# Patient Record
Sex: FEMALE | Race: OTHER | NOT HISPANIC OR LATINO
[De-identification: names, ages, dates, MRNs, and addresses within clinical notes are randomized per-mention and may not be internally consistent; named-entity substitution may affect disease eponyms.]

---

## 2017-01-18 ENCOUNTER — APPOINTMENT (OUTPATIENT)
Dept: GYNECOLOGIC ONCOLOGY | Facility: CLINIC | Age: 72
End: 2017-01-18

## 2017-01-18 VITALS
HEART RATE: 60 BPM | DIASTOLIC BLOOD PRESSURE: 70 MMHG | WEIGHT: 200 LBS | SYSTOLIC BLOOD PRESSURE: 170 MMHG | BODY MASS INDEX: 36.58 KG/M2

## 2017-01-19 LAB
ALBUMIN SERPL ELPH-MCNC: 4.4 G/DL
ALP BLD-CCNC: 112 U/L
ALT SERPL-CCNC: 20 U/L
ANION GAP SERPL CALC-SCNC: 16 MMOL/L
AST SERPL-CCNC: 16 U/L
BILIRUB SERPL-MCNC: 0.4 MG/DL
BUN SERPL-MCNC: 20 MG/DL
CALCIUM SERPL-MCNC: 10 MG/DL
CANCER AG125 SERPL-ACNC: 28 U/ML
CEA SERPL-MCNC: 6 NG/ML
CHLORIDE SERPL-SCNC: 106 MMOL/L
CO2 SERPL-SCNC: 21 MMOL/L
CREAT SERPL-MCNC: 0.85 MG/DL
GLUCOSE SERPL-MCNC: 150 MG/DL
POTASSIUM SERPL-SCNC: 4.6 MMOL/L
PROT SERPL-MCNC: 6.8 G/DL
SODIUM SERPL-SCNC: 143 MMOL/L

## 2017-02-03 ENCOUNTER — OUTPATIENT (OUTPATIENT)
Dept: OUTPATIENT SERVICES | Facility: HOSPITAL | Age: 72
LOS: 1 days | End: 2017-02-03
Payer: MEDICARE

## 2017-02-03 PROCEDURE — 78815 PET IMAGE W/CT SKULL-THIGH: CPT

## 2017-02-03 PROCEDURE — A9552: CPT

## 2017-02-03 PROCEDURE — 78815 PET IMAGE W/CT SKULL-THIGH: CPT | Mod: 26,PI

## 2017-03-22 ENCOUNTER — APPOINTMENT (OUTPATIENT)
Dept: OPHTHALMOLOGY | Facility: CLINIC | Age: 72
End: 2017-03-22

## 2017-04-04 ENCOUNTER — APPOINTMENT (OUTPATIENT)
Dept: OPHTHALMOLOGY | Facility: CLINIC | Age: 72
End: 2017-04-04

## 2017-04-19 ENCOUNTER — APPOINTMENT (OUTPATIENT)
Dept: GYNECOLOGIC ONCOLOGY | Facility: CLINIC | Age: 72
End: 2017-04-19

## 2017-04-19 VITALS
BODY MASS INDEX: 36.44 KG/M2 | DIASTOLIC BLOOD PRESSURE: 84 MMHG | SYSTOLIC BLOOD PRESSURE: 164 MMHG | WEIGHT: 198 LBS | HEART RATE: 61 BPM | HEIGHT: 62 IN

## 2017-04-20 LAB
CANCER AG125 SERPL-ACNC: 30 U/ML
CEA SERPL-MCNC: 5.8 NG/ML
CREAT SERPL-MCNC: 0.91 MG/DL

## 2017-08-14 ENCOUNTER — APPOINTMENT (OUTPATIENT)
Dept: GYNECOLOGIC ONCOLOGY | Facility: CLINIC | Age: 72
End: 2017-08-14
Payer: MEDICARE

## 2017-08-14 VITALS
HEIGHT: 62 IN | BODY MASS INDEX: 36.44 KG/M2 | DIASTOLIC BLOOD PRESSURE: 82 MMHG | HEART RATE: 62 BPM | SYSTOLIC BLOOD PRESSURE: 146 MMHG | WEIGHT: 198 LBS

## 2017-08-14 PROCEDURE — 36415 COLL VENOUS BLD VENIPUNCTURE: CPT

## 2017-08-14 PROCEDURE — 99214 OFFICE O/P EST MOD 30 MIN: CPT

## 2017-08-15 ENCOUNTER — INPATIENT (INPATIENT)
Facility: HOSPITAL | Age: 72
LOS: 1 days | Discharge: HOME | End: 2017-08-17
Attending: HOSPITALIST | Admitting: HOSPITALIST
Payer: MEDICARE

## 2017-08-15 VITALS
DIASTOLIC BLOOD PRESSURE: 76 MMHG | RESPIRATION RATE: 18 BRPM | HEART RATE: 78 BPM | OXYGEN SATURATION: 98 % | TEMPERATURE: 98 F | SYSTOLIC BLOOD PRESSURE: 123 MMHG

## 2017-08-15 DIAGNOSIS — L03.90 CELLULITIS, UNSPECIFIED: ICD-10-CM

## 2017-08-15 DIAGNOSIS — E03.9 HYPOTHYROIDISM, UNSPECIFIED: ICD-10-CM

## 2017-08-15 DIAGNOSIS — Z29.9 ENCOUNTER FOR PROPHYLACTIC MEASURES, UNSPECIFIED: ICD-10-CM

## 2017-08-15 DIAGNOSIS — E11.9 TYPE 2 DIABETES MELLITUS WITHOUT COMPLICATIONS: ICD-10-CM

## 2017-08-15 DIAGNOSIS — I10 ESSENTIAL (PRIMARY) HYPERTENSION: ICD-10-CM

## 2017-08-15 LAB
ALBUMIN SERPL ELPH-MCNC: 4.3 G/DL — SIGNIFICANT CHANGE UP (ref 3.3–5)
ALP SERPL-CCNC: 94 U/L — SIGNIFICANT CHANGE UP (ref 40–120)
ALT FLD-CCNC: 19 U/L — SIGNIFICANT CHANGE UP (ref 4–33)
AST SERPL-CCNC: 19 U/L — SIGNIFICANT CHANGE UP (ref 4–32)
BASE EXCESS BLDV CALC-SCNC: 0 MMOL/L — SIGNIFICANT CHANGE UP
BASOPHILS # BLD AUTO: 0.04 K/UL — SIGNIFICANT CHANGE UP (ref 0–0.2)
BASOPHILS NFR BLD AUTO: 0.5 % — SIGNIFICANT CHANGE UP (ref 0–2)
BASOPHILS NFR SPEC: 0.9 % — SIGNIFICANT CHANGE UP (ref 0–2)
BILIRUB SERPL-MCNC: 0.6 MG/DL — SIGNIFICANT CHANGE UP (ref 0.2–1.2)
BLOOD GAS VENOUS - CREATININE: 0.99 MG/DL — SIGNIFICANT CHANGE UP (ref 0.5–1.3)
BUN SERPL-MCNC: 28 MG/DL — HIGH (ref 7–23)
CALCIUM SERPL-MCNC: 9.7 MG/DL — SIGNIFICANT CHANGE UP (ref 8.4–10.5)
CHLORIDE BLDV-SCNC: 106 MMOL/L — SIGNIFICANT CHANGE UP (ref 96–108)
CHLORIDE SERPL-SCNC: 102 MMOL/L — SIGNIFICANT CHANGE UP (ref 98–107)
CO2 SERPL-SCNC: 25 MMOL/L — SIGNIFICANT CHANGE UP (ref 22–31)
CREAT SERPL-MCNC: 0.97 MG/DL — SIGNIFICANT CHANGE UP (ref 0.5–1.3)
EOSINOPHIL # BLD AUTO: 0.15 K/UL — SIGNIFICANT CHANGE UP (ref 0–0.5)
EOSINOPHIL NFR BLD AUTO: 2.1 % — SIGNIFICANT CHANGE UP (ref 0–6)
EOSINOPHIL NFR FLD: 5.2 % — SIGNIFICANT CHANGE UP (ref 0–6)
ERYTHROCYTE [SEDIMENTATION RATE] IN BLOOD: 44 MM/HR — HIGH (ref 4–25)
GAS PNL BLDV: 139 MMOL/L — SIGNIFICANT CHANGE UP (ref 136–146)
GIANT PLATELETS BLD QL SMEAR: PRESENT — SIGNIFICANT CHANGE UP
GLUCOSE BLDV-MCNC: 129 — HIGH (ref 70–99)
GLUCOSE SERPL-MCNC: 138 MG/DL — HIGH (ref 70–99)
HCO3 BLDV-SCNC: 23 MMOL/L — SIGNIFICANT CHANGE UP (ref 20–27)
HCT VFR BLD CALC: 34 % — LOW (ref 34.5–45)
HCT VFR BLDV CALC: 37.5 % — SIGNIFICANT CHANGE UP (ref 34.5–45)
HGB BLD-MCNC: 11.5 G/DL — SIGNIFICANT CHANGE UP (ref 11.5–15.5)
HGB BLDV-MCNC: 12.2 G/DL — SIGNIFICANT CHANGE UP (ref 11.5–15.5)
HYPOCHROMIA BLD QL: SLIGHT — SIGNIFICANT CHANGE UP
IMM GRANULOCYTES # BLD AUTO: 0.02 # — SIGNIFICANT CHANGE UP
IMM GRANULOCYTES NFR BLD AUTO: 0.3 % — SIGNIFICANT CHANGE UP (ref 0–1.5)
LACTATE BLDV-MCNC: 1.6 MMOL/L — SIGNIFICANT CHANGE UP (ref 0.5–2)
LYMPHOCYTES # BLD AUTO: 1.28 K/UL — SIGNIFICANT CHANGE UP (ref 1–3.3)
LYMPHOCYTES # BLD AUTO: 17.6 % — SIGNIFICANT CHANGE UP (ref 13–44)
LYMPHOCYTES NFR SPEC AUTO: 14.8 % — SIGNIFICANT CHANGE UP (ref 13–44)
MCHC RBC-ENTMCNC: 31.9 PG — SIGNIFICANT CHANGE UP (ref 27–34)
MCHC RBC-ENTMCNC: 33.8 % — SIGNIFICANT CHANGE UP (ref 32–36)
MCV RBC AUTO: 94.4 FL — SIGNIFICANT CHANGE UP (ref 80–100)
MONOCYTES # BLD AUTO: 0.57 K/UL — SIGNIFICANT CHANGE UP (ref 0–0.9)
MONOCYTES NFR BLD AUTO: 7.8 % — SIGNIFICANT CHANGE UP (ref 2–14)
MONOCYTES NFR BLD: 7.8 % — SIGNIFICANT CHANGE UP (ref 2–9)
NEUTROPHIL AB SER-ACNC: 71.3 % — SIGNIFICANT CHANGE UP (ref 43–77)
NEUTROPHILS # BLD AUTO: 5.23 K/UL — SIGNIFICANT CHANGE UP (ref 1.8–7.4)
NEUTROPHILS NFR BLD AUTO: 71.7 % — SIGNIFICANT CHANGE UP (ref 43–77)
NRBC # FLD: 0 — SIGNIFICANT CHANGE UP
PCO2 BLDV: 52 MMHG — HIGH (ref 41–51)
PH BLDV: 7.31 PH — LOW (ref 7.32–7.43)
PLATELET # BLD AUTO: 197 K/UL — SIGNIFICANT CHANGE UP (ref 150–400)
PLATELET COUNT - ESTIMATE: NORMAL — SIGNIFICANT CHANGE UP
PMV BLD: 10.7 FL — SIGNIFICANT CHANGE UP (ref 7–13)
PO2 BLDV: 26 MMHG — LOW (ref 35–40)
POTASSIUM BLDV-SCNC: 3.6 MMOL/L — SIGNIFICANT CHANGE UP (ref 3.4–4.5)
POTASSIUM SERPL-MCNC: 3.8 MMOL/L — SIGNIFICANT CHANGE UP (ref 3.5–5.3)
POTASSIUM SERPL-SCNC: 3.8 MMOL/L — SIGNIFICANT CHANGE UP (ref 3.5–5.3)
PROT SERPL-MCNC: 7 G/DL — SIGNIFICANT CHANGE UP (ref 6–8.3)
RBC # BLD: 3.6 M/UL — LOW (ref 3.8–5.2)
RBC # FLD: 12.5 % — SIGNIFICANT CHANGE UP (ref 10.3–14.5)
SAO2 % BLDV: 38.7 % — LOW (ref 60–85)
SODIUM SERPL-SCNC: 141 MMOL/L — SIGNIFICANT CHANGE UP (ref 135–145)
WBC # BLD: 7.29 K/UL — SIGNIFICANT CHANGE UP (ref 3.8–10.5)
WBC # FLD AUTO: 7.29 K/UL — SIGNIFICANT CHANGE UP (ref 3.8–10.5)

## 2017-08-15 PROCEDURE — 73620 X-RAY EXAM OF FOOT: CPT | Mod: 26,RT

## 2017-08-15 PROCEDURE — 99223 1ST HOSP IP/OBS HIGH 75: CPT | Mod: GC

## 2017-08-15 RX ORDER — SODIUM CHLORIDE 9 MG/ML
1000 INJECTION, SOLUTION INTRAVENOUS
Qty: 0 | Refills: 0 | Status: DISCONTINUED | OUTPATIENT
Start: 2017-08-15 | End: 2017-08-16

## 2017-08-15 RX ORDER — ACETAMINOPHEN 500 MG
650 TABLET ORAL EVERY 6 HOURS
Qty: 0 | Refills: 0 | Status: DISCONTINUED | OUTPATIENT
Start: 2017-08-15 | End: 2017-08-17

## 2017-08-15 RX ORDER — DEXTROSE 50 % IN WATER 50 %
1 SYRINGE (ML) INTRAVENOUS ONCE
Qty: 0 | Refills: 0 | Status: DISCONTINUED | OUTPATIENT
Start: 2017-08-15 | End: 2017-08-17

## 2017-08-15 RX ORDER — VANCOMYCIN HCL 1 G
1000 VIAL (EA) INTRAVENOUS ONCE
Qty: 0 | Refills: 0 | Status: COMPLETED | OUTPATIENT
Start: 2017-08-15 | End: 2017-08-15

## 2017-08-15 RX ORDER — LEVOTHYROXINE SODIUM 125 MCG
50 TABLET ORAL DAILY
Qty: 0 | Refills: 0 | Status: DISCONTINUED | OUTPATIENT
Start: 2017-08-15 | End: 2017-08-17

## 2017-08-15 RX ORDER — ENOXAPARIN SODIUM 100 MG/ML
40 INJECTION SUBCUTANEOUS EVERY 24 HOURS
Qty: 0 | Refills: 0 | Status: DISCONTINUED | OUTPATIENT
Start: 2017-08-15 | End: 2017-08-17

## 2017-08-15 RX ORDER — VANCOMYCIN HCL 1 G
1000 VIAL (EA) INTRAVENOUS EVERY 12 HOURS
Qty: 0 | Refills: 0 | Status: DISCONTINUED | OUTPATIENT
Start: 2017-08-15 | End: 2017-08-17

## 2017-08-15 RX ORDER — GLUCAGON INJECTION, SOLUTION 0.5 MG/.1ML
1 INJECTION, SOLUTION SUBCUTANEOUS ONCE
Qty: 0 | Refills: 0 | Status: DISCONTINUED | OUTPATIENT
Start: 2017-08-15 | End: 2017-08-17

## 2017-08-15 RX ORDER — PIPERACILLIN AND TAZOBACTAM 4; .5 G/20ML; G/20ML
3.38 INJECTION, POWDER, LYOPHILIZED, FOR SOLUTION INTRAVENOUS ONCE
Qty: 0 | Refills: 0 | Status: COMPLETED | OUTPATIENT
Start: 2017-08-15 | End: 2017-08-15

## 2017-08-15 RX ORDER — INSULIN LISPRO 100/ML
VIAL (ML) SUBCUTANEOUS AT BEDTIME
Qty: 0 | Refills: 0 | Status: DISCONTINUED | OUTPATIENT
Start: 2017-08-15 | End: 2017-08-17

## 2017-08-15 RX ORDER — INSULIN LISPRO 100/ML
VIAL (ML) SUBCUTANEOUS
Qty: 0 | Refills: 0 | Status: DISCONTINUED | OUTPATIENT
Start: 2017-08-15 | End: 2017-08-17

## 2017-08-15 RX ORDER — DEXTROSE 50 % IN WATER 50 %
12.5 SYRINGE (ML) INTRAVENOUS ONCE
Qty: 0 | Refills: 0 | Status: DISCONTINUED | OUTPATIENT
Start: 2017-08-15 | End: 2017-08-17

## 2017-08-15 RX ORDER — SODIUM CHLORIDE 9 MG/ML
1000 INJECTION, SOLUTION INTRAVENOUS
Qty: 0 | Refills: 0 | Status: DISCONTINUED | OUTPATIENT
Start: 2017-08-15 | End: 2017-08-15

## 2017-08-15 RX ORDER — ONDANSETRON 8 MG/1
4 TABLET, FILM COATED ORAL EVERY 6 HOURS
Qty: 0 | Refills: 0 | Status: DISCONTINUED | OUTPATIENT
Start: 2017-08-15 | End: 2017-08-17

## 2017-08-15 RX ORDER — HYDROCHLOROTHIAZIDE 25 MG
12.5 TABLET ORAL DAILY
Qty: 0 | Refills: 0 | Status: DISCONTINUED | OUTPATIENT
Start: 2017-08-15 | End: 2017-08-17

## 2017-08-15 RX ORDER — LOSARTAN POTASSIUM 100 MG/1
100 TABLET, FILM COATED ORAL DAILY
Qty: 0 | Refills: 0 | Status: DISCONTINUED | OUTPATIENT
Start: 2017-08-15 | End: 2017-08-17

## 2017-08-15 RX ORDER — PIPERACILLIN AND TAZOBACTAM 4; .5 G/20ML; G/20ML
3.38 INJECTION, POWDER, LYOPHILIZED, FOR SOLUTION INTRAVENOUS EVERY 8 HOURS
Qty: 0 | Refills: 0 | Status: DISCONTINUED | OUTPATIENT
Start: 2017-08-15 | End: 2017-08-17

## 2017-08-15 RX ADMIN — Medication 12.5 MILLIGRAM(S): at 18:10

## 2017-08-15 RX ADMIN — Medication 250 MILLIGRAM(S): at 11:58

## 2017-08-15 RX ADMIN — SODIUM CHLORIDE 100 MILLILITER(S): 9 INJECTION, SOLUTION INTRAVENOUS at 18:10

## 2017-08-15 RX ADMIN — PIPERACILLIN AND TAZOBACTAM 25 GRAM(S): 4; .5 INJECTION, POWDER, LYOPHILIZED, FOR SOLUTION INTRAVENOUS at 22:35

## 2017-08-15 RX ADMIN — PIPERACILLIN AND TAZOBACTAM 200 GRAM(S): 4; .5 INJECTION, POWDER, LYOPHILIZED, FOR SOLUTION INTRAVENOUS at 14:43

## 2017-08-15 NOTE — ED PROVIDER NOTE - CARDIAC, MLM
Normal rate, regular rhythm.  Heart sounds S1, S2.  No murmurs, rubs or gallops. +1 pitting edema on left extremity. +2 pitting edema on R extremity. +2 DP pulses b/l.

## 2017-08-15 NOTE — ED PROVIDER NOTE - MEDICAL DECISION MAKING DETAILS
72 y/o F with hx of T2DM, HTN, presents with R foot wound, with sorrounding erythema. Pt has diabetic wound with likely superimposed cellulitis. Currently not septic but given pt is diabetic, with spread of infection, will start vanco for MRSA coverage, and zosyn for strep and GNR coverage. Pt need admission to medicine and podiatry consult. Will obtain CBC, blood cx.

## 2017-08-15 NOTE — H&P ADULT - NSHPPHYSICALEXAM_GEN_ALL_CORE
GENERAL APPEARANCE: Well developed, well nourished, alert and cooperative. NAD.   HEENT:  PERRL, EOMI  NECK: Neck supple, non-tender  CARDIAC: Normal S1 and S2. Rhythm is regular.  LUNGS: Clear to auscultation. No rales, rhonchi or wheezing appreciated.  ABDOMEN: Positive bowel sounds. Soft, nondistended, nontender. No guarding or rebound. +Incisional abdominal hernia x2, both reducible and nontender.   EXTREMITIES: RLE: 2+ edema, erythema, tenderness to palpation. Palpable PT and DP. LLE: 1+ edema, non-tender, non-erythematous. Palpable PT and DP.   NEUROLOGICAL: CN II-XII intact. Strength and sensation symmetric and grossly intact throughout. GENERAL APPEARANCE: Well developed, well nourished, alert and cooperative. NAD.   HEENT:  PERRL, EOMI  NECK: Neck supple, non-tender  CARDIAC: Normal S1 and S2. Rhythm is regular and rate regular  LUNGS: Clear to auscultation. No rales, rhonchi or wheezing appreciated.  ABDOMEN: Positive bowel sounds. Soft, nondistended, nontender. No guarding or rebound. +Incisional abdominal hernia x2, both reducible and nontender.   EXTREMITIES: RLE: 2+ edema, erythema, tenderness to palpation. Palpable PT and DP. LLE: 1+ edema, non-tender, non-erythematous. Palpable PT and DP.   NEUROLOGICAL: CN II-XII intact. Strength and sensation symmetric and grossly intact throughout.  PSYCH: Calm, cooperative, normal affect  SKIN: No other lesions, rash noted except as above on RLE  HEME: No LAD appeciated

## 2017-08-15 NOTE — CONSULT NOTE ADULT - SUBJECTIVE AND OBJECTIVE BOX
Patient is a 72y old  Female who presents with a chief complaint of Right foot cellulitis (15 Aug 2017 17:57)    HPI:  This is a 72 year old female with a PMHx significant for HTN, T2DM and hypothyroidism who presents with a 3 day history of worsening R foot swelling and pain. Patient states that she noticed the swelling 3 days ago, but the pain didn't begin until yesterday. The pain prompted her to take a look at the bottom of her foot with a mirror and notice a ~1cm defect in the plantar foot pad. Patient does not recall stepping on anything sharp and denies any trauma to the area. Patient denies any fevers, chills or nightsweats. Admits to decreased appetite and decreased oral intake. Patient also complains of some nausea, denies abdominal pain or vomiting. Denies CP or SOB. Patient states that she normally ambulates independently.    In ED, VS: 98F, 123/52, 63, 100%RA(18). Vanc/Zosyn x1 dose. (15 Aug 2017 17:57)    PAST MEDICAL & SURGICAL HISTORY:  Type 2 diabetes mellitus: type 2  Hypothyroidism: Adult hypothyroidism  Essential hypertension: HTN (hypertension)  Arthropathy: Arthritis  Abdominal swelling: Pelvic mass  Hyperlipemia  Obesity  Hypothyroidism  DM (Diabetes Mellitus)  HTN  Elective surgery for purposes other than treating health conditions: Gall Bladder removal  Other postprocedural status: S/P appendectomy  Elective surgery for purposes other than treating health conditions: Uterine Biospy  S/P Appendectomy    MEDICATIONS  (STANDING):  losartan 100 milliGRAM(s) Oral daily  levothyroxine 50 MICROGram(s) Oral daily  hydrochlorothiazide 12.5 milliGRAM(s) Oral daily  enoxaparin Injectable 40 milliGRAM(s) SubCutaneous every 24 hours  lactated ringers. 1000 milliLiter(s) (100 mL/Hr) IV Continuous <Continuous>  insulin lispro (HumaLOG) corrective regimen sliding scale   SubCutaneous three times a day before meals  insulin lispro (HumaLOG) corrective regimen sliding scale   SubCutaneous at bedtime  dextrose 50% Injectable 12.5 Gram(s) IV Push once  vancomycin  IVPB 1000 milliGRAM(s) IV Intermittent every 12 hours  piperacillin/tazobactam IVPB. 3.375 Gram(s) IV Intermittent every 8 hours    MEDICATIONS  (PRN):  acetaminophen   Tablet. 650 milliGRAM(s) Oral every 6 hours PRN Moderate Pain (4 - 6)  ondansetron Injectable 4 milliGRAM(s) IV Push every 6 hours PRN Nausea  dextrose Gel 1 Dose(s) Oral once PRN Blood Glucose LESS THAN 70 milliGRAM(s)/deciliter  glucagon  Injectable 1 milliGRAM(s) IntraMuscular once PRN Glucose LESS THAN 70 milligrams/deciliter    Allergies    No Known Allergies    Intolerances    VITALS:    Vital Signs Last 24 Hrs  T(C): 37.3 (15 Aug 2017 21:41), Max: 37.3 (15 Aug 2017 21:41)  T(F): 99.1 (15 Aug 2017 21:41), Max: 99.1 (15 Aug 2017 21:41)  HR: 62 (15 Aug 2017 21:41) (62 - 78)  BP: 130/67 (15 Aug 2017 21:41) (123/52 - 143/68)  BP(mean): --  RR: 18 (15 Aug 2017 21:41) (18 - 18)  SpO2: 100% (15 Aug 2017 21:41) (98% - 100%)    LABS:                    11.5   7.29  )-----------( 197      ( 15 Aug 2017 11:20 )             34.0     08-15    141  |  102  |  28<H>  ----------------------------<  138<H>  3.8   |  25  |  0.97    Ca    9.7      15 Aug 2017 11:20    TPro  7.0  /  Alb  4.3  /  TBili  0.6  /  DBili  x   /  AST  19  /  ALT  19  /  AlkPhos  94  08-15    CAPILLARY BLOOD GLUCOSE  134 (15 Aug 2017 21:41)  98 (15 Aug 2017 17:57)    LOWER EXTREMITY PHYSICAL EXAM:    Vasular: DP/PT 2/4, B/L, CFT <3 seconds to all digits B/L, Temperature gradient WNL, B/L.   Neuro: Epicritic sensation diminished to the level of the ankle, B/L.  Musculoskeletal/Ortho: No gross pedal deformities noted, B/L.  Skin:  Wound #1:  Location:  Sub 2nd met head R foot  Size: Aprox 1.5 cm x 0.5 cm  Depth: To subQ, neg probe to bone  Wound bed: fibrogranular  Drainage: mild serous  Odor: none  Periwound: Erythema and edema to the ankle  Etiology: Likely puncture wound w/ diabetic neuropathy    RADIOLOGY & ADDITIONAL STUDIES:  < from: Xray Foot AP + Lateral, Right (08.15.17 @ 13:21) >  EXAM:  RAD FOOT 2 VIEWS RIGHT      PROCEDURE DATE:  Aug 15 2017     INTERPRETATION:  CLINICAL INDICATION: right foot pain/infection; evaluate   for osteomyelitis    EXAM:  Frontal, oblique, and lateral right foot from 8/15/2017 at 1321. Compared   to prior study from 5/20/2009.    IMPRESSION:  Generalized soft tissue swelling. No tracking soft tissue gas   collections, radiopaque foreign bodies, or gross radiographic evidence   for osteomyelitis.    Chronic tiny ossific dictation adjacentto 5th metatarsal base   tuberosity. No dislocations or acute appearing fractures.     Tarsometatarsal alignment maintained without evidence for a Lisfranc   injury.    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 Preserved remaining   visualized joint spaces.    Plantar and posterior calcaneal dorsomedial talar enthesophytes.    No lytic or blastic lesions.    PITO FISCHER M.D., ATTENDING RADIOLOGIST  This document has been electronically signed. Aug 15 2017  3:12PM    < end of copied text >

## 2017-08-15 NOTE — ED PROVIDER NOTE - PSH
Elective surgery for purposes other than treating health conditions  Uterine Biospy  Elective surgery for purposes other than treating health conditions  Gall Bladder removal  Other postprocedural status  S/P appendectomy  S/P Appendectomy

## 2017-08-15 NOTE — CONSULT NOTE ADULT - ASSESSMENT
73 y/o F w/ R foot sub 2nd met head ulcer to subQ w/ surrounding cellulitis  - Pt seen and examined  - Wound flushed, Cx taken  - Due to neg probe to bone, low concern for OM  - No pod surgical plan at this time. If appearance worsens, consider MRI  - Cont w/ IV Vanco/Zosyn until cx return  - WBAT in surgical shoe to R foot with weight to heel  - Wound packed and covered w/ DSD  - Will d/w attending 73 y/o F w/ R foot sub 2nd met head ulcer to subQ w/ surrounding cellulitis  - Pt seen and examined  - Wound flushed, Cx taken  - Due to neg probe to bone and no bone changes on XR, low concern for OM  - No pod surgical plan at this time. If appearance worsens, consider MRI  - Cont w/ IV Vanco/Zosyn until cx return  - WBAT in surgical shoe to R foot with weight to heel  - Wound packed and covered w/ DSD  - Will d/w attending

## 2017-08-15 NOTE — H&P ADULT - PROBLEM SELECTOR PLAN 2
Holding home medications (glimepride 2mg po daily, jentadueto 25/1000mg po BID)  - Check fingerstick glucose and provide ISS coverage for now, will monitor and adjust as needed.  - Check HgbA1c with AM labs  - Consistent Carbohydrate Diet as tolerated

## 2017-08-15 NOTE — H&P ADULT - ASSESSMENT
72F with PMHx HTN, T2DM and Hypothyroidism admitted with RLE cellulitis 72F with PMHx HTN, T2DM and Hypothyroidism admitted with RLE cellulitis 2/2 diabetic wound.

## 2017-08-15 NOTE — H&P ADULT - NSHPREVIEWOFSYSTEMS_GEN_ALL_CORE
Review of Systems:   CONSTITUTIONAL: No fever, weight loss, or fatigue  RESPIRATORY: No cough, wheezing, chills or hemoptysis; No shortness of breath  CARDIOVASCULAR: No chest pain, palpitations, dizziness, +leg swelling  GASTROINTESTINAL: No abdominal or epigastric pain. No nausea, vomiting, diarrhea, constipation  GENITOURINARY: No dysuria, frequency,   SKIN: No itching, burning, rashes, or lesions, +wound

## 2017-08-15 NOTE — ED ADULT NURSE NOTE - OBJECTIVE STATEMENT
Pt to bed 29. Alert and oriented x 3. Pt c/o right foot ulcer, pain abd swelling. IVL placed. Bloods drawn. Safety maintained. Will continue to monitor.

## 2017-08-15 NOTE — ED PROVIDER NOTE - ATTENDING CONTRIBUTION TO CARE
72F h/o DM, HTN presents for right foot ulcer, rubor, calor, dolor. Open wound. Concern for significant foot infection in diabetic. No signs of sepsis or osteomyelitis. IV antibiotics, IVF, pain control, admit.

## 2017-08-15 NOTE — H&P ADULT - NSHPLABSRESULTS_GEN_ALL_CORE
11.5   7.29  )-----------( 197      ( 15 Aug 2017 11:20 )             34.0     Auto Eosinophil # 0.15  / Auto Eosinophil % 2.1   / Auto Neutrophil # 5.23  / Auto Neutrophil % 71.7  / BANDS % x        08-15    141  |  102  |  28<H>  ----------------------------<  138<H>  3.8   |  25  |  0.97    Ca    9.7      15 Aug 2017 11:20  TPro  7.0  /  Alb  4.3  /  TBili  0.6  /  DBili  x   /  AST  19  /  ALT  19  /  AlkPhos  94  08-15      < from: Xray Foot AP + Lateral, Right (08.15.17 @ 13:21) >  IMPRESSION:  Generalized soft tissue swelling. No tracking soft tissue gas   collections, radiopaque foreign bodies, or gross radiographic evidence   for osteomyelitis.  Chronic tiny ossific dictation adjacentto 5th metatarsal base   tuberosity. No dislocations or acute appearing fractures.   Tarsometatarsal alignment maintained without evidence for a Lisfranc   injury.  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 Preserved remaining   visualized joint spaces.  Plantar and posterior calcaneal dorsomedial talar enthesophytes.  No lytic or blastic lesions.  < end of copied text > LABS AND IMAGING PERSONALLY REVIEWED:                 11.5   7.29  )-----------( 197      ( 15 Aug 2017 11:20 )             34.0     Auto Eosinophil # 0.15  / Auto Eosinophil % 2.1   / Auto Neutrophil # 5.23  / Auto Neutrophil % 71.7  / BANDS % x        08-15    141  |  102  |  28<H>  ----------------------------<  138<H>  3.8   |  25  |  0.97    Ca    9.7      15 Aug 2017 11:20  TPro  7.0  /  Alb  4.3  /  TBili  0.6  /  DBili  x   /  AST  19  /  ALT  19  /  AlkPhos  94  08-15      < from: Xray Foot AP + Lateral, Right (08.15.17 @ 13:21) >  IMPRESSION:  Generalized soft tissue swelling. No tracking soft tissue gas   collections, radiopaque foreign bodies, or gross radiographic evidence   for osteomyelitis.  Chronic tiny ossific dictation adjacentto 5th metatarsal base   tuberosity. No dislocations or acute appearing fractures.   Tarsometatarsal alignment maintained without evidence for a Lisfranc   injury.  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 Preserved remaining   visualized joint spaces.  Plantar and posterior calcaneal dorsomedial talar enthesophytes.  No lytic or blastic lesions.

## 2017-08-15 NOTE — H&P ADULT - PROBLEM SELECTOR PLAN 1
Likely secondary to penetrating trauma by some sort of jagged object (rock, nail, glass) given notable skin defect on plantar foot pad of R foot. Likely delayed presentation secondary to diabetic neuropathy, as patient initially noticed the swelling 3 days ago but didn't begin to feel any discomfort until the day prior to presentation. Does not meet SIRS criteria on admission. Received Vanc/Zosyn in ED.  - Continue Vanc/Zosyn for now for MRSA, Pseudomonal and GNR coverage.   - Await Blood cultures and narrow antibiotic coverage as able.  - Area marked, continue to monitor for signs of response to antibiotic therapy.  - Consult podiatry for further evaluation and recommendations.   - Pain control as needed with Tylenol

## 2017-08-15 NOTE — ED PROVIDER NOTE - SKIN WOUND TYPE
PUNCTURE WOUND(S)/deep wound, no puss expressed. small amount of serosanguinous fluid discharged., pt had surrounding erythema extending to dorsal aspect of foot. swelling and warmth.

## 2017-08-15 NOTE — H&P ADULT - ATTENDING COMMENTS
I personally saw and evaluated the patient at bedside. This is a 72 year old F with DM2 (likely c/b neuropathy), HTN, hypothyroidism admitted for right foot cellulitis likely from stepping on an object. Podiatry consulted for further evaluation of wound, will c/w vancomycin and Zosyn for now for broad spectrum coverage in light of patient's DM2. Check A1c and FLP in AM. ESR not markedly elevated to suggest osteomyelitis but will check CRP. Remaining care as above. F/u blood culture, send 2nd culture, f/u wound culture.    TIME SPENT: 75mins

## 2017-08-15 NOTE — ED PROVIDER NOTE - PMH
Abdominal swelling  Pelvic mass  Arthropathy  Arthritis  DM (Diabetes Mellitus)    Essential hypertension  HTN (hypertension)  HTN    Hyperlipemia    Hypothyroidism  Adult hypothyroidism  Hypothyroidism    Obesity    Type 2 diabetes mellitus  type 2

## 2017-08-15 NOTE — H&P ADULT - NSHPSOCIALHISTORY_GEN_ALL_CORE
Lives at home with  and family. Performs all ADLs independently.   Former Smoker, quit 30-40 years ago.  Social alcohol use.  Denies drug use.

## 2017-08-15 NOTE — H&P ADULT - HISTORY OF PRESENT ILLNESS
This is a 72 year old female with a PMHx significant for HTN, T2DM and hypothyroidism who presents with a 3 day history of worsening R foot swelling and pain. Patient states that she noticed the swelling 3 days ago, but the pain didn't begin until yesterday. The pain prompted her to take a look at the bottom of her foot with a mirror and notice a ~1cm defect in the plantar foot pad. Patient does not recall stepping on anything sharp and denies any trauma to the area. Patient denies any fevers, chills or nightsweats. Admits to decreased appetite and decreased oral intake. Patient also complains of some nausea, denies abdominal pain or vomiting. Denies CP or SOB. Patient states that she normally ambulates independently.    In ED, VS: 98F, 123/52, 63, 100%RA(18). Vanc/Zosyn x1 dose.

## 2017-08-16 ENCOUNTER — TRANSCRIPTION ENCOUNTER (OUTPATIENT)
Age: 72
End: 2017-08-16

## 2017-08-16 LAB
ALBUMIN SERPL ELPH-MCNC: 4.5 G/DL
ALP BLD-CCNC: 100 U/L
ALT SERPL-CCNC: 19 U/L
ANION GAP SERPL CALC-SCNC: 18 MMOL/L
AST SERPL-CCNC: 20 U/L
BASOPHILS # BLD AUTO: 0.02 K/UL
BASOPHILS # BLD AUTO: 0.03 K/UL — SIGNIFICANT CHANGE UP (ref 0–0.2)
BASOPHILS NFR BLD AUTO: 0.2 %
BASOPHILS NFR BLD AUTO: 0.4 % — SIGNIFICANT CHANGE UP (ref 0–2)
BILIRUB SERPL-MCNC: 0.8 MG/DL
BUN SERPL-MCNC: 24 MG/DL — HIGH (ref 7–23)
BUN SERPL-MCNC: 27 MG/DL
CALCIUM SERPL-MCNC: 9.5 MG/DL — SIGNIFICANT CHANGE UP (ref 8.4–10.5)
CALCIUM SERPL-MCNC: 9.6 MG/DL
CANCER AG125 SERPL-ACNC: 23 U/ML
CEA SERPL-MCNC: 4.9 NG/ML
CHLORIDE SERPL-SCNC: 105 MMOL/L
CHLORIDE SERPL-SCNC: 106 MMOL/L — SIGNIFICANT CHANGE UP (ref 98–107)
CHOLEST SERPL-MCNC: 145 MG/DL — SIGNIFICANT CHANGE UP (ref 120–199)
CO2 SERPL-SCNC: 21 MMOL/L — LOW (ref 22–31)
CO2 SERPL-SCNC: 22 MMOL/L
CREAT SERPL-MCNC: 0.95 MG/DL
CREAT SERPL-MCNC: 0.96 MG/DL — SIGNIFICANT CHANGE UP (ref 0.5–1.3)
CRP SERPL-MCNC: 46.4 MG/L — HIGH (ref 0.3–5)
EOSINOPHIL # BLD AUTO: 0.2 K/UL
EOSINOPHIL # BLD AUTO: 0.27 K/UL — SIGNIFICANT CHANGE UP (ref 0–0.5)
EOSINOPHIL NFR BLD AUTO: 2.4 %
EOSINOPHIL NFR BLD AUTO: 3.7 % — SIGNIFICANT CHANGE UP (ref 0–6)
GLUCOSE SERPL-MCNC: 106 MG/DL — HIGH (ref 70–99)
HBA1C BLD-MCNC: 6.2 % — HIGH (ref 4–5.6)
HCT VFR BLD CALC: 33 % — LOW (ref 34.5–45)
HCT VFR BLD CALC: 37.2 %
HDLC SERPL-MCNC: 45 MG/DL — SIGNIFICANT CHANGE UP (ref 45–65)
HGB BLD-MCNC: 11.2 G/DL — LOW (ref 11.5–15.5)
HGB BLD-MCNC: 12 G/DL
IMM GRANULOCYTES # BLD AUTO: 0.02 # — SIGNIFICANT CHANGE UP
IMM GRANULOCYTES NFR BLD AUTO: 0.3 % — SIGNIFICANT CHANGE UP (ref 0–1.5)
IMM GRANULOCYTES NFR BLD AUTO: 0.4 %
LIPID PNL WITH DIRECT LDL SERPL: 93 MG/DL — SIGNIFICANT CHANGE UP
LYMPHOCYTES # BLD AUTO: 1.24 K/UL
LYMPHOCYTES # BLD AUTO: 1.39 K/UL — SIGNIFICANT CHANGE UP (ref 1–3.3)
LYMPHOCYTES # BLD AUTO: 19.1 % — SIGNIFICANT CHANGE UP (ref 13–44)
LYMPHOCYTES NFR BLD AUTO: 15.2 %
MAGNESIUM SERPL-MCNC: 1.7 MG/DL — SIGNIFICANT CHANGE UP (ref 1.6–2.6)
MAN DIFF?: NORMAL
MCHC RBC-ENTMCNC: 31.7 PG
MCHC RBC-ENTMCNC: 31.7 PG — SIGNIFICANT CHANGE UP (ref 27–34)
MCHC RBC-ENTMCNC: 32.3 GM/DL
MCHC RBC-ENTMCNC: 33.9 % — SIGNIFICANT CHANGE UP (ref 32–36)
MCV RBC AUTO: 93.5 FL — SIGNIFICANT CHANGE UP (ref 80–100)
MCV RBC AUTO: 98.4 FL
MONOCYTES # BLD AUTO: 0.61 K/UL — SIGNIFICANT CHANGE UP (ref 0–0.9)
MONOCYTES # BLD AUTO: 0.74 K/UL
MONOCYTES NFR BLD AUTO: 8.4 % — SIGNIFICANT CHANGE UP (ref 2–14)
MONOCYTES NFR BLD AUTO: 9.1 %
NEUTROPHILS # BLD AUTO: 4.97 K/UL — SIGNIFICANT CHANGE UP (ref 1.8–7.4)
NEUTROPHILS # BLD AUTO: 5.94 K/UL
NEUTROPHILS NFR BLD AUTO: 68.1 % — SIGNIFICANT CHANGE UP (ref 43–77)
NEUTROPHILS NFR BLD AUTO: 72.7 %
NRBC # FLD: 0 — SIGNIFICANT CHANGE UP
PHOSPHATE SERPL-MCNC: 3.4 MG/DL — SIGNIFICANT CHANGE UP (ref 2.5–4.5)
PLATELET # BLD AUTO: 166 K/UL — SIGNIFICANT CHANGE UP (ref 150–400)
PLATELET # BLD AUTO: 230 K/UL
PMV BLD: 11.9 FL — SIGNIFICANT CHANGE UP (ref 7–13)
POTASSIUM SERPL-MCNC: 4.6 MMOL/L — SIGNIFICANT CHANGE UP (ref 3.5–5.3)
POTASSIUM SERPL-SCNC: 4.4 MMOL/L
POTASSIUM SERPL-SCNC: 4.6 MMOL/L — SIGNIFICANT CHANGE UP (ref 3.5–5.3)
PROT SERPL-MCNC: 7.5 G/DL
RBC # BLD: 3.53 M/UL — LOW (ref 3.8–5.2)
RBC # BLD: 3.78 M/UL
RBC # FLD: 12.8 % — SIGNIFICANT CHANGE UP (ref 10.3–14.5)
RBC # FLD: 14.2 %
SODIUM SERPL-SCNC: 143 MMOL/L — SIGNIFICANT CHANGE UP (ref 135–145)
SODIUM SERPL-SCNC: 145 MMOL/L
SPECIMEN SOURCE: SIGNIFICANT CHANGE UP
SPECIMEN SOURCE: SIGNIFICANT CHANGE UP
TRIGL SERPL-MCNC: 104 MG/DL — SIGNIFICANT CHANGE UP (ref 10–149)
WBC # BLD: 7.29 K/UL — SIGNIFICANT CHANGE UP (ref 3.8–10.5)
WBC # FLD AUTO: 7.29 K/UL — SIGNIFICANT CHANGE UP (ref 3.8–10.5)
WBC # FLD AUTO: 8.17 K/UL

## 2017-08-16 PROCEDURE — 99233 SBSQ HOSP IP/OBS HIGH 50: CPT | Mod: GC

## 2017-08-16 RX ORDER — TETANUS AND DIPHTHERIA TOXOIDS ADSORBED 2; 2 [LF]/.5ML; [LF]/.5ML
0.5 INJECTION INTRAMUSCULAR ONCE
Qty: 0 | Refills: 0 | Status: COMPLETED | OUTPATIENT
Start: 2017-08-16 | End: 2017-08-17

## 2017-08-16 RX ADMIN — Medication 50 MICROGRAM(S): at 06:23

## 2017-08-16 RX ADMIN — ENOXAPARIN SODIUM 40 MILLIGRAM(S): 100 INJECTION SUBCUTANEOUS at 06:23

## 2017-08-16 RX ADMIN — PIPERACILLIN AND TAZOBACTAM 25 GRAM(S): 4; .5 INJECTION, POWDER, LYOPHILIZED, FOR SOLUTION INTRAVENOUS at 06:23

## 2017-08-16 RX ADMIN — PIPERACILLIN AND TAZOBACTAM 25 GRAM(S): 4; .5 INJECTION, POWDER, LYOPHILIZED, FOR SOLUTION INTRAVENOUS at 21:28

## 2017-08-16 RX ADMIN — Medication 12.5 MILLIGRAM(S): at 06:24

## 2017-08-16 RX ADMIN — PIPERACILLIN AND TAZOBACTAM 25 GRAM(S): 4; .5 INJECTION, POWDER, LYOPHILIZED, FOR SOLUTION INTRAVENOUS at 14:21

## 2017-08-16 RX ADMIN — Medication 650 MILLIGRAM(S): at 02:01

## 2017-08-16 RX ADMIN — Medication 250 MILLIGRAM(S): at 06:23

## 2017-08-16 RX ADMIN — Medication 1: at 08:50

## 2017-08-16 RX ADMIN — Medication 650 MILLIGRAM(S): at 03:00

## 2017-08-16 RX ADMIN — Medication 250 MILLIGRAM(S): at 18:09

## 2017-08-16 RX ADMIN — LOSARTAN POTASSIUM 100 MILLIGRAM(S): 100 TABLET, FILM COATED ORAL at 06:23

## 2017-08-16 NOTE — DISCHARGE NOTE ADULT - PATIENT PORTAL LINK FT
“You can access the Follow[employer] Patient Portal, offered by [facility], by registering with the following website: [email or web address]”

## 2017-08-16 NOTE — PROGRESS NOTE ADULT - SUBJECTIVE AND OBJECTIVE BOX
pt seen at bedside in NAD. dressing to right foot  plantar foot puncture wound some cloudy drainage noted  although erythema and edema down  the skin is taut just proximal to wound  recommend MRI Stat to look for abscess   applied packing with DSD  continue abx   will follow up

## 2017-08-16 NOTE — PROGRESS NOTE ADULT - SUBJECTIVE AND OBJECTIVE BOX
Patient is a 72y old  Female who presents with a chief complaint of Right foot cellulitis (15 Aug 2017 17:57)      MEDICINE PGY1 Progress Note spectra [id number]    OVERNIGHT EVENTS: No acute events overnight.  SUBJECTIVE: Patient seen and examined at bedside. Stable, continues to complain of pain in right foot, states improved as compared to yesterday, well-controlled with tylenol. Denies complaints of CP, SOB, abdominal pain/N/V. Tolerating diet.       MEDICATIONS  (STANDING):  losartan 100 milliGRAM(s) Oral daily  levothyroxine 50 MICROGram(s) Oral daily  hydrochlorothiazide 12.5 milliGRAM(s) Oral daily  enoxaparin Injectable 40 milliGRAM(s) SubCutaneous every 24 hours  lactated ringers. 1000 milliLiter(s) (100 mL/Hr) IV Continuous <Continuous>  insulin lispro (HumaLOG) corrective regimen sliding scale   SubCutaneous three times a day before meals  insulin lispro (HumaLOG) corrective regimen sliding scale   SubCutaneous at bedtime  dextrose 50% Injectable 12.5 Gram(s) IV Push once  vancomycin  IVPB 1000 milliGRAM(s) IV Intermittent every 12 hours  piperacillin/tazobactam IVPB. 3.375 Gram(s) IV Intermittent every 8 hours    MEDICATIONS  (PRN):  acetaminophen   Tablet. 650 milliGRAM(s) Oral every 6 hours PRN Moderate Pain (4 - 6)  ondansetron Injectable 4 milliGRAM(s) IV Push every 6 hours PRN Nausea  dextrose Gel 1 Dose(s) Oral once PRN Blood Glucose LESS THAN 70 milliGRAM(s)/deciliter  glucagon  Injectable 1 milliGRAM(s) IntraMuscular once PRN Glucose LESS THAN 70 milligrams/deciliter      T(C): 36.6 (08-16-17 @ 05:18), Max: 37.3 (08-15-17 @ 21:41)  HR: 62 (08-16-17 @ 05:18) (62 - 78)  BP: 154/77 (08-16-17 @ 05:18) (123/52 - 154/77)  RR: 17 (08-16-17 @ 05:18) (17 - 18)  SpO2: 100% (08-16-17 @ 05:18) (98% - 100%)  CAPILLARY BLOOD GLUCOSE  181 (16 Aug 2017 08:36)  134 (15 Aug 2017 21:41)  98 (15 Aug 2017 17:57)        PHYSICAL EXAM  GENERAL: NAD, well-developed  HEAD:  Atraumatic, Normocephalic  EYES: EOMI, PERRLA, conjunctiva and sclera clear  CHEST/LUNG: Clear to auscultation bilaterally; No wheeze  HEART: +S1 +S2, Regular rate and rhythm  ABDOMEN: Soft, Nontender, Nondistended; +Reducible incisional hernias.   NEURO: No focal neurologic deficits, sensation and motor function grossly intact.   EXTREMITIES:  RLE: 2+ edema, erythema, tenderness to palpation. Wrapped in dressing, C/D/I. Warm with peripheral pulses intact. LLE: 1+edema, Pink and warm, Peripheral Pulses intact      LABS:                        11.2   7.29  )-----------( 166      ( 16 Aug 2017 06:45 )             33.0     08-16    143  |  106  |  24<H>  ----------------------------<  106<H>  4.6   |  21<L>  |  0.96    Ca    9.5      16 Aug 2017 06:45  Phos  3.4     08-16  Mg     1.7     08-16    TPro  7.0  /  Alb  4.3  /  TBili  0.6  /  DBili  x   /  AST  19  /  ALT  19  /  AlkPhos  94  08-15        RADIOLOGY & ADDITIONAL TESTS:    Imaging Personally Reviewed:  Consultant(s) Notes Reviewed:    Care Discussed with Consultants/Other Providers:      Dr. Claudia Wilson - Intern  pager [phone]  covered 7pm to 7am by [id number]

## 2017-08-16 NOTE — PROGRESS NOTE ADULT - PROBLEM SELECTOR PLAN 2
Holding home medications (glimepride 2mg po daily, jentadueto 25/1000mg po BID). [id number].  - Continue fingersticks with ISS coverage for now, will monitor and adjust as needed.  - Consistent Carbohydrate Diet as tolerated

## 2017-08-16 NOTE — PROGRESS NOTE ADULT - PROBLEM SELECTOR PLAN 1
Likely secondary to penetrating trauma by some sort of jagged object (rock, nail, glass) given notable skin defect on plantar foot pad of R foot. Likely delayed presentation secondary to diabetic neuropathy, as patient initially noticed the swelling 3 days ago but didn't begin to feel any discomfort until the day prior to presentation. Does not meet SIRS criteria on admission. Received Vanc/Zosyn in ED.  - Continue Vanc/Zosyn for now for MRSA, Pseudomonal and GNR coverage.   - Appreciate podiatry evaluation, recommendations, and wound care.   - Await Blood cultures and wound cultures, will narrow antibiotic coverage as able.  - Area marked, continue to monitor for signs of response to antibiotic therapy.  - Pain control as needed with Tylenol

## 2017-08-16 NOTE — DISCHARGE NOTE ADULT - HOSPITAL COURSE
This is a 72 year old female with a PMHx significant for HTN, T2DM and hypothyroidism who presented on 8/15 to the emergency department with a 3 day history of worsening R foot swelling and pain, found to have right foot cellulitis secondary to puncture wound secondary to diabetic neuropathy. In the ED, patient received a dose of vancomycin and zosyn. Upon admission, patient was evaluated by podiatry, wound was probed and no evidence of purulent drainage was noted. An MRI of the foot was performed and demonstrated... This is a 72 year old female with a PMHx significant for HTN, T2DM and hypothyroidism who presented on 8/15 to the emergency department with a 3 day history of worsening R foot swelling and pain, found to have right foot cellulitis secondary to puncture wound secondary to diabetic neuropathy. In the ED, patient received a dose of vancomycin and zosyn. Upon admission, patient was evaluated by podiatry, wound was probed and no evidence of purulent drainage was noted. An MRI of the foot was performed and demonstrated...    Patient remained afebrile and hemodynamically stable. Patient was considered safe for discharge home on a 10 day course of oral antibiotics (cipro/clinda) and instructions to follow up with podiatry in 1 week. This is a 72 year old female with a PMHx significant for HTN, T2DM and hypothyroidism who presented on 8/15 to the emergency department with a 3 day history of worsening R foot swelling and pain, found to have right foot cellulitis secondary to puncture wound secondary to diabetic neuropathy. In the ED, patient received a dose of vancomycin and zosyn. Upon admission, patient was evaluated by podiatry, wound was probed and no evidence of purulent drainage was noted. An MRI of the foot was performed and showed no evidence of osteomyelitis or bone involvement. Patient remained afebrile and hemodynamically stable. Patient able to ambulate on the foot, tolerate regular diet. Patient was considered safe for discharge home on a 10 day course of oral antibiotics (cipro/clinda) and instructions to follow up with podiatry in 1 week. This is a 72 year old female with a PMHx significant for HTN, T2DM and hypothyroidism who presented on 8/15 to the emergency department with a 3 day history of worsening R foot swelling and pain, found to have right foot cellulitis secondary to puncture wound secondary to diabetic neuropathy. In the ED, patient received a dose of vancomycin and zosyn. Upon admission, patient was evaluated by podiatry, wound was probed and no evidence of purulent drainage was noted. An MRI of the foot was performed and showed no evidence of osteomyelitis or bone involvement. Patient remained afebrile and hemodynamically stable. Patient able to ambulate on the foot, tolerate regular diet. Patient was considered safe for discharge home on a 10 day course of oral antibiotics (cipro/clinda) and instructions to follow up with podiatry in 1 week. She is amenable to dc plan.

## 2017-08-16 NOTE — PROGRESS NOTE ADULT - ATTENDING COMMENTS
I personally saw and evaluated the patient at bedside this afternoon This is a 72 year old F with DM2 (likely c/b neuropathy), HTN, hypothyroidism admitted for right foot cellulitis likely from stepping on an object resulting in a puncture wound. Podiatry recs appreciated, MRI ordered to r/o abscess of patient's foot. If MRI negative, will likely dc patient home with 10 day course of clindamycin and ciprofloxacin. If this occurs, will dw podiatry re: wound care and f/u. Patient did not receive tetanus shot in ED so will give now in light of patient's puncture wound. Patient's A1c is 6.2 revealing well-controlled DM2. Bcx NTD, will f/u wound culture. Patient afebrile and no leukocytosis.

## 2017-08-16 NOTE — DISCHARGE NOTE ADULT - PLAN OF CARE
Right Foot Cellulitis - resolution - May resume regular (diabetic) diet and activity as tolerated.  - Weight-bearing as tolerated to right foot.   - Keep wound site clean and dry.  - Follow up with Podiatry in 2 weeks. Call [phone] to make appointment. - Daily dressing changes with dry sterile gauze  - take all medication as prescribed  - May resume regular (diabetic) diet and activity as tolerated.  - Weight-bearing as tolerated to right foot.   - Keep wound site clean and dry.  - Follow up with Dr. Sam (podiatry) in 1 week. Call [phone] to make appointment. - Daily dressing changes with dry sterile gauze  - Complete a 10 day course of antibiotics: ciprofloxacin and clindamycin, use as directed.  - May resume regular (diabetic) diet and activity as tolerated.  - Weight-bearing as tolerated to right foot.   - Keep wound site clean and dry.  - Follow up with Dr. Sam (podiatry) in 1 week. Call [phone] to make appointment. - Daily dressing changes with dry sterile gauze - just keep your foot clean and dry, wrapped in guaze.  - Complete a 10 day course of antibiotics: ciprofloxacin and clindamycin, use as directed.  - May resume regular (diabetic) diet and activity as tolerated.  - Weight-bearing as tolerated to right foot.   - Keep wound site clean and dry.  - Follow up with Dr. Sam (podiatry) in 1 week. Call [phone] to make appointment.

## 2017-08-16 NOTE — DISCHARGE NOTE ADULT - CARE PLAN
Principal Discharge DX:	Cellulitis  Goal:	Right Foot Cellulitis - resolution  Instructions for follow-up, activity and diet:	- May resume regular (diabetic) diet and activity as tolerated.  - Weight-bearing as tolerated to right foot.   - Keep wound site clean and dry.  - Follow up with Podiatry in 2 weeks. Call [phone] to make appointment. Principal Discharge DX:	Cellulitis  Goal:	Right Foot Cellulitis - resolution  Instructions for follow-up, activity and diet:	- Daily dressing changes with dry sterile gauze  - take all medication as prescribed  - May resume regular (diabetic) diet and activity as tolerated.  - Weight-bearing as tolerated to right foot.   - Keep wound site clean and dry.  - Follow up with Dr. Sam (podiatry) in 1 week. Call [phone] to make appointment. Principal Discharge DX:	Cellulitis  Goal:	Right Foot Cellulitis - resolution  Instructions for follow-up, activity and diet:	- Daily dressing changes with dry sterile gauze  - Complete a 10 day course of antibiotics: ciprofloxacin and clindamycin, use as directed.  - May resume regular (diabetic) diet and activity as tolerated.  - Weight-bearing as tolerated to right foot.   - Keep wound site clean and dry.  - Follow up with Dr. Sam (podiatry) in 1 week. Call [phone] to make appointment. Principal Discharge DX:	Cellulitis  Goal:	Right Foot Cellulitis - resolution  Instructions for follow-up, activity and diet:	- Daily dressing changes with dry sterile gauze - just keep your foot clean and dry, wrapped in guaze.  - Complete a 10 day course of antibiotics: ciprofloxacin and clindamycin, use as directed.  - May resume regular (diabetic) diet and activity as tolerated.  - Weight-bearing as tolerated to right foot.   - Keep wound site clean and dry.  - Follow up with Dr. Sam (podiatry) in 1 week. Call [phone] to make appointment. Principal Discharge DX:	Cellulitis  Goal:	Right Foot Cellulitis - resolution  Instructions for follow-up, activity and diet:	- Daily dressing changes with dry sterile gauze - just keep your foot clean and dry, wrapped in guaze.  - Complete a 10 day course of antibiotics: ciprofloxacin and clindamycin, use as directed.  - May resume regular (diabetic) diet and activity as tolerated.  - Weight-bearing as tolerated to right foot.   - Keep wound site clean and dry.  - Follow up with Dr. Sam (podiatry) in 1 week. Call [phone] to make appointment. Principal Discharge DX:	Cellulitis  Goal:	Right Foot Cellulitis - resolution  Instructions for follow-up, activity and diet:	- Daily dressing changes with dry sterile gauze - just keep your foot clean and dry, wrapped in guaze.  - Complete a 10 day course of antibiotics: ciprofloxacin and clindamycin, use as directed.  - May resume regular (diabetic) diet and activity as tolerated.  - Weight-bearing as tolerated to right foot.   - Keep wound site clean and dry.  - Follow up with Dr. Sam (podiatry) in 1 week. Call [phone] to make appointment.

## 2017-08-16 NOTE — DISCHARGE NOTE ADULT - MEDICATION SUMMARY - MEDICATIONS TO TAKE
I will START or STAY ON the medications listed below when I get home from the hospital:    naproxen 500 mg oral tablet  -- 1 tab(s) by mouth 2 times a day  -- Indication: For pain    Jentadueto 2.5 mg-1000 mg oral tablet  -- 1 tab(s) by mouth 2 times a day  -- Indication: For Diabetes mellitus    glimepiride 2 mg oral tablet  -- 1 tab(s) by mouth once a day  -- Indication: For Diabetes mellitus    losartan-hydrochlorothiazide 100mg-12.5mg oral tablet  -- 1 tab(s) by mouth once a day  -- Indication: For Hypertension    clindamycin 300 mg oral capsule  -- 2 cap(s) by mouth 3 times a day  -- Finish all this medication unless otherwise directed by prescriber.  Medication should be taken with plenty of water.    -- Indication: For Cellulitis    Cipro 500 mg oral tablet  -- 1 tab(s) by mouth 2 times a day  -- Avoid prolonged or excessive exposure to direct and/or artificial sunlight while taking this medication.  Check with your doctor before becoming pregnant.  Do not take dairy products, antacids, or iron preparations within one hour of this medication.  Finish all this medication unless otherwise directed by prescriber.  Medication should be taken with plenty of water.    -- Indication: For Cellulitis    levothyroxine 50 mcg (0.05 mg) oral tablet  -- 1 tab(s) by mouth once a day  -- Indication: For Hypothyroidism    Vitamin D2 50,000 intl units (1.25 mg) oral capsule  -- 1 cap(s) by mouth once on Mondays  -- Indication: For vitamin deficiency

## 2017-08-16 NOTE — DISCHARGE NOTE ADULT - CARE PROVIDER_API CALL
Niecy Daley (JANESSA), Surgery  Dept Director  [address] [place] [address]  Phone: [phone]  Fax: [phone] Trey Sam (DPM), Surgery  [address] [place] [address]  Phone: [phone]  Fax: [phone]

## 2017-08-17 VITALS
HEART RATE: 66 BPM | RESPIRATION RATE: 16 BRPM | TEMPERATURE: 98 F | DIASTOLIC BLOOD PRESSURE: 63 MMHG | OXYGEN SATURATION: 100 % | SYSTOLIC BLOOD PRESSURE: 127 MMHG

## 2017-08-17 LAB — VANCOMYCIN TROUGH SERPL-MCNC: 17.7 UG/ML — SIGNIFICANT CHANGE UP (ref 10–20)

## 2017-08-17 PROCEDURE — 73720 MRI LWR EXTREMITY W/O&W/DYE: CPT | Mod: 26,RT

## 2017-08-17 PROCEDURE — 99239 HOSP IP/OBS DSCHRG MGMT >30: CPT

## 2017-08-17 RX ORDER — MOXIFLOXACIN HYDROCHLORIDE TABLETS, 400 MG 400 MG/1
1 TABLET, FILM COATED ORAL
Qty: 20 | Refills: 0 | OUTPATIENT
Start: 2017-08-17 | End: 2017-08-27

## 2017-08-17 RX ADMIN — Medication 1: at 08:59

## 2017-08-17 RX ADMIN — LOSARTAN POTASSIUM 100 MILLIGRAM(S): 100 TABLET, FILM COATED ORAL at 05:16

## 2017-08-17 RX ADMIN — Medication 12.5 MILLIGRAM(S): at 05:16

## 2017-08-17 RX ADMIN — PIPERACILLIN AND TAZOBACTAM 25 GRAM(S): 4; .5 INJECTION, POWDER, LYOPHILIZED, FOR SOLUTION INTRAVENOUS at 05:16

## 2017-08-17 RX ADMIN — ENOXAPARIN SODIUM 40 MILLIGRAM(S): 100 INJECTION SUBCUTANEOUS at 05:33

## 2017-08-17 RX ADMIN — Medication 50 MICROGRAM(S): at 05:16

## 2017-08-17 RX ADMIN — Medication 250 MILLIGRAM(S): at 06:36

## 2017-08-17 RX ADMIN — TETANUS AND DIPHTHERIA TOXOIDS ADSORBED 0.5 MILLILITER(S): 2; 2 INJECTION INTRAMUSCULAR at 05:17

## 2017-08-17 RX ADMIN — PIPERACILLIN AND TAZOBACTAM 25 GRAM(S): 4; .5 INJECTION, POWDER, LYOPHILIZED, FOR SOLUTION INTRAVENOUS at 14:27

## 2017-08-17 RX ADMIN — Medication 250 MILLIGRAM(S): at 17:49

## 2017-08-17 NOTE — PROGRESS NOTE ADULT - PROBLEM SELECTOR PLAN 2
Holding home medications (glimepride 2mg po daily, jentadueto 25/1000mg po BID). [id number].  - Continue fingersticks with ISS coverage for now, will monitor and adjust as needed.  - Consistent Carbohydrate Diet as tolerated

## 2017-08-17 NOTE — PROGRESS NOTE ADULT - ATTENDING COMMENTS
I personally saw and evaluated the patient at bedside this afternoon. Still with some swelling in the foot, but reports overall improvement. Went for MRI today of the foot which reveals 1cm rim-enhancing fluid collection under her wound but no s/s of osteomyelitis or septic arthritis. Given fluid collection, spoke to podiatry who are ok with discharging patient home on oral antibiotics with follow-up next week. Patient should complete 10 days of ciprofloxacin and clindamycin for abx. She will need to f/u as outpatient with podiatry next week.     DISCHARGE TIME: 40mins

## 2017-08-17 NOTE — PROGRESS NOTE ADULT - SUBJECTIVE AND OBJECTIVE BOX
Patient is a 72y old  Female who presents with a chief complaint of Right foot cellulitis (16 Aug 2017 15:08)       INTERVAL HPI/OVERNIGHT EVENTS:  Patient seen and evaluated at bedside.  Pt is resting comfortable in NAD. Denies N/V/F/C.  Pain is improving and is only present upon palpation of the RIGHT forefoot.    Allergies    No Known Allergies    Intolerances        Vital Signs Last 24 Hrs  T(C): 36.8 (17 Aug 2017 05:30), Max: 37 (16 Aug 2017 21:12)  T(F): 98.3 (17 Aug 2017 05:30), Max: 98.6 (16 Aug 2017 21:12)  HR: 76 (17 Aug 2017 05:30) (60 - 76)  BP: 114/64 (17 Aug 2017 05:30) (114/64 - 134/69)  BP(mean): --  RR: 18 (17 Aug 2017 05:30) (17 - 18)  SpO2: 100% (17 Aug 2017 05:30) (98% - 100%)    LABS:                        11.2   7.29  )-----------( 166      ( 16 Aug 2017 06:45 )             33.0     08-16    143  |  106  |  24<H>  ----------------------------<  106<H>  4.6   |  21<L>  |  0.96    Ca    9.5      16 Aug 2017 06:45  Phos  3.4     08-16  Mg     1.7     08-16    TPro  7.0  /  Alb  4.3  /  TBili  0.6  /  DBili  x   /  AST  19  /  ALT  19  /  AlkPhos  94  08-15        CAPILLARY BLOOD GLUCOSE  193 (16 Aug 2017 21:25)  121 (16 Aug 2017 17:30)  108 (16 Aug 2017 12:02)  181 (16 Aug 2017 08:36)          Lower Extremity Physical Exam:  No change in neurovascular status.  RIGHT submet 2 ulceration to subQ with surrounding dark red erythema.  There is tenderness to palpation of the erythematous area.  Dressings C/D/I.  Packing with scant serous drainage.      RADIOLOGY & ADDITIONAL TESTS:

## 2017-08-17 NOTE — PROGRESS NOTE ADULT - PROBLEM SELECTOR PLAN 1
Likely secondary to penetrating trauma by some sort of jagged object (rock, nail, glass) given notable skin defect on plantar foot pad of R foot. Likely delayed presentation secondary to diabetic neuropathy, as patient initially noticed the swelling 3 days ago but didn't begin to feel any discomfort until the day prior to presentation. Does not meet SIRS criteria on admission. Received Vanc/Zosyn in ED.  - Continue Vanc/Zosyn for now for MRSA, Pseudomonal and GNR coverage.   - Appreciate podiatry evaluation, recommendations, and wound care.   - Awaiting results of MRI to rule out osteomyelitis  - Await Blood cultures and wound cultures, will narrow antibiotic coverage as able.  - Area marked, continue to monitor for signs of response to antibiotic therapy.  - Pain control as needed with Tylenol

## 2017-08-17 NOTE — PROGRESS NOTE ADULT - ASSESSMENT
RIGHT foot submet 2 ulcer UT2B (down to subQ, infected, nonischemic)    Plan: Patient seen and evaluated.  All questions answered to patient's satisfaction.  Pending MRI to r/o abscess.  Cont abx, cellulitis is improving.  PWB to heel RIGHT foot.  Will continue to follow.

## 2017-08-17 NOTE — PROGRESS NOTE ADULT - SUBJECTIVE AND OBJECTIVE BOX
Patient is a 72y old  Female who presents with a chief complaint of Right foot cellulitis (16 Aug 2017 15:08)      MEDICINE PGY1 Progress Note spectra [id number]    OVERNIGHT EVENTS: No acute events overnight.  SUBJECTIVE: Patient seen and examined at bedside. Stable, continues to complain of pain in right foot, states improved as compared to yesterday, well-controlled with tylenol. Denies complaints of CP, SOB, abdominal pain/N/V. Tolerating diet.       MEDICATIONS  (STANDING):  losartan 100 milliGRAM(s) Oral daily  levothyroxine 50 MICROGram(s) Oral daily  hydrochlorothiazide 12.5 milliGRAM(s) Oral daily  enoxaparin Injectable 40 milliGRAM(s) SubCutaneous every 24 hours  insulin lispro (HumaLOG) corrective regimen sliding scale   SubCutaneous three times a day before meals  insulin lispro (HumaLOG) corrective regimen sliding scale   SubCutaneous at bedtime  dextrose 50% Injectable 12.5 Gram(s) IV Push once  vancomycin  IVPB 1000 milliGRAM(s) IV Intermittent every 12 hours  piperacillin/tazobactam IVPB. 3.375 Gram(s) IV Intermittent every 8 hours    MEDICATIONS  (PRN):  acetaminophen   Tablet. 650 milliGRAM(s) Oral every 6 hours PRN Moderate Pain (4 - 6)  ondansetron Injectable 4 milliGRAM(s) IV Push every 6 hours PRN Nausea  dextrose Gel 1 Dose(s) Oral once PRN Blood Glucose LESS THAN 70 milliGRAM(s)/deciliter  glucagon  Injectable 1 milliGRAM(s) IntraMuscular once PRN Glucose LESS THAN 70 milligrams/deciliter      T(C): 36.8 (08-17-17 @ 05:30), Max: 37 (08-16-17 @ 21:12)  HR: 76 (08-17-17 @ 05:30) (60 - 76)  BP: 114/64 (08-17-17 @ 05:30) (114/64 - 134/69)  RR: 18 (08-17-17 @ 05:30) (17 - 18)  SpO2: 100% (08-17-17 @ 05:30) (98% - 100%)  CAPILLARY BLOOD GLUCOSE  179 (17 Aug 2017 08:37)  193 (16 Aug 2017 21:25)  121 (16 Aug 2017 17:30)  108 (16 Aug 2017 12:02)      PHYSICAL EXAM  GENERAL: NAD, well-developed  HEAD:  Atraumatic, Normocephalic  EYES: EOMI, PERRLA, conjunctiva and sclera clear  CHEST/LUNG: Clear to auscultation bilaterally; No wheeze  HEART: +S1 +S2, Regular rate and rhythm  ABDOMEN: Soft, Nontender, Nondistended; +Reducible incisional hernias.   NEURO: No focal neurologic deficits, sensation and motor function grossly intact.   EXTREMITIES:  RLE: 2+ edema, erythema, tenderness to palpation. Wrapped in dressing, C/D/I. Warm with peripheral pulses intact. LLE: 1+edema, Pink and warm, Peripheral Pulses intact        LABS:                        11.2   7.29  )-----------( 166      ( 16 Aug 2017 06:45 )             33.0     08-16    143  |  106  |  24<H>  ----------------------------<  106<H>  4.6   |  21<L>  |  0.96    Ca    9.5      16 Aug 2017 06:45  Phos  3.4     08-16  Mg     1.7     08-16    TPro  7.0  /  Alb  4.3  /  TBili  0.6  /  DBili  x   /  AST  19  /  ALT  19  /  AlkPhos  94  08-15      RADIOLOGY & ADDITIONAL TESTS:    Imaging Personally Reviewed:  Consultant(s) Notes Reviewed:    Care Discussed with Consultants/Other Providers:      Dr. Claudia Wilson - Intern  pager [phone]  covered 7pm to 7am by [id number] Patient is a 72y old  Female who presents with a chief complaint of Right foot cellulitis (16 Aug 2017 15:08)      MEDICINE PGY1 Progress Note spectra [id number]    OVERNIGHT EVENTS: No acute events overnight.  SUBJECTIVE: Patient seen and examined at bedside. Stable, continues to complain of pain in right foot, states improved as compared to yesterday, well-controlled with tylenol. Denies complaints of CP, SOB, abdominal pain/N/V. Tolerating diet.       MEDICATIONS  (STANDING):  losartan 100 milliGRAM(s) Oral daily  levothyroxine 50 MICROGram(s) Oral daily  hydrochlorothiazide 12.5 milliGRAM(s) Oral daily  enoxaparin Injectable 40 milliGRAM(s) SubCutaneous every 24 hours  insulin lispro (HumaLOG) corrective regimen sliding scale   SubCutaneous three times a day before meals  insulin lispro (HumaLOG) corrective regimen sliding scale   SubCutaneous at bedtime  dextrose 50% Injectable 12.5 Gram(s) IV Push once  vancomycin  IVPB 1000 milliGRAM(s) IV Intermittent every 12 hours  piperacillin/tazobactam IVPB. 3.375 Gram(s) IV Intermittent every 8 hours    MEDICATIONS  (PRN):  acetaminophen   Tablet. 650 milliGRAM(s) Oral every 6 hours PRN Moderate Pain (4 - 6)  ondansetron Injectable 4 milliGRAM(s) IV Push every 6 hours PRN Nausea  dextrose Gel 1 Dose(s) Oral once PRN Blood Glucose LESS THAN 70 milliGRAM(s)/deciliter  glucagon  Injectable 1 milliGRAM(s) IntraMuscular once PRN Glucose LESS THAN 70 milligrams/deciliter      T(C): 36.8 (08-17-17 @ 05:30), Max: 37 (08-16-17 @ 21:12)  HR: 76 (08-17-17 @ 05:30) (60 - 76)  BP: 114/64 (08-17-17 @ 05:30) (114/64 - 134/69)  RR: 18 (08-17-17 @ 05:30) (17 - 18)  SpO2: 100% (08-17-17 @ 05:30) (98% - 100%)  CAPILLARY BLOOD GLUCOSE  179 (17 Aug 2017 08:37)  193 (16 Aug 2017 21:25)  121 (16 Aug 2017 17:30)  108 (16 Aug 2017 12:02)      PHYSICAL EXAM  GENERAL: NAD, well-developed  HEAD:  Atraumatic, Normocephalic  EYES: EOMI, PERRLA, conjunctiva and sclera clear  CHEST/LUNG: Clear to auscultation bilaterally; No wheeze  HEART: +S1 +S2, Regular rate and rhythm  ABDOMEN: Soft, Nontender, Nondistended; +Reducible incisional hernias.   NEURO: No focal neurologic deficits, sensation and motor function grossly intact.   EXTREMITIES:  RLE: 2+ edema, erythema, tenderness to palpation. Wrapped in dressing, C/D/I. Warm with peripheral pulses intact. LLE: 1+edema, Pink and warm, Peripheral Pulses intact        LABS:                        11.2   7.29  )-----------( 166      ( 16 Aug 2017 06:45 )             33.0     08-16    143  |  106  |  24<H>  ----------------------------<  106<H>  4.6   |  21<L>  |  0.96    Ca    9.5      16 Aug 2017 06:45  Phos  3.4     08-16  Mg     1.7     08-16    TPro  7.0  /  Alb  4.3  /  TBili  0.6  /  DBili  x   /  AST  19  /  ALT  19  /  AlkPhos  94  08-15      RADIOLOGY & ADDITIONAL TESTS:    Imaging Personally Reviewed: MRI of foot  < from: MRI Foot w/wo Cont, Right (08.17.17 @ 11:55) >  IMPRESSION:  Generalized cellulitic change.     Narrow linear soft tissue puncture defect in plantar forefoot over 2nd   MTP region with a small underlying rim-enhancing collection measuring up   to 1 cm at the base of thedefect.     No MR evidence for osteomyelitis or septic arthritis.    Consultant(s) Notes Reviewed:    Care Discussed with Consultants/Other Providers: Podiatry: Wound care and discharge planning given MRI findings      Dr. Claudia Wilson - Intern  pager [phone]  covered 7pm to 7am by [id number]

## 2017-08-18 LAB — BACTERIA WND CULT: SIGNIFICANT CHANGE UP

## 2017-08-20 LAB — BACTERIA BLD CULT: SIGNIFICANT CHANGE UP

## 2017-09-01 ENCOUNTER — OUTPATIENT (OUTPATIENT)
Dept: OUTPATIENT SERVICES | Facility: HOSPITAL | Age: 72
LOS: 1 days | End: 2017-09-01
Payer: MEDICAID

## 2017-09-08 ENCOUNTER — OUTPATIENT (OUTPATIENT)
Dept: OUTPATIENT SERVICES | Facility: HOSPITAL | Age: 72
LOS: 1 days | End: 2017-09-08
Payer: COMMERCIAL

## 2017-09-08 ENCOUNTER — APPOINTMENT (OUTPATIENT)
Dept: PODIATRY | Facility: HOSPITAL | Age: 72
End: 2017-09-08

## 2017-09-08 VITALS
DIASTOLIC BLOOD PRESSURE: 80 MMHG | BODY MASS INDEX: 35.51 KG/M2 | WEIGHT: 193 LBS | SYSTOLIC BLOOD PRESSURE: 147 MMHG | RESPIRATION RATE: 14 BRPM | HEART RATE: 62 BPM | HEIGHT: 62 IN

## 2017-09-08 DIAGNOSIS — M79.609 PAIN IN UNSPECIFIED LIMB: ICD-10-CM

## 2017-09-08 DIAGNOSIS — M54.16 RADICULOPATHY, LUMBAR REGION: ICD-10-CM

## 2017-09-08 DIAGNOSIS — L97.512 NON-PRESSURE CHRONIC ULCER OF OTHER PART OF RIGHT FOOT WITH FAT LAYER EXPOSED: ICD-10-CM

## 2017-09-08 PROCEDURE — G0463: CPT

## 2017-09-26 ENCOUNTER — INPATIENT (INPATIENT)
Facility: HOSPITAL | Age: 72
LOS: 7 days | Discharge: HOME | End: 2017-10-04
Attending: HOSPITALIST | Admitting: HOSPITALIST
Payer: MEDICARE

## 2017-09-26 VITALS
DIASTOLIC BLOOD PRESSURE: 66 MMHG | RESPIRATION RATE: 16 BRPM | HEART RATE: 71 BPM | SYSTOLIC BLOOD PRESSURE: 143 MMHG | OXYGEN SATURATION: 100 % | TEMPERATURE: 98 F

## 2017-09-26 DIAGNOSIS — E87.2 ACIDOSIS: ICD-10-CM

## 2017-09-26 DIAGNOSIS — L03.115 CELLULITIS OF RIGHT LOWER LIMB: ICD-10-CM

## 2017-09-26 DIAGNOSIS — I10 ESSENTIAL (PRIMARY) HYPERTENSION: ICD-10-CM

## 2017-09-26 DIAGNOSIS — Z79.01 LONG TERM (CURRENT) USE OF ANTICOAGULANTS: ICD-10-CM

## 2017-09-26 DIAGNOSIS — E03.9 HYPOTHYROIDISM, UNSPECIFIED: ICD-10-CM

## 2017-09-26 DIAGNOSIS — M12.9 ARTHROPATHY, UNSPECIFIED: ICD-10-CM

## 2017-09-26 DIAGNOSIS — E87.0 HYPEROSMOLALITY AND HYPERNATREMIA: ICD-10-CM

## 2017-09-26 DIAGNOSIS — E11.9 TYPE 2 DIABETES MELLITUS WITHOUT COMPLICATIONS: ICD-10-CM

## 2017-09-26 LAB
ALBUMIN SERPL ELPH-MCNC: 4.6 G/DL — SIGNIFICANT CHANGE UP (ref 3.3–5)
ALP SERPL-CCNC: 110 U/L — SIGNIFICANT CHANGE UP (ref 40–120)
ALT FLD-CCNC: 22 U/L — SIGNIFICANT CHANGE UP (ref 4–33)
AST SERPL-CCNC: 22 U/L — SIGNIFICANT CHANGE UP (ref 4–32)
BASE EXCESS BLDV CALC-SCNC: 1 MMOL/L — SIGNIFICANT CHANGE UP
BASOPHILS # BLD AUTO: 0.04 K/UL — SIGNIFICANT CHANGE UP (ref 0–0.2)
BASOPHILS NFR BLD AUTO: 0.5 % — SIGNIFICANT CHANGE UP (ref 0–2)
BILIRUB SERPL-MCNC: 0.5 MG/DL — SIGNIFICANT CHANGE UP (ref 0.2–1.2)
BLOOD GAS VENOUS - CREATININE: 0.9 MG/DL — SIGNIFICANT CHANGE UP (ref 0.5–1.3)
BUN SERPL-MCNC: 23 MG/DL — SIGNIFICANT CHANGE UP (ref 7–23)
CALCIUM SERPL-MCNC: 9.6 MG/DL — SIGNIFICANT CHANGE UP (ref 8.4–10.5)
CHLORIDE BLDV-SCNC: 104 MMOL/L — SIGNIFICANT CHANGE UP (ref 96–108)
CHLORIDE SERPL-SCNC: 107 MMOL/L — SIGNIFICANT CHANGE UP (ref 98–107)
CO2 SERPL-SCNC: 22 MMOL/L — SIGNIFICANT CHANGE UP (ref 22–31)
CREAT SERPL-MCNC: 0.96 MG/DL — SIGNIFICANT CHANGE UP (ref 0.5–1.3)
CRP SERPL-MCNC: 37.3 MG/L — HIGH
EOSINOPHIL # BLD AUTO: 0.17 K/UL — SIGNIFICANT CHANGE UP (ref 0–0.5)
EOSINOPHIL NFR BLD AUTO: 2.3 % — SIGNIFICANT CHANGE UP (ref 0–6)
ERYTHROCYTE [SEDIMENTATION RATE] IN BLOOD: 38 MM/HR — HIGH (ref 4–25)
GAS PNL BLDV: 141 MMOL/L — SIGNIFICANT CHANGE UP (ref 136–146)
GLUCOSE BLDV-MCNC: 70 — SIGNIFICANT CHANGE UP (ref 70–99)
GLUCOSE SERPL-MCNC: 70 MG/DL — SIGNIFICANT CHANGE UP (ref 70–99)
HCO3 BLDV-SCNC: 23 MMOL/L — SIGNIFICANT CHANGE UP (ref 20–27)
HCT VFR BLD CALC: 35.4 % — SIGNIFICANT CHANGE UP (ref 34.5–45)
HCT VFR BLDV CALC: 37.7 % — SIGNIFICANT CHANGE UP (ref 34.5–45)
HGB BLD-MCNC: 12.1 G/DL — SIGNIFICANT CHANGE UP (ref 11.5–15.5)
HGB BLDV-MCNC: 12.2 G/DL — SIGNIFICANT CHANGE UP (ref 11.5–15.5)
IMM GRANULOCYTES # BLD AUTO: 0.02 # — SIGNIFICANT CHANGE UP
IMM GRANULOCYTES NFR BLD AUTO: 0.3 % — SIGNIFICANT CHANGE UP (ref 0–1.5)
LACTATE BLDV-MCNC: 1.3 MMOL/L — SIGNIFICANT CHANGE UP (ref 0.5–2)
LYMPHOCYTES # BLD AUTO: 1.6 K/UL — SIGNIFICANT CHANGE UP (ref 1–3.3)
LYMPHOCYTES # BLD AUTO: 21.4 % — SIGNIFICANT CHANGE UP (ref 13–44)
MCHC RBC-ENTMCNC: 32.4 PG — SIGNIFICANT CHANGE UP (ref 27–34)
MCHC RBC-ENTMCNC: 34.2 % — SIGNIFICANT CHANGE UP (ref 32–36)
MCV RBC AUTO: 94.9 FL — SIGNIFICANT CHANGE UP (ref 80–100)
MONOCYTES # BLD AUTO: 0.61 K/UL — SIGNIFICANT CHANGE UP (ref 0–0.9)
MONOCYTES NFR BLD AUTO: 8.2 % — SIGNIFICANT CHANGE UP (ref 2–14)
NEUTROPHILS # BLD AUTO: 5.04 K/UL — SIGNIFICANT CHANGE UP (ref 1.8–7.4)
NEUTROPHILS NFR BLD AUTO: 67.3 % — SIGNIFICANT CHANGE UP (ref 43–77)
NRBC # FLD: 0 — SIGNIFICANT CHANGE UP
PCO2 BLDV: 58 MMHG — HIGH (ref 41–51)
PH BLDV: 7.29 PH — LOW (ref 7.32–7.43)
PLATELET # BLD AUTO: 205 K/UL — SIGNIFICANT CHANGE UP (ref 150–400)
PMV BLD: 9.9 FL — SIGNIFICANT CHANGE UP (ref 7–13)
PO2 BLDV: 27 MMHG — LOW (ref 35–40)
POTASSIUM BLDV-SCNC: 4 MMOL/L — SIGNIFICANT CHANGE UP (ref 3.4–4.5)
POTASSIUM SERPL-MCNC: 4.2 MMOL/L — SIGNIFICANT CHANGE UP (ref 3.5–5.3)
POTASSIUM SERPL-SCNC: 4.2 MMOL/L — SIGNIFICANT CHANGE UP (ref 3.5–5.3)
PROT SERPL-MCNC: 7.3 G/DL — SIGNIFICANT CHANGE UP (ref 6–8.3)
RBC # BLD: 3.73 M/UL — LOW (ref 3.8–5.2)
RBC # FLD: 12.4 % — SIGNIFICANT CHANGE UP (ref 10.3–14.5)
SAO2 % BLDV: 40.1 % — LOW (ref 60–85)
SODIUM SERPL-SCNC: 148 MMOL/L — HIGH (ref 135–145)
WBC # BLD: 7.48 K/UL — SIGNIFICANT CHANGE UP (ref 3.8–10.5)
WBC # FLD AUTO: 7.48 K/UL — SIGNIFICANT CHANGE UP (ref 3.8–10.5)

## 2017-09-26 PROCEDURE — 73630 X-RAY EXAM OF FOOT: CPT | Mod: 26,RT

## 2017-09-26 PROCEDURE — 99223 1ST HOSP IP/OBS HIGH 75: CPT | Mod: GC

## 2017-09-26 PROCEDURE — 73610 X-RAY EXAM OF ANKLE: CPT | Mod: 26,RT

## 2017-09-26 RX ORDER — DEXTROSE 50 % IN WATER 50 %
25 SYRINGE (ML) INTRAVENOUS ONCE
Qty: 0 | Refills: 0 | Status: DISCONTINUED | OUTPATIENT
Start: 2017-09-26 | End: 2017-10-04

## 2017-09-26 RX ORDER — LOSARTAN POTASSIUM 100 MG/1
100 TABLET, FILM COATED ORAL DAILY
Qty: 0 | Refills: 0 | Status: DISCONTINUED | OUTPATIENT
Start: 2017-09-26 | End: 2017-10-04

## 2017-09-26 RX ORDER — INSULIN LISPRO 100/ML
VIAL (ML) SUBCUTANEOUS AT BEDTIME
Qty: 0 | Refills: 0 | Status: DISCONTINUED | OUTPATIENT
Start: 2017-09-26 | End: 2017-10-04

## 2017-09-26 RX ORDER — PIPERACILLIN AND TAZOBACTAM 4; .5 G/20ML; G/20ML
3.38 INJECTION, POWDER, LYOPHILIZED, FOR SOLUTION INTRAVENOUS ONCE
Qty: 0 | Refills: 0 | Status: COMPLETED | OUTPATIENT
Start: 2017-09-26 | End: 2017-09-26

## 2017-09-26 RX ORDER — DEXTROSE 50 % IN WATER 50 %
12.5 SYRINGE (ML) INTRAVENOUS ONCE
Qty: 0 | Refills: 0 | Status: DISCONTINUED | OUTPATIENT
Start: 2017-09-26 | End: 2017-10-04

## 2017-09-26 RX ORDER — LEVOTHYROXINE SODIUM 125 MCG
50 TABLET ORAL DAILY
Qty: 0 | Refills: 0 | Status: DISCONTINUED | OUTPATIENT
Start: 2017-09-26 | End: 2017-10-04

## 2017-09-26 RX ORDER — GLUCAGON INJECTION, SOLUTION 0.5 MG/.1ML
1 INJECTION, SOLUTION SUBCUTANEOUS ONCE
Qty: 0 | Refills: 0 | Status: DISCONTINUED | OUTPATIENT
Start: 2017-09-26 | End: 2017-10-04

## 2017-09-26 RX ORDER — VANCOMYCIN HCL 1 G
1000 VIAL (EA) INTRAVENOUS ONCE
Qty: 0 | Refills: 0 | Status: COMPLETED | OUTPATIENT
Start: 2017-09-26 | End: 2017-09-26

## 2017-09-26 RX ORDER — SODIUM CHLORIDE 9 MG/ML
1000 INJECTION, SOLUTION INTRAVENOUS
Qty: 0 | Refills: 0 | Status: DISCONTINUED | OUTPATIENT
Start: 2017-09-26 | End: 2017-10-04

## 2017-09-26 RX ORDER — SODIUM CHLORIDE 9 MG/ML
1000 INJECTION, SOLUTION INTRAVENOUS
Qty: 0 | Refills: 0 | Status: DISCONTINUED | OUTPATIENT
Start: 2017-09-26 | End: 2017-09-27

## 2017-09-26 RX ORDER — INSULIN LISPRO 100/ML
VIAL (ML) SUBCUTANEOUS
Qty: 0 | Refills: 0 | Status: DISCONTINUED | OUTPATIENT
Start: 2017-09-26 | End: 2017-10-04

## 2017-09-26 RX ORDER — ENOXAPARIN SODIUM 100 MG/ML
40 INJECTION SUBCUTANEOUS DAILY
Qty: 0 | Refills: 0 | Status: DISCONTINUED | OUTPATIENT
Start: 2017-09-26 | End: 2017-10-04

## 2017-09-26 RX ORDER — ERGOCALCIFEROL 1.25 MG/1
50000 CAPSULE ORAL
Qty: 0 | Refills: 0 | Status: DISCONTINUED | OUTPATIENT
Start: 2017-09-26 | End: 2017-10-04

## 2017-09-26 RX ORDER — HYDROCHLOROTHIAZIDE 25 MG
12.5 TABLET ORAL DAILY
Qty: 0 | Refills: 0 | Status: DISCONTINUED | OUTPATIENT
Start: 2017-09-26 | End: 2017-10-04

## 2017-09-26 RX ORDER — GLIMEPIRIDE 1 MG
1 TABLET ORAL
Qty: 0 | Refills: 0 | COMMUNITY

## 2017-09-26 RX ORDER — DEXTROSE 50 % IN WATER 50 %
1 SYRINGE (ML) INTRAVENOUS ONCE
Qty: 0 | Refills: 0 | Status: DISCONTINUED | OUTPATIENT
Start: 2017-09-26 | End: 2017-10-04

## 2017-09-26 RX ADMIN — Medication 250 MILLIGRAM(S): at 19:51

## 2017-09-26 RX ADMIN — PIPERACILLIN AND TAZOBACTAM 200 GRAM(S): 4; .5 INJECTION, POWDER, LYOPHILIZED, FOR SOLUTION INTRAVENOUS at 18:51

## 2017-09-26 NOTE — ED PROVIDER NOTE - PHYSICAL EXAMINATION
Attending/Bianca: NAD; PERRL/EOMI, non-icterus, supple, no RADHIKA, no JVD, RRR, CTAB; Abd-soft, NT/ND, no HSM; Rt foot-swelling, dorsal erythema, ventral aspect-open wound w/ no drainage, A&Ox3, nonfocal

## 2017-09-26 NOTE — ED PROVIDER NOTE - MEDICAL DECISION MAKING DETAILS
Pt is a 71 y/o F nonsmoker PMHx HTN, DM type 2, cholecystectomy, appendectomy p/w right foot swelling and redness x 4 days -- concerning for infected diabetic foot ulcer -- labs, esr, crp, xray, abx, podiatry consult, admit

## 2017-09-26 NOTE — H&P ADULT - PROBLEM SELECTOR PLAN 1
-recurrent infection w/ differnential as stated above  -c/w vanc, zosyn, f/u wound and blood cultures   -f/u official read of right foot xray, pending MRI, podiatry following and no intervention needed pending MRI Due to Rt foot sub 2nd met head ulcer to capsule w/ cellulitis; r/o OM;  -c/w vanc, zosyn, f/u wound and blood cultures   -f/u official read of right foot xray, pending MRI, podiatry following and no intervention needed pending MRI  -fall precautions  -will need PT eval Due to Rt foot sub 2nd met head ulcer to capsule w/ cellulitis; r/o OM;  -c/w vanc, zosyn, f/u wound and blood cultures   -f/u Vancomycin trough prior 4th dose  -f/u official read of right foot xray, pending MRI, podiatry following and no intervention needed pending MRI  -fall precautions  -will need PT eval

## 2017-09-26 NOTE — ED PROVIDER NOTE - PROGRESS NOTE DETAILS
MINOO DE LA CRUZ:  Pt seen by podiatry who recommends medicine admission.  Pt accepted to Dr. Owen.  MAR text paged today.

## 2017-09-26 NOTE — H&P ADULT - HISTORY OF PRESENT ILLNESS
72 year old female with a PMHx significant for HTN, T2DM and hypothyroidism who presented on     Last hospital admission: 8/15 to the emergency department with a 3 day history of worsening R foot swelling and pain, found to have right foot cellulitis secondary to puncture wound secondary to diabetic neuropathy. Evaluated by podiatry, wound was probed and no evidence of purulent drainage was noted. An MRI of the foot was performed and showed no evidence of osteomyelitis or bone involvement. Discharge home on a 10 day course of oral antibiotics (cipro/clinda). 72 year old female with a PMHx significant for HTN, T2DM and hypothyroidism who p/w right foot swelling and redness x 4 days. Last hospital admission: 8/15 to the emergency department with a 3 day history of worsening R foot swelling and pain, found to have right foot cellulitis secondary to puncture wound secondary to diabetic neuropathy. Evaluated by podiatry, wound was probed and no evidence of purulent drainage was noted. An MRI of the foot was performed and showed no evidence of osteomyelitis or bone involvement. Discharge home on a 10 day course of oral antibiotics (cipro/clinda). Completed abx and now states for past 4 days had developed gradual onset, worsening recurrence of right foot redness, warmth, and pain.  Pt states she saw her podiatrist, Dr. Troy Mooney today, who directed pt to [facility] ED for evaluation due to possible "Diabetic abscess with sinus tract, 2nd MPJ Right Foot." Foot probed by podiatry in ED with no purulent drainage.     Denies fevers, CP, SOB, N/V/D/C, dysuria, change in strength/weakness, melena.     Vitals in ED WNL. Given vanc, zosyn. 72 year old female with a PMHx significant for HTN, T2DM and hypothyroidism who p/w right foot swelling and redness x 4 days. Last hospital admission: 8/15 to the emergency department with a 3 day history of worsening R foot swelling and pain, found to have right foot cellulitis secondary to puncture wound secondary to diabetic neuropathy. Evaluated by podiatry, wound was probed and no evidence of purulent drainage was noted. An MRI of the foot was performed and showed no evidence of osteomyelitis or bone involvement. Discharge home on a 10 day course of oral antibiotics (cipro/clinda). Completed abx w/ resolution of infection. Now states for past 4 days had developed gradual onset, worsening recurrence of right foot redness, warmth, and pain, in same location of previous infection.   Pt states she saw her podiatrist, Dr. Troy Mooney today, who directed pt to [facility] ED for evaluation due to possible diabetic abscess. Per pt, purulunet drainage was noted when foot probed as an outpt. Reports she has "felt off" past few days, w/ decreased appetitie and PO intake. Denies fevers, CP, SOB, N/V/D/C, dysuria, change in strength/weakness, melena.     Vitals in ED WNL. Given vanc, zosyn. Evaluated by podiatry, no purulent drainage. 72 year old female with a PMHx significant for HTN, Type 2 DM and hypothyroidism who is c/o right foot swelling and redness x 4 days. Last hospital admission: 8/15 for right foot cellulitis secondary to puncture wound due to diabetic neuropathy, MRI showed no evidence of osteomyelitis or bone involvement, was discharged home on a 10 day course of oral antibiotics (Cipro/Clindamyci). Pt completed abx w/ resolution of infection. At present the pt states that  for the past 4 days has developed gradual onset, worsening recurrence of right foot redness, warmth, and pain, in same location of previous infection.   Pt states she saw her podiatrist, Dr. Troy Mooney today, who directed pt to [facility] ED for evaluation due to possible diabetic abscess. Per pt, purulunet drainage was noted when foot probed as an outpt. Reports she has "felt off" past few days, w/ decreased appetite and oral intake. Reports no fevers, CP, SOB, N/V/D/C, dysuria, change in strength/weakness, melena.     ED course: Vancomycin x 1 dose IV, Zosyn x 1 dose IV. Evaluated by podiatry sx; 72 year old female with Hx significant for HTN, Type 2 DM and hypothyroidism who is c/o right foot swelling and redness x 4 days. Last hospital admission: 8/15 for right foot cellulitis secondary to puncture wound due to diabetic neuropathy, MRI showed no evidence of osteomyelitis or bone involvement, was discharged home on a 10 day course of oral antibiotics (Cipro/Clindamyci). Pt completed abx w/ resolution of infection. At present the pt states that  for the past 4 days has developed gradual onset, worsening recurrence of right foot redness, warmth, and pain, in same location of previous infection.   Pt states she saw her podiatrist, Dr. Troy Mooney today, who directed pt to [facility] ED for evaluation due to possible diabetic abscess. Per pt, purulunet drainage was noted when foot probed as an outpt. Reports she has "felt off" past few days, w/ decreased appetite and oral intake. Reports no fevers, CP, SOB, N/V/D/C, dysuria, change in strength/weakness, melena.     ED course: Vancomycin x 1 dose IV, Zosyn x 1 dose IV. Evaluated by podiatry sx;

## 2017-09-26 NOTE — H&P ADULT - NSHPSOCIALHISTORY_GEN_ALL_CORE
no IVDU, tobacco, or ETOH no IVDU, former smoker, quit >10 years ago, no ETOH Lives at home with  and family  Performs all ADLs independently  Former Smoker, quit 30-40 years ago.  Social alcohol use  Denies drug use  No IVDU

## 2017-09-26 NOTE — H&P ADULT - ASSESSMENT
2 year old female with a PMHx significant for HTN, T2DM and hypothyroidism, recent admission for right foot cellulitis s/p course of abx now p/w recurrent right foot swelling and redness x 4 days concerning for cellulitis vs underlying abscess vs osteo in setting of inc ESR/CRP: 72 year old female with Hx significant for HTN, Type 2 DM and hypothyroidism, recent admission for right foot cellulitis s/p course of PO abx now a/w recurrent right foot cellulitis vs underlying abscess vs osteo in the context of DM peripheral neuropathy c/b unstable gait due to pain. Found to be dehydrated with an increased anion GAP likely due to fasting ketosis, no evidence of hyperglycemia;

## 2017-09-26 NOTE — H&P ADULT - PROBLEM SELECTOR PLAN 3
-pt with AG acidosis on labs likely 2/2 starvation ketoacidosis in setting of decrease PO intake  -c/w IVF as above. check b-hydroxybutyrate -pt with AG acidosis on labs likely 2/2 fasting ketoacidosis in setting of decrease PO intake  -c/w IVF as above. check conf. b-hydroxybutyrate

## 2017-09-26 NOTE — H&P ADULT - LYMPHATIC
posterior cervical L/anterior cervical L/posterior cervical R/supraclavicular R/inguinal R/supraclavicular L/anterior cervical R/inguinal L

## 2017-09-26 NOTE — ED ADULT NURSE NOTE - ED STAT RN HANDOFF DETAILS
pt left unit at 0330, pt awake, alert, vitals as noted, fluids and meds running as ordered, no infiltration noted

## 2017-09-26 NOTE — H&P ADULT - PMH
Abdominal swelling  Pelvic mass  Arthropathy  Arthritis  Essential hypertension  HTN (hypertension)  Hypothyroidism  Adult hypothyroidism  Obesity    Type 2 diabetes mellitus  type 2

## 2017-09-26 NOTE — CONSULT NOTE ADULT - ASSESSMENT
71 y/o F w/ R foot sub 2nd met head ulcer to capsule w/ cellulitis  - Pt seen and examined  - 10 cc 1% lidocaine plain given about 2nd met  - 1st interspace stab incision made to explore for deep abscess  - Rec admission for IV abx  - Rec MRI to rule out OM vs deep abscess vs cellulitis  - Rec IV vanco/ zosyn  - Wounds packed and dressed with DSD  - No pod sx intervention at this time. Further intervention (p) MRI results  - Will d/w attending 73 y/o F w/ R foot sub 2nd met head ulcer to capsule w/ cellulitis  - Pt seen and examined  - 10 cc 1% lidocaine plain given about 2nd met  - 1st interspace stab incision made to explore for deep abscess. No drainable collections noted.  - Rec admission for IV abx  - Rec MRI to rule out OM vs deep abscess vs cellulitis  - Rec IV vanco/ zosyn  - Wounds packed and dressed with DSD  - No pod sx intervention at this time. Further intervention (p) MRI results  - Will d/w attending

## 2017-09-26 NOTE — H&P ADULT - PROBLEM SELECTOR PLAN 2
-pt with calculated free water deficit of about 2L given decrease PO intaake over past two days   -will start IVF: LR @75 cc/hr -pt with calculated free water deficit of about 2L given decrease PO intaake over past two days   -will start IVF: LR @100 cc/hr -dehydration with mild free water deficit due to reduced PO intake over past two days   -will start IVF 1/2NS @75 cc/hr

## 2017-09-26 NOTE — H&P ADULT - NSHPPHYSICALEXAM_GEN_ALL_CORE
Vital Signs Last 24 Hrs  T(C): 36.7 (09-26-17 @ 21:40), Max: 36.8 (09-26-17 @ 15:51)  T(F): 98 (09-26-17 @ 21:40), Max: 98.3 (09-26-17 @ 15:51)  HR: 74 (09-26-17 @ 21:40) (54 - 74)  BP: 124/55 (09-26-17 @ 21:40) (111/78 - 143/66)  BP(mean): --  RR: 15 (09-26-17 @ 21:40) (15 - 16)  SpO2: 100% (09-26-17 @ 21:40) (99% - 100%)    GENERAL: NAD  HEENT: EOMI, MMM, no oropharyngeal lesions or erythema appreciated  Pulm: normal work of breathing, CTABL  CV: RRR, S1&S2+, no m/r/g appreciated  ABDOMEN: soft, nt, nd, no hepatosplenomegaly  MSK: nl ROM  EXTREMITIES:  no appreciable edema in b/l LE  Neuro: A&Ox3, no focal deficits  SKIN: warm and dry, no visible rash Vital Signs Last 24 Hrs  T(C): 36.7 (09-26-17 @ 21:40), Max: 36.8 (09-26-17 @ 15:51)  T(F): 98 (09-26-17 @ 21:40), Max: 98.3 (09-26-17 @ 15:51)  HR: 74 (09-26-17 @ 21:40) (54 - 74)  BP: 124/55 (09-26-17 @ 21:40) (111/78 - 143/66)  BP(mean): --  RR: 15 (09-26-17 @ 21:40) (15 - 16)  SpO2: 100% (09-26-17 @ 21:40) (99% - 100%)    GENERAL: NAD  HEENT: EOMI, MMM, no oropharyngeal lesions or erythema appreciated  Pulm: normal work of breathing, CTABL  CV: RRR, S1&S2+, no m/r/g appreciated  ABDOMEN: soft, nt, nd, no hepatosplenomegaly  MSK: nl ROM  EXTREMITIES:  no appreciable edema in b/l LE  Neuro: A&Ox3, no focal deficits  SKIN: 2nd metatarsal edematous, w/ erythema, tender

## 2017-09-26 NOTE — ED ADULT NURSE NOTE - OBJECTIVE STATEMENT
Received pt. in rm 14, A&Ox4, c/o right foot redness and swelling x 4 days. Pt. was sent by Podiatrist to r/o cellulitis. +2 pitting edema to right foot with redness around base of 1st-3rd digit. also c/o tingling in that area. Open wound approx. 0.5 inch long located on bottom of foot below 2nd digit. c/o 9/10 pain at this time and ambulating with cane. #20g IV placed in left AC, labs sent. MD at bedside for eval. VS done as charted, breathing well on RA. Will continue to monitor.

## 2017-09-26 NOTE — CONSULT NOTE ADULT - SUBJECTIVE AND OBJECTIVE BOX
Podiatry pager #: [phone]    Patient is a 72y old  Female who presents with a chief complaint of foot pain    HPI: Pt is a 71 y/o F PMHx HTN, DM type 2, cholecystectomy, appendectomy p/w right foot swelling and redness x 4 days.  Pt states she was admitted last month for right foot ulcer and cellulitis which was treated with IV antibiotics and discharged on cipro and clindamycin.  Pt states she completed antibiotics.  Pt states for past 4 days had developed gradual onset, worsening recurrence of right foot redness, warmth, and pain.  Pt states she saw her podiatrist, Dr. Troy Mooney today, who directed pt to [facility] ED for evaluation due to possible "Diabetic abscess with sinus tract, 2nd MPJ Right Foot."  Pt denies any fevers, chills, numbness, weakness, inability to walk, trauma, body aches, lightheadedness.    PAST MEDICAL & SURGICAL HISTORY:  Type 2 diabetes mellitus: type 2  Hypothyroidism: Adult hypothyroidism  Arthropathy: Arthritis  Essential hypertension: HTN (hypertension)  Abdominal swelling: Pelvic mass  Hyperlipemia  Obesity  Hypothyroidism  DM (Diabetes Mellitus)  HTN  Elective surgery for purposes other than treating health conditions: Gall Bladder removal  Other postprocedural status: S/P appendectomy  Elective surgery for purposes other than treating health conditions: Uterine Biospy  S/P Appendectomy    MEDICATIONS  (STANDING):  vancomycin  IVPB 1000 milliGRAM(s) IV Intermittent once  piperacillin/tazobactam IVPB. 3.375 Gram(s) IV Intermittent once    MEDICATIONS  (PRN):    Allergies    No Known Allergies    Intolerances    VITALS:    Vital Signs Last 24 Hrs  T(C): 36.8 (26 Sep 2017 15:51), Max: 36.8 (26 Sep 2017 15:51)  T(F): 98.3 (26 Sep 2017 15:51), Max: 98.3 (26 Sep 2017 15:51)  HR: 62 (26 Sep 2017 17:37) (62 - 71)  BP: 111/78 (26 Sep 2017 17:37) (111/78 - 143/66)  BP(mean): --  RR: 16 (26 Sep 2017 17:37) (16 - 16)  SpO2: 100% (26 Sep 2017 17:37) (100% - 100%)    LABS:                    12.1   7.48  )-----------( 205      ( 26 Sep 2017 17:39 )             35.4     09-26    148<H>  |  107  |  23  ----------------------------<  70  4.2   |  22  |  0.96    Ca    9.6      26 Sep 2017 17:39    TPro  7.3  /  Alb  4.6  /  TBili  0.5  /  DBili  x   /  AST  22  /  ALT  22  /  AlkPhos  110  09-26    CAPILLARY BLOOD GLUCOSE    LOWER EXTREMITY PHYSICAL EXAM:    Vasular: DP/PT _/4, B/L, CFT <_ seconds B/L, Temperature gradient _, B/L.   Neuro: Epicritic sensation _ to the level of _, B/L.  Musculoskeletal/Ortho:  Skin:  Wound #1:   Location:  Size:  Depth:  Wound bed:   Drainage:   Odor:   Periwound:  Etiology:     RADIOLOGY & ADDITIONAL STUDIES: Podiatry pager #: [phone]    Patient is a 72y old  Female who presents with a chief complaint of foot pain    HPI: Pt is a 71 y/o F PMHx HTN, DM type 2, cholecystectomy, appendectomy p/w right foot swelling and redness x 4 days.  Pt states she was admitted last month for right foot ulcer and cellulitis which was treated with IV antibiotics and discharged on cipro and clindamycin.  Pt states she completed antibiotics.  Pt states for past 4 days had developed gradual onset, worsening recurrence of right foot redness, warmth, and pain.  Pt states she saw her podiatrist, Dr. Troy Mooney today, who directed pt to [facility] ED for evaluation due to possible "Diabetic abscess with sinus tract, 2nd MPJ Right Foot."  Pt denies any fevers, chills, numbness, weakness, inability to walk, trauma, body aches, lightheadedness.    PAST MEDICAL & SURGICAL HISTORY:  Type 2 diabetes mellitus: type 2  Hypothyroidism: Adult hypothyroidism  Arthropathy: Arthritis  Essential hypertension: HTN (hypertension)  Abdominal swelling: Pelvic mass  Hyperlipemia  Obesity  Hypothyroidism  DM (Diabetes Mellitus)  HTN  Elective surgery for purposes other than treating health conditions: Gall Bladder removal  Other postprocedural status: S/P appendectomy  Elective surgery for purposes other than treating health conditions: Uterine Biospy  S/P Appendectomy    MEDICATIONS  (STANDING):  vancomycin  IVPB 1000 milliGRAM(s) IV Intermittent once  piperacillin/tazobactam IVPB. 3.375 Gram(s) IV Intermittent once    MEDICATIONS  (PRN):    Allergies    No Known Allergies    Intolerances    VITALS:    Vital Signs Last 24 Hrs  T(C): 36.8 (26 Sep 2017 15:51), Max: 36.8 (26 Sep 2017 15:51)  T(F): 98.3 (26 Sep 2017 15:51), Max: 98.3 (26 Sep 2017 15:51)  HR: 62 (26 Sep 2017 17:37) (62 - 71)  BP: 111/78 (26 Sep 2017 17:37) (111/78 - 143/66)  BP(mean): --  RR: 16 (26 Sep 2017 17:37) (16 - 16)  SpO2: 100% (26 Sep 2017 17:37) (100% - 100%)    LABS:                    12.1   7.48  )-----------( 205      ( 26 Sep 2017 17:39 )             35.4     09-26    148<H>  |  107  |  23  ----------------------------<  70  4.2   |  22  |  0.96    Ca    9.6      26 Sep 2017 17:39    TPro  7.3  /  Alb  4.6  /  TBili  0.5  /  DBili  x   /  AST  22  /  ALT  22  /  AlkPhos  110  09-26    CAPILLARY BLOOD GLUCOSE    LOWER EXTREMITY PHYSICAL EXAM:    Vasular: DP/PT 2/4, B/L, Temperature gradient WNL, B/L.   Neuro: Epicritic sensation intact to the level of the digits, B/L.  Musculoskeletal/Ortho: No gross pedal deformities noted, B/L.  Skin:  Wound #1:   Location: Sub 2nd met head  Size: Aprox 0.5 cm x 0.5 cm  Depth: to capsule  Wound bed: granular  Drainage: none  Odor: none  Periwound: Edematous and erythematous, mild crepitus noted upon ROM of 2nd met, fluctuance in 1st interspace    RADIOLOGY & ADDITIONAL STUDIES:  xray foot pending Podiatry pager #: [phone]    Patient is a 72y old  Female who presents with a chief complaint of foot pain    HPI: Pt is a 71 y/o F PMHx HTN, DM type 2, cholecystectomy, appendectomy p/w right foot swelling and redness x 4 days.  Pt states she was admitted last month for right foot ulcer and cellulitis which was treated with IV antibiotics and discharged on cipro and clindamycin.  Pt states she completed antibiotics.  Pt states for past 4 days had developed gradual onset, worsening recurrence of right foot redness, warmth, and pain.  Pt states she saw her podiatrist, Dr. Troy Mooney today, who directed pt to [facility] ED for evaluation due to possible "Diabetic abscess with sinus tract, 2nd MPJ Right Foot."  Pt denies any fevers, chills, numbness, weakness, inability to walk, trauma, body aches, lightheadedness.    PAST MEDICAL & SURGICAL HISTORY:  Type 2 diabetes mellitus: type 2  Hypothyroidism: Adult hypothyroidism  Arthropathy: Arthritis  Essential hypertension: HTN (hypertension)  Abdominal swelling: Pelvic mass  Hyperlipemia  Obesity  Hypothyroidism  DM (Diabetes Mellitus)  HTN  Elective surgery for purposes other than treating health conditions: Gall Bladder removal  Other postprocedural status: S/P appendectomy  Elective surgery for purposes other than treating health conditions: Uterine Biospy  S/P Appendectomy    MEDICATIONS  (STANDING):  vancomycin  IVPB 1000 milliGRAM(s) IV Intermittent once  piperacillin/tazobactam IVPB. 3.375 Gram(s) IV Intermittent once    MEDICATIONS  (PRN):    Allergies    No Known Allergies    Intolerances    VITALS:    Vital Signs Last 24 Hrs  T(C): 36.8 (26 Sep 2017 15:51), Max: 36.8 (26 Sep 2017 15:51)  T(F): 98.3 (26 Sep 2017 15:51), Max: 98.3 (26 Sep 2017 15:51)  HR: 62 (26 Sep 2017 17:37) (62 - 71)  BP: 111/78 (26 Sep 2017 17:37) (111/78 - 143/66)  BP(mean): --  RR: 16 (26 Sep 2017 17:37) (16 - 16)  SpO2: 100% (26 Sep 2017 17:37) (100% - 100%)    LABS:                    12.1   7.48  )-----------( 205      ( 26 Sep 2017 17:39 )             35.4     09-26    148<H>  |  107  |  23  ----------------------------<  70  4.2   |  22  |  0.96    Ca    9.6      26 Sep 2017 17:39    TPro  7.3  /  Alb  4.6  /  TBili  0.5  /  DBili  x   /  AST  22  /  ALT  22  /  AlkPhos  110  09-26    CAPILLARY BLOOD GLUCOSE    LOWER EXTREMITY PHYSICAL EXAM:    Vasular: DP/PT 2/4, B/L, Temperature gradient WNL, B/L.   Neuro: Epicritic sensation intact to the level of the digits, B/L.  Musculoskeletal/Ortho: No gross pedal deformities noted, B/L.  Skin:  Wound #1:   Location: Sub 2nd met head R foot  Size: Aprox 0.5 cm x 0.5 cm  Depth: to capsule  Wound bed: granular  Drainage: none  Odor: none  Periwound: Edematous and erythematous, mild crepitus noted upon ROM of 2nd met, fluctuance in 1st interspace    RADIOLOGY & ADDITIONAL STUDIES:  xray foot pending

## 2017-09-26 NOTE — H&P ADULT - NSHPLABSRESULTS_GEN_ALL_CORE
12.1   7.48  )-----------( 205      ( 26 Sep 2017 17:39 )             35.4       09-26    148<H>  |  107  |  23  ----------------------------<  70  4.2   |  22  |  0.96    Ca    9.6      26 Sep 2017 17:39    TPro  7.3  /  Alb  4.6  /  TBili  0.5  /  DBili  x   /  AST  22  /  ALT  22  /  AlkPhos  110  09-26          Lactate Trend      CAPILLARY BLOOD GLUCOSE  157 (26 Sep 2017 21:40)        Cultures:    Radiology:    EKG: 12.1   7.48  )-----------( 205      ( 26 Sep 2017 17:39 )             35.4       09-26    148<H>  |  107  |  23  ----------------------------<  70  4.2   |  22  |  0.96    Ca    9.6      26 Sep 2017 17:39    TPro  7.3  /  Alb  4.6  /  TBili  0.5  /  DBili  x   /  AST  22  /  ALT  22  /  AlkPhos  110  09-26            Cultures: blood and wound cultures sent    Radiology: prelim read of xray foot with no evidence of osteo     EKG: no admission EKG

## 2017-09-26 NOTE — ED PROVIDER NOTE - OBJECTIVE STATEMENT
Pt is a 73 y/o F nonsmoker PMHx HTN, DM type 2, cholecystectomy, appendectomy p/w right foot swelling and redness x 4 days.  Pt states she was admitted last month for right foot ulcer and cellulitis which was treated with IV antibiotics and discharged on cipro and clindamycin.  Pt states she completed antibiotics.  Pt states for past 4 days had developed gradual onset, worsening recurrence of right foot redness, warmth, and pain.  Pt states she saw her podiatrist, Dr. Troy Mooney today, who directed pt to [facility] ED for evaluation due to possible "Diabetic abscess with sinus tract, 2nd MPJ Right Foot."  Pt denies any fevers, chills, numbness, weakness, inability to walk, trauma, body aches, lightheadedness. Pt is a 73 y/o F nonsmoker PMHx HTN, DM type 2, cholecystectomy, appendectomy p/w right foot swelling and redness x 4 days.  Pt states she was admitted last month for right foot ulcer and cellulitis which was treated with IV antibiotics and discharged on cipro and clindamycin.  Pt states she completed antibiotics.  Pt states for past 4 days had developed gradual onset, worsening recurrence of right foot redness, warmth, and pain.  Pt states she saw her podiatrist, Dr. Troy Mooney today, who directed pt to [facility] ED for evaluation due to possible "Diabetic abscess with sinus tract, 2nd MPJ Right Foot."  Pt denies any fevers, chills, numbness, weakness, inability to walk, trauma, body aches, lightheadedness.    Attending/Bianca: 71 yo F as described above, admitted ~ 4 weeks go for RLE cellulitis now with reoccurrence 4 days of Rt foot swelling and redness. Seen and eval by podiatry today and referred to the ED. Pt notes that podiatrist drained pus from wound today. +Subjective fever/chills. no back pain or weakness. Pt with weight-bearing activities.

## 2017-09-26 NOTE — ED ADULT NURSE NOTE - PSH
Elective surgery for purposes other than treating health conditions  Gall Bladder removal  Elective surgery for purposes other than treating health conditions  Uterine Biospy  Other postprocedural status  S/P appendectomy  S/P Appendectomy

## 2017-09-26 NOTE — H&P ADULT - NSHPREVIEWOFSYSTEMS_GEN_ALL_CORE
REVIEW OF SYSTEMS:    CONSTITUTIONAL: No weakness, fevers or chills  EYES/ENT: No visual changes;  no throat pain   NECK: No pain or stiffness  RESPIRATORY: No cough, wheezing, hemoptysis; No shortness of breath  CARDIOVASCULAR: No chest pain or palpitations  GASTROINTESTINAL: No abdominal or epigastric pain. No nausea, vomiting, or hematemesis; No diarrhea or constipation. No melena or hematochezia.  GENITOURINARY: No dysuria, change in frequency or hematuria  NEUROLOGICAL: No numbness or weakness  SKIN: No itching, burning, rashes, or lesions   All other review of systems is negative unless indicated above. REVIEW OF SYSTEMS:    CONSTITUTIONAL: No weakness, fevers or chills, (+) decrease appetite   EYES/ENT: No visual changes;  no throat pain   NECK: No pain or stiffness  RESPIRATORY: No cough, wheezing, hemoptysis; No shortness of breath  CARDIOVASCULAR: No chest pain or palpitations  GASTROINTESTINAL: No abdominal or epigastric pain. No nausea, vomiting, or hematemesis; No diarrhea or constipation. No melena or hematochezia.  GENITOURINARY: No dysuria, change in frequency or hematuria  NEUROLOGICAL: No numbness or weakness  SKIN: (+) swelling, pain right 2nd toe   All other review of systems is negative unless indicated above. REVIEW OF SYSTEMS:    CONSTITUTIONAL: No weakness, fevers or chills, (+) decrease appetite   EYES/ENT: No visual changes;  no throat pain   NECK: No pain, no stiffness  RESPIRATORY: No cough, wheezing, hemoptysis; No shortness of breath  CARDIOVASCULAR: No chest pain or palpitations  GASTROINTESTINAL: No abdominal or epigastric pain. No nausea, vomiting, or hematemesis; No diarrhea or constipation. No melena or hematochezia.  GENITOURINARY: No dysuria, change in frequency or hematuria  NEUROLOGICAL: No numbness or weakness  SKIN: (+) swelling, pain right 2nd toe   All other review of systems is negative unless indicated above.

## 2017-09-26 NOTE — ED PROVIDER NOTE - SKIN WOUND TYPE
2 cm ulcer to plantar aspect of right foot with surrounding erythema; +6 cm x 8 cm area of erythema, with warmth and tenderness without crepitus to dorsum of right foot

## 2017-09-27 DIAGNOSIS — R69 ILLNESS, UNSPECIFIED: ICD-10-CM

## 2017-09-27 LAB
B-OH-BUTYR SERPL-SCNC: 0.1 MMOL/L — SIGNIFICANT CHANGE UP (ref 0–0.4)
BASOPHILS # BLD AUTO: 0.02 K/UL — SIGNIFICANT CHANGE UP (ref 0–0.2)
BASOPHILS NFR BLD AUTO: 0.3 % — SIGNIFICANT CHANGE UP (ref 0–2)
BUN SERPL-MCNC: 21 MG/DL — SIGNIFICANT CHANGE UP (ref 7–23)
CALCIUM SERPL-MCNC: 9 MG/DL — SIGNIFICANT CHANGE UP (ref 8.4–10.5)
CHLORIDE SERPL-SCNC: 102 MMOL/L — SIGNIFICANT CHANGE UP (ref 98–107)
CO2 SERPL-SCNC: 22 MMOL/L — SIGNIFICANT CHANGE UP (ref 22–31)
CREAT SERPL-MCNC: 0.85 MG/DL — SIGNIFICANT CHANGE UP (ref 0.5–1.3)
EOSINOPHIL # BLD AUTO: 0.21 K/UL — SIGNIFICANT CHANGE UP (ref 0–0.5)
EOSINOPHIL NFR BLD AUTO: 3.6 % — SIGNIFICANT CHANGE UP (ref 0–6)
ERYTHROCYTE [SEDIMENTATION RATE] IN BLOOD: 34 MM/HR — HIGH (ref 4–25)
GLUCOSE SERPL-MCNC: 129 MG/DL — HIGH (ref 70–99)
HCT VFR BLD CALC: 32.3 % — LOW (ref 34.5–45)
HGB BLD-MCNC: 10.9 G/DL — LOW (ref 11.5–15.5)
IMM GRANULOCYTES # BLD AUTO: 0.01 # — SIGNIFICANT CHANGE UP
IMM GRANULOCYTES NFR BLD AUTO: 0.2 % — SIGNIFICANT CHANGE UP (ref 0–1.5)
LYMPHOCYTES # BLD AUTO: 0.98 K/UL — LOW (ref 1–3.3)
LYMPHOCYTES # BLD AUTO: 16.9 % — SIGNIFICANT CHANGE UP (ref 13–44)
MCHC RBC-ENTMCNC: 31.7 PG — SIGNIFICANT CHANGE UP (ref 27–34)
MCHC RBC-ENTMCNC: 33.7 % — SIGNIFICANT CHANGE UP (ref 32–36)
MCV RBC AUTO: 93.9 FL — SIGNIFICANT CHANGE UP (ref 80–100)
MONOCYTES # BLD AUTO: 0.53 K/UL — SIGNIFICANT CHANGE UP (ref 0–0.9)
MONOCYTES NFR BLD AUTO: 9.1 % — SIGNIFICANT CHANGE UP (ref 2–14)
NEUTROPHILS # BLD AUTO: 4.05 K/UL — SIGNIFICANT CHANGE UP (ref 1.8–7.4)
NEUTROPHILS NFR BLD AUTO: 69.9 % — SIGNIFICANT CHANGE UP (ref 43–77)
NRBC # FLD: 0 — SIGNIFICANT CHANGE UP
PLATELET # BLD AUTO: 179 K/UL — SIGNIFICANT CHANGE UP (ref 150–400)
PMV BLD: 10.6 FL — SIGNIFICANT CHANGE UP (ref 7–13)
POTASSIUM SERPL-MCNC: 3.5 MMOL/L — SIGNIFICANT CHANGE UP (ref 3.5–5.3)
POTASSIUM SERPL-SCNC: 3.5 MMOL/L — SIGNIFICANT CHANGE UP (ref 3.5–5.3)
RBC # BLD: 3.44 M/UL — LOW (ref 3.8–5.2)
RBC # FLD: 12.7 % — SIGNIFICANT CHANGE UP (ref 10.3–14.5)
SODIUM SERPL-SCNC: 138 MMOL/L — SIGNIFICANT CHANGE UP (ref 135–145)
SPECIMEN SOURCE: SIGNIFICANT CHANGE UP
SPECIMEN SOURCE: SIGNIFICANT CHANGE UP
WBC # BLD: 5.8 K/UL — SIGNIFICANT CHANGE UP (ref 3.8–10.5)
WBC # FLD AUTO: 5.8 K/UL — SIGNIFICANT CHANGE UP (ref 3.8–10.5)

## 2017-09-27 PROCEDURE — 93010 ELECTROCARDIOGRAM REPORT: CPT

## 2017-09-27 PROCEDURE — 73720 MRI LWR EXTREMITY W/O&W/DYE: CPT | Mod: 26,RT

## 2017-09-27 PROCEDURE — 99233 SBSQ HOSP IP/OBS HIGH 50: CPT

## 2017-09-27 RX ORDER — VANCOMYCIN HCL 1 G
1000 VIAL (EA) INTRAVENOUS EVERY 12 HOURS
Qty: 0 | Refills: 0 | Status: DISCONTINUED | OUTPATIENT
Start: 2017-09-27 | End: 2017-10-01

## 2017-09-27 RX ORDER — PIPERACILLIN AND TAZOBACTAM 4; .5 G/20ML; G/20ML
3.38 INJECTION, POWDER, LYOPHILIZED, FOR SOLUTION INTRAVENOUS EVERY 8 HOURS
Qty: 0 | Refills: 0 | Status: DISCONTINUED | OUTPATIENT
Start: 2017-09-27 | End: 2017-10-02

## 2017-09-27 RX ORDER — SODIUM CHLORIDE 9 MG/ML
1000 INJECTION, SOLUTION INTRAVENOUS
Qty: 0 | Refills: 0 | Status: DISCONTINUED | OUTPATIENT
Start: 2017-09-27 | End: 2017-09-27

## 2017-09-27 RX ADMIN — Medication 12.5 MILLIGRAM(S): at 05:15

## 2017-09-27 RX ADMIN — PIPERACILLIN AND TAZOBACTAM 25 GRAM(S): 4; .5 INJECTION, POWDER, LYOPHILIZED, FOR SOLUTION INTRAVENOUS at 03:14

## 2017-09-27 RX ADMIN — LOSARTAN POTASSIUM 100 MILLIGRAM(S): 100 TABLET, FILM COATED ORAL at 05:15

## 2017-09-27 RX ADMIN — SODIUM CHLORIDE 100 MILLILITER(S): 9 INJECTION, SOLUTION INTRAVENOUS at 01:00

## 2017-09-27 RX ADMIN — Medication 250 MILLIGRAM(S): at 10:41

## 2017-09-27 RX ADMIN — ENOXAPARIN SODIUM 40 MILLIGRAM(S): 100 INJECTION SUBCUTANEOUS at 14:18

## 2017-09-27 RX ADMIN — SODIUM CHLORIDE 75 MILLILITER(S): 9 INJECTION, SOLUTION INTRAVENOUS at 04:36

## 2017-09-27 RX ADMIN — PIPERACILLIN AND TAZOBACTAM 25 GRAM(S): 4; .5 INJECTION, POWDER, LYOPHILIZED, FOR SOLUTION INTRAVENOUS at 14:18

## 2017-09-27 RX ADMIN — Medication 50 MICROGRAM(S): at 05:15

## 2017-09-27 RX ADMIN — Medication 250 MILLIGRAM(S): at 21:19

## 2017-09-27 RX ADMIN — PIPERACILLIN AND TAZOBACTAM 25 GRAM(S): 4; .5 INJECTION, POWDER, LYOPHILIZED, FOR SOLUTION INTRAVENOUS at 22:53

## 2017-09-27 RX ADMIN — SODIUM CHLORIDE 75 MILLILITER(S): 9 INJECTION, SOLUTION INTRAVENOUS at 10:41

## 2017-09-27 RX ADMIN — SODIUM CHLORIDE 75 MILLILITER(S): 9 INJECTION, SOLUTION INTRAVENOUS at 00:31

## 2017-09-27 NOTE — PROGRESS NOTE ADULT - ASSESSMENT
72 year old female with Hx significant for HTN, Type 2 DM and hypothyroidism, recent admission for right foot cellulitis s/p course of PO abx now a/w recurrent right foot cellulitis vs underlying abscess vs osteo in the context of DM peripheral neuropathy c/b unstable gait due to pain referred from podiatry office

## 2017-09-27 NOTE — PROGRESS NOTE ADULT - SUBJECTIVE AND OBJECTIVE BOX
Patient is a 72y old  Female who presents with a chief complaint of Right foot swelling, pain    SUBJECTIVE / OVERNIGHT EVENTS:    Mild pain on foot s/p podiatrist eval this am but declines pain meds   No f/c, no n/v  Pt states 1 wk after dc there was a tack in her sneaker    MEDICATIONS  (STANDING):  levothyroxine 50 MICROGram(s) Oral daily  ergocalciferol 91186 Unit(s) Oral <User Schedule>  hydrochlorothiazide 12.5 milliGRAM(s) Oral daily  losartan 100 milliGRAM(s) Oral daily  insulin lispro (HumaLOG) corrective regimen sliding scale   SubCutaneous three times a day before meals  insulin lispro (HumaLOG) corrective regimen sliding scale   SubCutaneous at bedtime  dextrose 5%. 1000 milliLiter(s) (50 mL/Hr) IV Continuous <Continuous>  dextrose 50% Injectable 12.5 Gram(s) IV Push once  dextrose 50% Injectable 25 Gram(s) IV Push once  dextrose 50% Injectable 25 Gram(s) IV Push once  enoxaparin Injectable 40 milliGRAM(s) SubCutaneous daily  piperacillin/tazobactam IVPB. 3.375 Gram(s) IV Intermittent every 8 hours  vancomycin  IVPB 1000 milliGRAM(s) IV Intermittent every 12 hours  sodium chloride 0.45%. 1000 milliLiter(s) (75 mL/Hr) IV Continuous <Continuous>    MEDICATIONS  (PRN):  naproxen 500 milliGRAM(s) Oral every 12 hours PRN arthritis pain  dextrose Gel 1 Dose(s) Oral once PRN Blood Glucose LESS THAN 70 milliGRAM(s)/deciliter  glucagon  Injectable 1 milliGRAM(s) IntraMuscular once PRN Glucose LESS THAN 70 milligrams/deciliter      T(C): 36.3 (09-27-17 @ 04:58), Max: 36.8 (09-26-17 @ 15:51)  HR: 60 (09-27-17 @ 04:58) (54 - 74)  BP: 118/71 (09-27-17 @ 04:58) (111/78 - 143/66)  RR: 18 (09-27-17 @ 04:58) (15 - 18)  SpO2: 100% (09-27-17 @ 04:58) (98% - 100%)    CAPILLARY BLOOD GLUCOSE  134 (27 Sep 2017 13:28)  121 (27 Sep 2017 07:25)  157 (26 Sep 2017 21:40)    PHYSICAL EXAM:  GENERAL: NAD, well-developed  CHEST/LUNG: Clear to auscultation bilaterally; No wheeze  HEART: Regular rate and rhythm; No murmurs, rubs, or gallops  ABDOMEN: Soft, Nontender, Nondistended; Bowel sounds present  EXTREMITIES:   R foot recently wrapped, dressing c/d/i  PSYCH: AAOx3  NEUROLOGY: non-focal    LABS:                        10.9   5.80  )-----------( 179      ( 27 Sep 2017 05:45 )             32.3     09-27    138  |  102  |  21  ----------------------------<  129<H>  3.5   |  22  |  0.85    Ca    9.0      27 Sep 2017 05:50    TPro  7.3  /  Alb  4.6  /  TBili  0.5  /  DBili  x   /  AST  22  /  ALT  22  /  AlkPhos  110  09-26    Microbiology:   G 09-26 @ 17:39  pH: 7.29/pCO2: 58/pO2: 27/HCO3: 23/lactate: 1.3      Consultant(s) Notes Reviewed:  podiatry     Care Discussed with Consultants/Other Providers:

## 2017-09-27 NOTE — PROGRESS NOTE ADULT - PROBLEM SELECTOR PLAN 1
Due to Rt foot sub 2nd met head ulcer to capsule w/ cellulitis; r/o OM;  -c/w vanc, zosyn, f/u wound and blood cultures   -f/u Vancomycin trough prior 4th dose  -official read of right foot xray w/o osteo, pending MRI, podiatry following and no intervention needed pending MRI  -fall precautions  -will need PT eval

## 2017-09-27 NOTE — PATIENT PROFILE ADULT. - NS TRANSFER PATIENT BELONGINGS
Clothing/hang bag, wallet, earrings, 2 credit cards, $20, tote bag/Wrist Watch/Jewelry/Money (specify)

## 2017-09-27 NOTE — ADVANCED PRACTICE NURSE CONSULT - REASON FOR CONSULT
Patient appropriately followed by Podiatry surgery with wound care recommendations. Podiatry surgery currently performing dressing changes.    Please contact Wound Care Service Line if we can be of further assistance (ext [phone]).

## 2017-09-27 NOTE — PROGRESS NOTE ADULT - SUBJECTIVE AND OBJECTIVE BOX
pt seen at bedside in NAD. dressing to right foot c/d/i  right foot plantar ulcer deep no probing to bone no pus, decreased erythema  decreased edema  applied wet to dry packing with DSD  follow up MRI  continue abx

## 2017-09-28 LAB
BUN SERPL-MCNC: 23 MG/DL — SIGNIFICANT CHANGE UP (ref 7–23)
CALCIUM SERPL-MCNC: 9.1 MG/DL — SIGNIFICANT CHANGE UP (ref 8.4–10.5)
CHLORIDE SERPL-SCNC: 103 MMOL/L — SIGNIFICANT CHANGE UP (ref 98–107)
CO2 SERPL-SCNC: 24 MMOL/L — SIGNIFICANT CHANGE UP (ref 22–31)
CREAT SERPL-MCNC: 1.09 MG/DL — SIGNIFICANT CHANGE UP (ref 0.5–1.3)
GLUCOSE SERPL-MCNC: 144 MG/DL — HIGH (ref 70–99)
HCT VFR BLD CALC: 33.2 % — LOW (ref 34.5–45)
HGB BLD-MCNC: 11.4 G/DL — LOW (ref 11.5–15.5)
MCHC RBC-ENTMCNC: 31.9 PG — SIGNIFICANT CHANGE UP (ref 27–34)
MCHC RBC-ENTMCNC: 34.3 % — SIGNIFICANT CHANGE UP (ref 32–36)
MCV RBC AUTO: 93 FL — SIGNIFICANT CHANGE UP (ref 80–100)
NRBC # FLD: 0 — SIGNIFICANT CHANGE UP
PLATELET # BLD AUTO: 182 K/UL — SIGNIFICANT CHANGE UP (ref 150–400)
PMV BLD: 10.1 FL — SIGNIFICANT CHANGE UP (ref 7–13)
POTASSIUM SERPL-MCNC: 4.1 MMOL/L — SIGNIFICANT CHANGE UP (ref 3.5–5.3)
POTASSIUM SERPL-SCNC: 4.1 MMOL/L — SIGNIFICANT CHANGE UP (ref 3.5–5.3)
RBC # BLD: 3.57 M/UL — LOW (ref 3.8–5.2)
RBC # FLD: 12.3 % — SIGNIFICANT CHANGE UP (ref 10.3–14.5)
SODIUM SERPL-SCNC: 140 MMOL/L — SIGNIFICANT CHANGE UP (ref 135–145)
SPECIMEN SOURCE: SIGNIFICANT CHANGE UP
VANCOMYCIN TROUGH SERPL-MCNC: 17.1 UG/ML — SIGNIFICANT CHANGE UP (ref 10–20)
WBC # BLD: 4.98 K/UL — SIGNIFICANT CHANGE UP (ref 3.8–10.5)
WBC # FLD AUTO: 4.98 K/UL — SIGNIFICANT CHANGE UP (ref 3.8–10.5)

## 2017-09-28 PROCEDURE — 99233 SBSQ HOSP IP/OBS HIGH 50: CPT

## 2017-09-28 RX ADMIN — PIPERACILLIN AND TAZOBACTAM 25 GRAM(S): 4; .5 INJECTION, POWDER, LYOPHILIZED, FOR SOLUTION INTRAVENOUS at 22:00

## 2017-09-28 RX ADMIN — Medication 250 MILLIGRAM(S): at 10:47

## 2017-09-28 RX ADMIN — ENOXAPARIN SODIUM 40 MILLIGRAM(S): 100 INJECTION SUBCUTANEOUS at 15:23

## 2017-09-28 RX ADMIN — Medication 1: at 17:58

## 2017-09-28 RX ADMIN — PIPERACILLIN AND TAZOBACTAM 25 GRAM(S): 4; .5 INJECTION, POWDER, LYOPHILIZED, FOR SOLUTION INTRAVENOUS at 05:46

## 2017-09-28 RX ADMIN — Medication 1: at 09:24

## 2017-09-28 RX ADMIN — Medication 250 MILLIGRAM(S): at 20:38

## 2017-09-28 RX ADMIN — LOSARTAN POTASSIUM 100 MILLIGRAM(S): 100 TABLET, FILM COATED ORAL at 05:47

## 2017-09-28 RX ADMIN — Medication 12.5 MILLIGRAM(S): at 05:47

## 2017-09-28 RX ADMIN — PIPERACILLIN AND TAZOBACTAM 25 GRAM(S): 4; .5 INJECTION, POWDER, LYOPHILIZED, FOR SOLUTION INTRAVENOUS at 15:23

## 2017-09-28 RX ADMIN — Medication 50 MICROGRAM(S): at 05:47

## 2017-09-28 NOTE — PROGRESS NOTE ADULT - SUBJECTIVE AND OBJECTIVE BOX
Patient is a 72y old  Female who presents with a chief complaint of Right foot swelling, pain (26 Sep 2017 23:02)       INTERVAL HPI/OVERNIGHT EVENTS:  Patient seen and evaluated at bedside.  Pt is resting comfortable in NAD. Denies N/V/F/C.  Denies pain, reports decrease in erythema and edema.    Allergies    No Known Allergies    Intolerances        Vital Signs Last 24 Hrs  T(C): 36.7 (28 Sep 2017 05:42), Max: 36.8 (27 Sep 2017 15:00)  T(F): 98.1 (28 Sep 2017 05:42), Max: 98.3 (27 Sep 2017 15:00)  HR: 57 (28 Sep 2017 05:42) (57 - 63)  BP: 134/63 (28 Sep 2017 05:42) (113/58 - 134/63)  BP(mean): --  RR: 18 (28 Sep 2017 05:42) (18 - 18)  SpO2: 99% (28 Sep 2017 05:42) (98% - 99%)    LABS:                        11.4   4.98  )-----------( 182      ( 28 Sep 2017 06:28 )             33.2     09-28    140  |  103  |  23  ----------------------------<  144<H>  4.1   |  24  |  1.09    Ca    9.1      28 Sep 2017 06:28    TPro  7.3  /  Alb  4.6  /  TBili  0.5  /  DBili  x   /  AST  22  /  ALT  22  /  AlkPhos  110  09-26        CAPILLARY BLOOD GLUCOSE  177 (28 Sep 2017 09:08)  138 (27 Sep 2017 21:35)  81 (27 Sep 2017 18:55)  134 (27 Sep 2017 13:28)          Lower Extremity Physical Exam:  Vasular: DP/PT 2/4, B/L, Temperature gradient WNL, B/L.   Neuro: Epicritic sensation intact to the level of the digits, B/L.  Musculoskeletal/Ortho: No gross pedal deformities noted, B/L.  Skin:  Wound #1:   Location: Sub 2nd met head R foot  Size: Aprox 0.5 cm x 0.5 cm  Depth: to capsule  Wound bed: granular  Drainage: none  Odor: none  Periwound: Edematous and erythematous, mild crepitus noted upon ROM of 2nd met, fluctuance in 1st interspace    RADIOLOGY & ADDITIONAL TESTS:

## 2017-09-28 NOTE — CONSULT NOTE ADULT - ASSESSMENT
72 year old female with Hx significant for HTN, Type 2 DM and hypothyroidism, recent admission for right foot cellulitis s/p course of PO abx (clinda/cipro x 10d) now a/w recurrent R foot cellulitis in context of DM peripheral neuropathy and recent puncture wound from tac through sneaker. Wound culture growing h. flu but still c/f possible underlying pseudomonal infection given history. Patient with no systemic symptoms or signs of abscess or osteomyelitis on imaging.    -c/w vanc and zosyn  -f/u with final read and sensitivities of wound culture  -if conditions worsens, repeat imaging  -f/u podiatry recs

## 2017-09-28 NOTE — PROGRESS NOTE ADULT - SUBJECTIVE AND OBJECTIVE BOX
Patient is a 72y old  Female who presents with a chief complaint of Right foot swelling, pain    SUBJECTIVE / OVERNIGHT EVENTS:    No overnight events  IV in hand is bother her (RN currently dc'ing it)  No CP, no SOB, tolerating diet     MEDICATIONS  (STANDING):  levothyroxine 50 MICROGram(s) Oral daily  ergocalciferol 74168 Unit(s) Oral <User Schedule>  hydrochlorothiazide 12.5 milliGRAM(s) Oral daily  losartan 100 milliGRAM(s) Oral daily  insulin lispro (HumaLOG) corrective regimen sliding scale   SubCutaneous three times a day before meals  insulin lispro (HumaLOG) corrective regimen sliding scale   SubCutaneous at bedtime  dextrose 5%. 1000 milliLiter(s) (50 mL/Hr) IV Continuous <Continuous>  dextrose 50% Injectable 12.5 Gram(s) IV Push once  dextrose 50% Injectable 25 Gram(s) IV Push once  dextrose 50% Injectable 25 Gram(s) IV Push once  enoxaparin Injectable 40 milliGRAM(s) SubCutaneous daily  piperacillin/tazobactam IVPB. 3.375 Gram(s) IV Intermittent every 8 hours  vancomycin  IVPB 1000 milliGRAM(s) IV Intermittent every 12 hours    MEDICATIONS  (PRN):  naproxen 500 milliGRAM(s) Oral every 12 hours PRN arthritis pain  dextrose Gel 1 Dose(s) Oral once PRN Blood Glucose LESS THAN 70 milliGRAM(s)/deciliter  glucagon  Injectable 1 milliGRAM(s) IntraMuscular once PRN Glucose LESS THAN 70 milligrams/deciliter    T(C): 36.7 (09-28-17 @ 05:42), Max: 36.8 (09-27-17 @ 15:00)  HR: 57 (09-28-17 @ 05:42) (57 - 63)  BP: 134/63 (09-28-17 @ 05:42) (113/58 - 134/63)  RR: 18 (09-28-17 @ 05:42) (18 - 18)  SpO2: 99% (09-28-17 @ 05:42) (98% - 99%)    CAPILLARY BLOOD GLUCOSE  132 (28 Sep 2017 12:57)  177 (28 Sep 2017 09:08)  138 (27 Sep 2017 21:35)  81 (27 Sep 2017 18:55)    PHYSICAL EXAM:  GENERAL: NAD, well-developed  CHEST/LUNG: Clear to auscultation bilaterally; No wheeze  HEART: Regular rate and rhythm; No murmurs, rubs, or gallops  ABDOMEN: Soft, Nontender, Nondistended; Bowel sounds present  EXTREMITIES:   R foot dressing c/d/i  PSYCH: AAOx3  NEUROLOGY: non-focal    LABS:                        11.4   4.98  )-----------( 182      ( 28 Sep 2017 06:28 )             33.2     09-28    140  |  103  |  23  ----------------------------<  144<H>  4.1   |  24  |  1.09    Ca    9.1      28 Sep 2017 06:28    TPro  7.3  /  Alb  4.6  /  TBili  0.5  /  DBili  x   /  AST  22  /  ALT  22  /  AlkPhos  110  09-26      Microbiology:   VBG 09-26 @ 17:39  pH: 7.29/pCO2: 58/pO2: 27/HCO3: 23/lactate: 1.3        Consultant(s) Notes Reviewed:  pod, ID    Care Discussed with Consultants/Other Providers: Patient is a 72y old  Female who presents with a chief complaint of Right foot swelling, pain    SUBJECTIVE / OVERNIGHT EVENTS:    No overnight events  IV in hand is bothering her (RN currently dc'ing it)  No CP, no SOB, tolerating diet     MEDICATIONS  (STANDING):  levothyroxine 50 MICROGram(s) Oral daily  ergocalciferol 91770 Unit(s) Oral <User Schedule>  hydrochlorothiazide 12.5 milliGRAM(s) Oral daily  losartan 100 milliGRAM(s) Oral daily  insulin lispro (HumaLOG) corrective regimen sliding scale   SubCutaneous three times a day before meals  insulin lispro (HumaLOG) corrective regimen sliding scale   SubCutaneous at bedtime  dextrose 5%. 1000 milliLiter(s) (50 mL/Hr) IV Continuous <Continuous>  dextrose 50% Injectable 12.5 Gram(s) IV Push once  dextrose 50% Injectable 25 Gram(s) IV Push once  dextrose 50% Injectable 25 Gram(s) IV Push once  enoxaparin Injectable 40 milliGRAM(s) SubCutaneous daily  piperacillin/tazobactam IVPB. 3.375 Gram(s) IV Intermittent every 8 hours  vancomycin  IVPB 1000 milliGRAM(s) IV Intermittent every 12 hours    MEDICATIONS  (PRN):  naproxen 500 milliGRAM(s) Oral every 12 hours PRN arthritis pain  dextrose Gel 1 Dose(s) Oral once PRN Blood Glucose LESS THAN 70 milliGRAM(s)/deciliter  glucagon  Injectable 1 milliGRAM(s) IntraMuscular once PRN Glucose LESS THAN 70 milligrams/deciliter    T(C): 36.7 (09-28-17 @ 05:42), Max: 36.8 (09-27-17 @ 15:00)  HR: 57 (09-28-17 @ 05:42) (57 - 63)  BP: 134/63 (09-28-17 @ 05:42) (113/58 - 134/63)  RR: 18 (09-28-17 @ 05:42) (18 - 18)  SpO2: 99% (09-28-17 @ 05:42) (98% - 99%)    CAPILLARY BLOOD GLUCOSE  132 (28 Sep 2017 12:57)  177 (28 Sep 2017 09:08)  138 (27 Sep 2017 21:35)  81 (27 Sep 2017 18:55)    PHYSICAL EXAM:  GENERAL: NAD, well-developed  CHEST/LUNG: Clear to auscultation bilaterally; No wheeze  HEART: Regular rate and rhythm; No murmurs, rubs, or gallops  ABDOMEN: Soft, Nontender, Nondistended; Bowel sounds present  EXTREMITIES:   R foot dressing c/d/i  PSYCH: AAOx3  NEUROLOGY: non-focal    LABS:                        11.4   4.98  )-----------( 182      ( 28 Sep 2017 06:28 )             33.2     09-28    140  |  103  |  23  ----------------------------<  144<H>  4.1   |  24  |  1.09    Ca    9.1      28 Sep 2017 06:28    TPro  7.3  /  Alb  4.6  /  TBili  0.5  /  DBili  x   /  AST  22  /  ALT  22  /  AlkPhos  110  09-26      Microbiology:   VBG 09-26 @ 17:39  pH: 7.29/pCO2: 58/pO2: 27/HCO3: 23/lactate: 1.3        Consultant(s) Notes Reviewed:  pod, ID    Care Discussed with Consultants/Other Providers:

## 2017-09-28 NOTE — PROGRESS NOTE ADULT - PROBLEM SELECTOR PLAN 1
Due to Rt foot sub 2nd met head ulcer to capsule w/ cellulitis; OM r/o on MRI however with possible collection, podiatry has no plan for surgery, does not feel there is a collection   -c/w vanc, zosyn  -f/u ID recs (?failed clinda/cipro vs recurrent infxn)  -fall precautions  -will need PT eval, ?specialized shoe Due to Rt foot sub 2nd met head ulcer  w/ cellulitis; OM r/o on MRI however with possible collection, podiatry has no plan for surgery, does not feel there is a collection   -c/w vanc, zosyn  -f/u ID recs (?failed clinda/cipro vs recurrent infxn)  -fall precautions  -will need PT eval, ?specialized shoe Due to Rt foot sub 2nd met head ulcer  w/ cellulitis; OM r/o on MRI however with possible collection, podiatry has no plan for surgery, does not feel there is a collection; EDYAP 8/17/17  -c/w vanc, zosyn  -f/u ID recs (?failed clinda/cipro vs recurrent infxn)  -fall precautions  -will need PT eval, ?specialized shoe Due to Rt foot sub 2nd met head ulcer  w/ cellulitis; OM r/o on MRI however with possible collection, podiatry has no plan for surgery, does not feel there is a collection on exam; TDAP 8/17/17  -c/w ervin, zosyn, D2---vanco trough 17  -f/u ID recs (?failed clinda/cipro vs recurrent infxn)  -fall precautions  -will need PT eval, ?specialized shoe

## 2017-09-28 NOTE — PROGRESS NOTE ADULT - ASSESSMENT
72 year old female with Hx significant for HTN, Type 2 DM and hypothyroidism, recent admission for right foot cellulitis s/p course of PO abx (clinda/cipro x 10d) now a/w recurrent R foot cellulitis in context of DM peripheral neuropathy c/b unstable gait due to pain referred from podiatry office 72 year old female with Hx significant for HTN, Type 2 DM and hypothyroidism, recent admission for right foot cellulitis s/p course of PO abx (clinda/cipro x 10d) now a/w recurrent R foot cellulitis (states tack in shoe) in context of DM peripheral neuropathy c/b unstable gait due to pain referred from podiatry office

## 2017-09-28 NOTE — PROGRESS NOTE ADULT - PROBLEM SELECTOR PLAN 3
-FS qac and hs w/ SSI  -resume orals on dc HbA1c = 6.2% in August, no need to repeat; patient with neuropathy of feet  -FS qac and hs w/ SSI  -resume orals on dc

## 2017-09-28 NOTE — PROGRESS NOTE ADULT - SUBJECTIVE AND OBJECTIVE BOX
ID CONSULT Attending Addendum    I am unable to modify Resident;s consult note    Assess:  recurrent puncture wound from the same tack through the same sneaker - Patient voices commitment to be mindful of feet. Classically, Pseudomonas aeruginosa has complicted puncture injuries through sneakers. Pt had Tetanus booster last admission.    Suggest:  COntinue Zosyn, Vanco  we will follow cultures  Check Hgb A1C  WOund care  Podiatry follow up

## 2017-09-28 NOTE — CONSULT NOTE ADULT - SUBJECTIVE AND OBJECTIVE BOX
Patient is a 72y old  Female who presents with a chief complaint of Right foot swelling, pain (26 Sep 2017 23:02)    HPI:  72 year old female with Hx significant for HTN, Type 2 DM and hypothyroidism who is c/o right foot swelling and redness x 4 days. Last hospital admission: 8/15 for right foot cellulitis secondary to puncture wound due to diabetic neuropathy, MRI showed no evidence of osteomyelitis or bone involvement, was discharged home on a 10 day course of oral antibiotics (Cipro/Clindamyci). Pt completed abx w/ resolution of infection. At present the pt states that  for the past 4 days has developed gradual onset, worsening recurrence of right foot redness, warmth, and pain, in same location of previous infection.   Pt states she saw her podiatrist, Dr. Troy Mooney today, who directed pt to [facility] ED for evaluation due to possible diabetic abscess. Per pt, purulunet drainage was noted when foot probed as an outpt. Reports she has "felt off" past few days, w/ decreased appetite and oral intake. Reports no fevers, CP, SOB, N/V/D/C, dysuria, change in strength/weakness, melena.     ED course: Vancomycin x 1 dose IV, Zosyn x 1 dose IV. Evaluated by podiatry sx; (26 Sep 2017 23:02)      PAST MEDICAL & SURGICAL HISTORY:  Type 2 diabetes mellitus: type 2  Hypothyroidism: Adult hypothyroidism  Arthropathy: Arthritis  Essential hypertension: HTN (hypertension)  Abdominal swelling: Pelvic mass  Obesity  Elective surgery for purposes other than treating health conditions: Gall Bladder removal  Other postprocedural status: S/P appendectomy  Elective surgery for purposes other than treating health conditions: Uterine Biospy  S/P Appendectomy      Social history:    FAMILY HISTORY:  No pertinent family history in first degree relatives    REVIEW OF SYSTEMS  General:	Denies any malaise fatigue or chills. Fevers absent    Skin:No rash  	  Ophthalmologic:Denies any visual complaints,discharge redness or photophobia  	  ENMT:No nasal discharge,headache,sinus congestion or throat pain.No dental complaints    Respiratory and Thorax:No cough,sputum or chest pain.Denies shortness of breath  	  Cardiovascular:	No chest pain,palpitaions or dizziness    Gastrointestinal:	NO nausea,abdominal pain or diarrhea.    Genitourinary:	No dysuria,frequency. No flank pain    Musculoskeletal:	No joint swelling or pain.No weakness    Neurological:No confusion,diziness.No extremity weakness.No bladder or bowel incontinence	    Psychiatric:No delusions or hallucinations	    Hematology/Lymphatics:	No LN swelling.No gum bleeding     Endocrine:	No recent weight gain or loss.No abnormal heat/cold intolerance    Allergic/Immunologic:	No hives or rash   Allergies    No Known Allergies    Intolerances        Antimicrobials:    piperacillin/tazobactam IVPB. 3.375 Gram(s) IV Intermittent every 8 hours  vancomycin  IVPB 1000 milliGRAM(s) IV Intermittent every 12 hours        Vital Signs Last 24 Hrs  T(C): 36.7 (28 Sep 2017 05:42), Max: 36.8 (27 Sep 2017 15:00)  T(F): 98.1 (28 Sep 2017 05:42), Max: 98.3 (27 Sep 2017 15:00)  HR: 57 (28 Sep 2017 05:42) (57 - 63)  BP: 134/63 (28 Sep 2017 05:42) (113/58 - 134/63)  BP(mean): --  RR: 18 (28 Sep 2017 05:42) (18 - 18)  SpO2: 99% (28 Sep 2017 05:42) (98% - 99%)    PHYSICAL EXAM:Pleasant patient in no acute distress.      Constitutional:Comfortable.Awake and alert  No cachexia     Eyes:PERRL EOMI.NO discharge or conjunctival injection    ENMT:No sinus tenderness.No thrush.No pharyngeal exudate or erythema.Fair dental hygiene    Neck:Supple,No LN,no JVD      Respiratory:Good air entry bilaterally,CTA    Cardiovascular:S1 S2 wnl, No murmurs,rub or gallops    Gastrointestinal:Soft BS(+) no tenderness no masses ,No rebound or guarding    Genitourinary:No CVA tendereness     Rectal:    Extremities:No cyanosis,clubbing or edema.    Vascular:peripheral pulses felt    Neurological:AAO X 3,No grossly focal deficits    Skin:No rash     Lymph Nodes:No palpable LNs    Musculoskeletal:No joint swelling or LOM    Psychiatric:Affect normal.                                11.4   4.98  )-----------( 182      ( 28 Sep 2017 06:28 )             33.2         09-28    140  |  103  |  23  ----------------------------<  144<H>  4.1   |  24  |  1.09    Ca    9.1      28 Sep 2017 06:28    TPro  7.3  /  Alb  4.6  /  TBili  0.5  /  DBili  x   /  AST  22  /  ALT  22  /  AlkPhos  110  09-26      MICROBIOLOGY/RECENT CULTURES:  BCx (9/26): NGTD  Wound culture from right foot (9/26): results pending          Radiology:  X-ray Rt. foot (9/26):  IMPRESSION:    Ulcer at the plantar aspect of the distal lateral forefoot. No radiographic  evidence of osteomyelitis. If there is further clinical concern for  osteomyelitis, MRI of the foot is recommended.    X-ray Rt. ankle (9/26):   IMPRESSION:    Ulcer at the plantar aspect of the distal lateral forefoot. No radiographic  evidence of osteomyelitis. If there is further clinical concern for  osteomyelitis, MRI of the foot is recommended.    MRI Rt. foot (9/27):  IMPRESSION:  Plantar forefoot soft tissue ulceration over 2nd MTP region.    Generalized cellulitic change. Tiny circumscribed rim-enhancing fluid  collection in the plantar subcutaneous tissues between the 2nd and 3rd  metatarsal heads deep to the ulceration bed (series 12 image 12; series 11  image 25; and series 13 image 16) . No additional suspected collections.    No MR evidence for osteomyelitis.          Assessment:        Recommendations and Plan: Patient is a 72y old  Female who presents with a chief complaint of Right foot swelling, pain (26 Sep 2017 23:02)    HPI:  72 year old female with Hx significant for HTN, Type 2 DM and hypothyroidism who is c/o right foot swelling and redness x 4 days PTA. Last hospital admission: 8/15 for right foot cellulitis secondary to puncture wound due to diabetic neuropathy, MRI showed no evidence of osteomyelitis or bone involvement, BCx grew strep B, was discharged home on a 10 day course of oral antibiotics (Cipro/Clindamyci). Pt completed 9 out of 10 days of abx w/ resolution of infection. At present the pt states that for 4 days PTA she has developed gradual onset, worsening recurrence of right foot redness, warmth, and pain, in same location of previous infection.  Of note, pt. found a tac in her foot after putting her sneaker on before recurrence of symptoms. It was found within the same site of prior puncture wound. She believes this tac may have caused the initial puncture wound prior to first admission. Pt states she saw her podiatrist, Dr. Troy Mooney on day of admission who directed her to [facility] ED for evaluation due to possible diabetic abscess. Per pt, purulunt drainage was noted when foot probed as an outpt. Reports she has "felt off" past few days, w/ decreased appetite and oral intake. Reports no fevers, CP, SOB, N/V/D/C, dysuria, change in strength/weakness, melena.     ED course: Vancomycin x 1 dose IV, Zosyn x 1 dose IV. Evaluated by podiatry sx; (26 Sep 2017 23:02)      PAST MEDICAL & SURGICAL HISTORY:  Type 2 diabetes mellitus: type 2  Hypothyroidism: Adult hypothyroidism  Arthropathy: Arthritis  Essential hypertension: HTN (hypertension)  Abdominal swelling: Pelvic mass  Obesity  Elective surgery for purposes other than treating health conditions: Gall Bladder removal  Other postprocedural status: S/P appendectomy  Elective surgery for purposes other than treating health conditions: Uterine Biospy  S/P Appendectomy      Social history:    FAMILY HISTORY:  No pertinent family history in first degree relatives    REVIEW OF SYSTEMS  General: Denies any malaise fatigue. Fevers absent  Ophthalmologic: Denies any visual complaints, discharge redness or photophobia  ENMT: No nasal discharge, headache, sinus congestion or throat pain. No dental complaints  Respiratory and Thorax: No cough, sputum or chest pain. Denies shortness of breath  Cardiovascular:	No chest pain, palpitations or dizziness  Gastrointestinal:	NO nausea ,abdominal pain or diarrhea.  Genitourinary:	No dysuria, frequency. No flank pain  Musculoskeletal:	No joint swelling, No weakness  Neurological: No confusion, dizziness. No extremity weakness. No bladder or bowel incontinence	  Psychiatric: No delusions or hallucinations	  Hematology/Lymphatics:	No LN swelling. No gum bleeding   Endocrine: No recent weight gain or loss. No abnormal heat/cold intolerance        Allergic/Immunologic:	  No hives or rash     Allergies  No Known Allergies    Intolerances        Antimicrobials:    piperacillin/tazobactam IVPB. 3.375 Gram(s) IV Intermittent every 8 hours  vancomycin  IVPB 1000 milliGRAM(s) IV Intermittent every 12 hours        Vital Signs Last 24 Hrs  T(C): 36.7 (28 Sep 2017 05:42), Max: 36.8 (27 Sep 2017 15:00)  T(F): 98.1 (28 Sep 2017 05:42), Max: 98.3 (27 Sep 2017 15:00)  HR: 57 (28 Sep 2017 05:42) (57 - 63)  BP: 134/63 (28 Sep 2017 05:42) (113/58 - 134/63)  BP(mean): --  RR: 18 (28 Sep 2017 05:42) (18 - 18)  SpO2: 99% (28 Sep 2017 05:42) (98% - 99%)    PHYSICAL EXAM: Pleasant patient in no acute distress.  Constitutional: Comfortable. Awake and alert  No cachexia   Eyes: EOMI. NO discharge or conjunctival injection  ENMT: No sinus tenderness. No thrush. No pharyngeal exudate or erythema. Fair dental hygiene  Neck: Supple, No LN, no JVD  Respiratory: Good air entry bilaterally, CTA  Cardiovascular:S1 S2 wnl, No murmurs, rub or gallops  Gastrointestinal: Soft BS(+) no tenderness no masses ,No rebound or guarding  Genitourinary: No CVA tenderness   Rectal: not performed  Extremities: No cyanosis ,clubbing or edema  Vascular: peripheral pulses felt  Neurological: AAO X 3,No grossly focal deficits  Skin: Deep plantar puncture wound beneath right 2nd metatarsal, no purulent discharge, surrounding erythema or edema, No rash   Lymph Nodes: No palpable LNs  Musculoskeletal: No joint swelling or LOM  Psychiatric: Affect normal.                                11.4   4.98  )-----------( 182      ( 28 Sep 2017 06:28 )             33.2         09-28    140  |  103  |  23  ----------------------------<  144<H>  4.1   |  24  |  1.09    Ca    9.1      28 Sep 2017 06:28    TPro  7.3  /  Alb  4.6  /  TBili  0.5  /  DBili  x   /  AST  22  /  ALT  22  /  AlkPhos  110  09-26      MICROBIOLOGY/RECENT CULTURES:  BCx (9/26): NGTD  Wound culture from right foot (9/26): haemophilus parainfluenzae          Radiology:  X-ray Rt. foot (9/26):  IMPRESSION:    Ulcer at the plantar aspect of the distal lateral forefoot. No radiographic  evidence of osteomyelitis. If there is further clinical concern for  osteomyelitis, MRI of the foot is recommended.    X-ray Rt. ankle (9/26):   IMPRESSION:    Ulcer at the plantar aspect of the distal lateral forefoot. No radiographic  evidence of osteomyelitis. If there is further clinical concern for  osteomyelitis, MRI of the foot is recommended.    MRI Rt. foot (9/27):  IMPRESSION:  Plantar forefoot soft tissue ulceration over 2nd MTP region.    Generalized cellulitic change. Tiny circumscribed rim-enhancing fluid  collection in the plantar subcutaneous tissues between the 2nd and 3rd  metatarsal heads deep to the ulceration bed (series 12 image 12; series 11  image 25; and series 13 image 16) . No additional suspected collections.    No MR evidence for osteomyelitis.

## 2017-09-29 ENCOUNTER — TRANSCRIPTION ENCOUNTER (OUTPATIENT)
Age: 72
End: 2017-09-29

## 2017-09-29 LAB — BACTERIA WND CULT: SIGNIFICANT CHANGE UP

## 2017-09-29 PROCEDURE — 99233 SBSQ HOSP IP/OBS HIGH 50: CPT

## 2017-09-29 RX ADMIN — PIPERACILLIN AND TAZOBACTAM 25 GRAM(S): 4; .5 INJECTION, POWDER, LYOPHILIZED, FOR SOLUTION INTRAVENOUS at 21:45

## 2017-09-29 RX ADMIN — Medication 250 MILLIGRAM(S): at 10:08

## 2017-09-29 RX ADMIN — Medication 12.5 MILLIGRAM(S): at 06:08

## 2017-09-29 RX ADMIN — Medication 250 MILLIGRAM(S): at 10:13

## 2017-09-29 RX ADMIN — Medication 250 MILLIGRAM(S): at 21:45

## 2017-09-29 RX ADMIN — LOSARTAN POTASSIUM 100 MILLIGRAM(S): 100 TABLET, FILM COATED ORAL at 06:08

## 2017-09-29 RX ADMIN — ENOXAPARIN SODIUM 40 MILLIGRAM(S): 100 INJECTION SUBCUTANEOUS at 13:58

## 2017-09-29 RX ADMIN — Medication 50 MICROGRAM(S): at 06:08

## 2017-09-29 RX ADMIN — PIPERACILLIN AND TAZOBACTAM 25 GRAM(S): 4; .5 INJECTION, POWDER, LYOPHILIZED, FOR SOLUTION INTRAVENOUS at 13:58

## 2017-09-29 RX ADMIN — Medication 250 MILLIGRAM(S): at 11:10

## 2017-09-29 RX ADMIN — Medication 1: at 18:10

## 2017-09-29 RX ADMIN — PIPERACILLIN AND TAZOBACTAM 25 GRAM(S): 4; .5 INJECTION, POWDER, LYOPHILIZED, FOR SOLUTION INTRAVENOUS at 06:08

## 2017-09-29 NOTE — PROGRESS NOTE ADULT - ASSESSMENT
RIGHT foot submet 2 ulcer UT3B (down to capsule/bone, infected, nonischemic) w/ cellulitis    - Pt seen and examined  - MRI negative for OM or abscess, tiny collection is not a concern  - Cont abx.  - Dressings changed.  Cont with local care.  No surgical plans at this time.  - awaiting final culture result and ID recs  - d/w attending

## 2017-09-29 NOTE — DISCHARGE NOTE ADULT - PATIENT PORTAL LINK FT
“You can access the [employer] Patient Portal, offered by [facility], by registering with the following website: [email or web address]”

## 2017-09-29 NOTE — PROGRESS NOTE ADULT - ASSESSMENT
puncture wound to foot growing H para flu beta lactamase negative    Suggest continue Zosyn/Vanco while awaiting final culture results-  -if no other pathogens identified can focus antibiotics to Unasyn on 10/2  Approximately 10 day total course antibiotics anticipated    Please call ID if needed this weekend.

## 2017-09-29 NOTE — DISCHARGE NOTE ADULT - HOSPITAL COURSE
72 year old female with Hx significant for HTN, Type 2 DM and hypothyroidism who is c/o right foot swelling and redness x 4 days. Last hospital admission: 8/15 for right foot cellulitis secondary to puncture wound due to diabetic neuropathy, MRI showed no evidence of osteomyelitis or bone involvement, was discharged home on a 10 day course of oral antibiotics (Cipro/Clindamyci). Pt completed abx w/ resolution of infection. At present the pt states that  for the past 4 days has developed gradual onset, worsening recurrence of right foot redness, warmth, and pain, in same location of previous infection. Pt was admitted for IV abx therapy. Podiatry saw patient and incised the ulcer but not drainable collections were found - wound culture grew H.parainfluenzae. Repeat MRI was done showing no evidence of osteomyelitis, but a possible overlying rim-enhancing fluid collection in the plantar subcutaneous tissues between the 2nd and 3rd metatarsal heads was noted - podiatry evaluated patient with no recommendation for intervention. Pt was initially treated with IV Vanco/Zosyn, then switched to IV Unasyn and ultimately discharge on PO Augmentin for total 10 days of abx therapy.  Pt was advised to f/u with podiatry within 1 week of discharge.

## 2017-09-29 NOTE — PROGRESS NOTE ADULT - SUBJECTIVE AND OBJECTIVE BOX
Follow Up:      Interval History/ROS: better - notes some discomfort left hip    Allergies  No Known Allergies    ANTIMICROBIALS:  piperacillin/tazobactam IVPB. 3.375 every 8 hours  vancomycin  IVPB 1000 every 12 hours      OTHER MEDS:  MEDICATIONS  (STANDING):  levothyroxine 50 daily  hydrochlorothiazide 12.5 daily  losartan 100 daily  insulin lispro (HumaLOG) corrective regimen sliding scale  three times a day before meals  insulin lispro (HumaLOG) corrective regimen sliding scale  at bedtime  dextrose Gel 1 once PRN  dextrose 50% Injectable 12.5 once  dextrose 50% Injectable 25 once  dextrose 50% Injectable 25 once  glucagon  Injectable 1 once PRN  enoxaparin Injectable 40 daily  naproxen 250 every 8 hours PRN      Vital Signs Last 24 Hrs  T(C): 36.6 (29 Sep 2017 06:03), Max: 36.7 (28 Sep 2017 20:34)  T(F): 97.8 (29 Sep 2017 06:03), Max: 983 (28 Sep 2017 14:32)  HR: 57 (29 Sep 2017 06:03) (57 - 60)  BP: 162/69 (29 Sep 2017 06:03) (111/75 - 162/69)  BP(mean): --  RR: 18 (29 Sep 2017 06:03) (18 - 18)  SpO2: 98% (29 Sep 2017 06:03) (96% - 100%)    PHYSICAL EXAM:  General: WN/WD NAD, Non-toxic  Neurology: A&Ox3, nonfocal  Respiratory: Clear to auscultation bilaterally  CV: RRR, S1S2, no murmurs, rubs or gallops  Abdominal: Soft, Non-tender, non-distended, normal bowel sounds  Extremities: No edema, no purulence expressible from left plantar wound  Line Sites: Clear  Skin: No rash                11.4   4.98  )-----------( 182      ( 28 Sep 2017 06:28 )             33.2       09-28    140  |  103  |  23  ----------------------------<  144<H>  4.1   |  24  |  1.09    Ca    9.1      28 Sep 2017 06:28    Hemoglobin A1C, Whole Blood (08.16.17 @ 06:45)    Hemoglobin A1C, Whole Blood: 6.2    MICROBIOLOGY:  Culture - Blood (09.26.17 @ 22:09)    Culture - Blood:   NO ORGANISMS ISOLATED  NO ORGANISMS ISOLATED AT 48 HRS.    Specimen Source: BLOOD VENOUS    Culture - Wound with Gram Stain (09.26.17 @ 20:02)    Culture - Wound with Gram Stain:   H.PARAINFLUENZAE=BETA LACTAMASE=NEGATIVE  HPARA^Haemophilus parainfluenzae    Specimen Source: OTHER    Culture - Blood (09.26.17 @ 18:40)    Culture - Blood:   NO ORGANISMS ISOLATED  NO ORGANISMS ISOLATED AT 48 HRS.    Specimen Source: BLOOD PERIPHERAL    Culture - Wound with Gram Stain (08.15.17 @ 20:07)    Culture - Wound with Gram Stain:   MODRATE  Routine susceptibility testing not performed as  Streptococcus Grp A/B is susceptible to drug(s) of choice -  Penicillin and Ampicillin  STRAG^Streptococcus agalactiae Gp B  STRVI^Streptococcus Viridans Group    Specimen Source: OTHER      RADIOLOGY:  < from: MRI Foot w/wo Cont, Right (09.27.17 @ 20:19) >  IMPRESSION:  Plantar forefoot soft tissue ulceration over 2nd MTP region.    Generalized cellulitic change. Tiny circumscribed rim-enhancing fluid   collection in the plantar subcutaneous tissues between the 2nd and 3rd   metatarsal heads deep to the ulceration bed(series 12 image 12; series   11 image 25; and series 13 image 16) . No additional suspected   collections.    No MR evidence for osteomyelitis.    < end of copied text >      Taras Ying MD; Division of Infectious Disease; Pager: [phone]; nights and weekends: [phone]

## 2017-09-29 NOTE — PHYSICAL THERAPY INITIAL EVALUATION ADULT - PERTINENT HX OF CURRENT PROBLEM, REHAB EVAL
Pt is a 72 year old female with Right foot swelling and redness. Cellulitis of right lower extremity

## 2017-09-29 NOTE — PROGRESS NOTE ADULT - ASSESSMENT
71 yo F with HTN, Type 2 DM and hypothyroidism, recent admission for right foot cellulitis s/p course of PO abx (clinda/cipro x 10d) now a/w recurrent R foot cellulitis (states tack in shoe) in context of DM peripheral neuropathy c/b unstable gait due to pain referred from podiatry office

## 2017-09-29 NOTE — PROGRESS NOTE ADULT - SUBJECTIVE AND OBJECTIVE BOX
Patient is a 72y old  Female who presents with a chief complaint of Right foot swelling, pain (26 Sep 2017 23:02)       INTERVAL HPI/OVERNIGHT EVENTS:  Patient seen and evaluated at bedside.  Pt is resting comfortable in NAD. Denies N/V/F/C.     Allergies    No Known Allergies    Intolerances        Vital Signs Last 24 Hrs  T(C): 36.6 (29 Sep 2017 06:03), Max: 36.7 (28 Sep 2017 20:34)  T(F): 97.8 (29 Sep 2017 06:03), Max: 983 (28 Sep 2017 14:32)  HR: 57 (29 Sep 2017 06:03) (57 - 60)  BP: 162/69 (29 Sep 2017 06:03) (111/75 - 162/69)  BP(mean): --  RR: 18 (29 Sep 2017 06:03) (18 - 18)  SpO2: 98% (29 Sep 2017 06:03) (96% - 100%)    LABS:                        11.4   4.98  )-----------( 182      ( 28 Sep 2017 06:28 )             33.2     09-28    140  |  103  |  23  ----------------------------<  144<H>  4.1   |  24  |  1.09    Ca    9.1      28 Sep 2017 06:28          CAPILLARY BLOOD GLUCOSE  124 (29 Sep 2017 07:35)  172 (28 Sep 2017 21:26)  168 (28 Sep 2017 17:53)  132 (28 Sep 2017 12:57)          Lower Extremity Physical Exam:  Vasular: DP/PT 2/4, B/L, Temperature gradient WNL, B/L.   Neuro: Epicritic sensation intact to the level of the digits, B/L.  Musculoskeletal/Ortho: No gross pedal deformities noted, B/L.  Skin:  Wound #1:   Location: Sub 2nd met head R foot  Size: Aprox 0.5 cm x 0.5 cm  Depth: to capsule  Wound bed: granular  Drainage: none  Odor: none  Periwound: Edematous and erythematous, mild crepitus noted upon ROM of 2nd met, fluctuance in 1st interspace

## 2017-09-29 NOTE — DISCHARGE NOTE ADULT - CARE PLAN
Goal:	wound healing  Instructions for follow-up, activity and diet:	- weight bearing as tolerated  - daily dressing changes with betadine, dry 4x4 gauze, and kerlix  - take full antibiotics course as prescribed   - follow up with Dr. Sam within one week of discharge (call  to make an appointment) Principal Discharge DX:	Cellulitis of foot, right  Goal:	wound healing  Instructions for follow-up, activity and diet:	Please follow up with your pcp within 1 week of discharge.  Please call to make an appointment.    Please clean with betadine and apply gauzed.  please change daily and PRN   - weight bearing as tolerated  - take full antibiotics course as prescribed   - follow up with Dr. Sam within one week of discharge (call  to make an appointment) Please call to make an appointment within 1 week of discharge.    -pt should follow up in the wound care center upon discharge  [phone]  Secondary Diagnosis:	Essential hypertension  Goal:	maintain adequate blood pressure control  Instructions for follow-up, activity and diet:	Please check your blood pressure prior to taking your blood pressure medications.  Please follow up with your pcp within 1 week of discharge. Please call to make an appointment.  Secondary Diagnosis:	Hypothyroidism  Goal:	continue  current regimen  Instructions for follow-up, activity and diet:	Please follow up with your pcp within 1 week of discharge. Please call to make an appointment.  Secondary Diagnosis:	Type 2 diabetes mellitus  Goal:	maintain adequate glucose control  Instructions for follow-up, activity and diet:	Please monitor your fingersticks.  Please follow up with your pcp within 1 week of discharge. Please call to make an appointment.  Secondary Diagnosis:	Hypernatremia  Goal:	resolved

## 2017-09-29 NOTE — PROGRESS NOTE ADULT - PROBLEM SELECTOR PLAN 3
HbA1c = 6.2% in August, no need to repeat; patient with neuropathy of feet  -FS qac and hs w/ SSI  -resume orals on dc

## 2017-09-29 NOTE — DISCHARGE NOTE ADULT - CARE PROVIDER_API CALL
Trey Sam (DPM), Surgery  [address] [place] [address]  Phone: [phone]  Fax: [phone]

## 2017-09-29 NOTE — DISCHARGE NOTE ADULT - PLAN OF CARE
wound healing - weight bearing as tolerated  - daily dressing changes with betadine, dry 4x4 gauze, and kerlix  - take full antibiotics course as prescribed   - follow up with Dr. Sam within one week of discharge (call  to make an appointment) Please follow up with your pcp within 1 week of discharge.  Please call to make an appointment.    Please clean with betadine and apply gauzed.  please change daily and PRN   - weight bearing as tolerated  - take full antibiotics course as prescribed   - follow up with Dr. Sam within one week of discharge (call  to make an appointment) Please call to make an appointment within 1 week of discharge.    -pt should follow up in the wound care center upon discharge  [phone] maintain adequate blood pressure control Please check your blood pressure prior to taking your blood pressure medications.  Please follow up with your pcp within 1 week of discharge. Please call to make an appointment. continue  current regimen Please follow up with your pcp within 1 week of discharge. Please call to make an appointment. maintain adequate glucose control Please monitor your fingersticks.  Please follow up with your pcp within 1 week of discharge. Please call to make an appointment. resolved

## 2017-09-29 NOTE — PROGRESS NOTE ADULT - SUBJECTIVE AND OBJECTIVE BOX
Patient is a 72y old  Female who presents with a chief complaint of Right foot swelling, pain    SUBJECTIVE / OVERNIGHT EVENTS:    Patient reports feeling better, foot pain less severe, has hard bottom shoe now that makes ambulation easier  No fever, chill, CP, or SOB  Tolerating diet, no diarrhea on abx    MEDICATIONS  (STANDING):  levothyroxine 50 MICROGram(s) Oral daily  ergocalciferol 36124 Unit(s) Oral <User Schedule>  hydrochlorothiazide 12.5 milliGRAM(s) Oral daily  losartan 100 milliGRAM(s) Oral daily  insulin lispro (HumaLOG) corrective regimen sliding scale   SubCutaneous three times a day before meals  insulin lispro (HumaLOG) corrective regimen sliding scale   SubCutaneous at bedtime  dextrose 5%. 1000 milliLiter(s) (50 mL/Hr) IV Continuous <Continuous>  dextrose 50% Injectable 12.5 Gram(s) IV Push once  dextrose 50% Injectable 25 Gram(s) IV Push once  dextrose 50% Injectable 25 Gram(s) IV Push once  enoxaparin Injectable 40 milliGRAM(s) SubCutaneous daily  piperacillin/tazobactam IVPB. 3.375 Gram(s) IV Intermittent every 8 hours  vancomycin  IVPB 1000 milliGRAM(s) IV Intermittent every 12 hours    MEDICATIONS  (PRN):  dextrose Gel 1 Dose(s) Oral once PRN Blood Glucose LESS THAN 70 milliGRAM(s)/deciliter  glucagon  Injectable 1 milliGRAM(s) IntraMuscular once PRN Glucose LESS THAN 70 milligrams/deciliter  naproxen 250 milliGRAM(s) Oral every 8 hours PRN arthritic pain    T(C): 36.6 (09-29-17 @ 06:03), Max: 36.7 (09-28-17 @ 20:34)  HR: 57 (09-29-17 @ 06:03) (57 - 60)  BP: 162/69 (09-29-17 @ 06:03) (111/75 - 162/69)  RR: 18 (09-29-17 @ 06:03) (18 - 18)  SpO2: 98% (09-29-17 @ 06:03) (96% - 100%)    CAPILLARY BLOOD GLUCOSE  124 (29 Sep 2017 07:35)  172 (28 Sep 2017 21:26)  168 (28 Sep 2017 17:53)  132 (28 Sep 2017 12:57)    PHYSICAL EXAM:  GENERAL: NAD, well-developed  CHEST/LUNG: Clear to auscultation bilaterally; No wheeze  HEART: Regular rate and rhythm; No murmurs, rubs, or gallops  ABDOMEN: Soft, Nontender, Nondistended; Bowel sounds present  EXTREMITIES:   R foot dressing c/d/i  PSYCH: AAOx3  NEUROLOGY: non-focal    LABS:                        11.4   4.98  )-----------( 182      ( 28 Sep 2017 06:28 )             33.2     09-28    140  |  103  |  23  ----------------------------<  144<H>  4.1   |  24  |  1.09    Ca    9.1      28 Sep 2017 06:2        Microbiology:     Foot wound Cx: H parainfluenza     Consultant(s) Notes Reviewed:  podiatry, ID    Care Discussed with Consultants/Other Providers: podiatry, ID

## 2017-09-29 NOTE — PROGRESS NOTE ADULT - PROBLEM SELECTOR PLAN 1
Due to R foot sub 2nd met head ulcer w/ cellulitis; OM r/o on MRI however with possible collection, podiatry has no plan for surgery, does not feel there is a collection on exam; TDAP 8/17/17  -c/w john mueller, D3, f/u ID recs whether PO alternative   -fall precautions  -has hard bottom shoe  -per podiatry c/w betadine & dry dressing, OP podiatry f/u Due to R foot sub 2nd met head ulcer w/ cellulitis; OM r/o on MRI however with possible collection, podiatry has no plan for surgery, does not feel there is a collection on exam; TDAP 8/17/17  -c/w vanc, zosyn, D3, f/u ID recs whether PO alternative (keflex + cipro?(no documented pseudomonas or MRSA))  -fall precautions  -has hard bottom shoe  -per podiatry c/w betadine & dry dressing, OP podiatry f/u

## 2017-09-29 NOTE — DISCHARGE NOTE ADULT - MEDICATION SUMMARY - MEDICATIONS TO TAKE
I will START or STAY ON the medications listed below when I get home from the hospital:    naproxen 500 mg oral tablet  -- 1 tab(s) by mouth 2 times a day, As Needed  -- Indication: For Pain    Jentadueto 2.5 mg-1000 mg oral tablet  -- 1 tab(s) by mouth 2 times a day  -- Indication: For diabetes mellitus    glimepiride 2 mg oral tablet  -- 1 tab(s) by mouth once a day (in the morning)  -- Indication: For diabetes mellitus     losartan-hydrochlorothiazide 100mg-12.5mg oral tablet  -- 1 tab(s) by mouth once a day  -- Indication: For Htn     Augmentin 875 mg-125 mg oral tablet  -- 875 milligram(s) by mouth every 12 hours   -- Finish all this medication unless otherwise directed by prescriber.  Take with food or milk.    -- Indication: For Cellulitis of foot, right    lactobacillus acidophilus oral capsule  -- 1 cap(s) by mouth 3 times a day (with meals)   -- Indication: For Prophylactic use of low molecular weight heparin for venous thromboembolism    levothyroxine 50 mcg (0.05 mg) oral tablet  -- 1 tab(s) by mouth once a day  -- Indication: For Hypothyroidism, unspecified type    Vitamin D2 50,000 intl units (1.25 mg) oral capsule  -- 1 cap(s) by mouth once on Mondays  -- Indication: For supplement

## 2017-09-30 LAB
BUN SERPL-MCNC: 24 MG/DL — HIGH (ref 7–23)
CALCIUM SERPL-MCNC: 10 MG/DL — SIGNIFICANT CHANGE UP (ref 8.4–10.5)
CHLORIDE SERPL-SCNC: 101 MMOL/L — SIGNIFICANT CHANGE UP (ref 98–107)
CO2 SERPL-SCNC: 23 MMOL/L — SIGNIFICANT CHANGE UP (ref 22–31)
CREAT SERPL-MCNC: 1.09 MG/DL — SIGNIFICANT CHANGE UP (ref 0.5–1.3)
GLUCOSE SERPL-MCNC: 140 MG/DL — HIGH (ref 70–99)
HCT VFR BLD CALC: 38 % — SIGNIFICANT CHANGE UP (ref 34.5–45)
HGB BLD-MCNC: 13 G/DL — SIGNIFICANT CHANGE UP (ref 11.5–15.5)
MCHC RBC-ENTMCNC: 31.4 PG — SIGNIFICANT CHANGE UP (ref 27–34)
MCHC RBC-ENTMCNC: 34.2 % — SIGNIFICANT CHANGE UP (ref 32–36)
MCV RBC AUTO: 91.8 FL — SIGNIFICANT CHANGE UP (ref 80–100)
NRBC # FLD: 0 — SIGNIFICANT CHANGE UP
PLATELET # BLD AUTO: 241 K/UL — SIGNIFICANT CHANGE UP (ref 150–400)
PMV BLD: 10.1 FL — SIGNIFICANT CHANGE UP (ref 7–13)
POTASSIUM SERPL-MCNC: 4.2 MMOL/L — SIGNIFICANT CHANGE UP (ref 3.5–5.3)
POTASSIUM SERPL-SCNC: 4.2 MMOL/L — SIGNIFICANT CHANGE UP (ref 3.5–5.3)
RBC # BLD: 4.14 M/UL — SIGNIFICANT CHANGE UP (ref 3.8–5.2)
RBC # FLD: 12.4 % — SIGNIFICANT CHANGE UP (ref 10.3–14.5)
SODIUM SERPL-SCNC: 140 MMOL/L — SIGNIFICANT CHANGE UP (ref 135–145)
VANCOMYCIN TROUGH SERPL-MCNC: 16.2 UG/ML — SIGNIFICANT CHANGE UP (ref 10–20)
VANCOMYCIN TROUGH SERPL-MCNC: 26.8 UG/ML — CRITICAL HIGH (ref 10–20)
WBC # BLD: 5.56 K/UL — SIGNIFICANT CHANGE UP (ref 3.8–10.5)
WBC # FLD AUTO: 5.56 K/UL — SIGNIFICANT CHANGE UP (ref 3.8–10.5)

## 2017-09-30 PROCEDURE — 99233 SBSQ HOSP IP/OBS HIGH 50: CPT

## 2017-09-30 RX ORDER — LACTOBACILLUS ACIDOPHILUS 100MM CELL
1 CAPSULE ORAL EVERY 12 HOURS
Qty: 0 | Refills: 0 | Status: DISCONTINUED | OUTPATIENT
Start: 2017-09-30 | End: 2017-10-04

## 2017-09-30 RX ADMIN — Medication 250 MILLIGRAM(S): at 21:48

## 2017-09-30 RX ADMIN — Medication 1: at 18:07

## 2017-09-30 RX ADMIN — Medication 50 MICROGRAM(S): at 05:08

## 2017-09-30 RX ADMIN — PIPERACILLIN AND TAZOBACTAM 25 GRAM(S): 4; .5 INJECTION, POWDER, LYOPHILIZED, FOR SOLUTION INTRAVENOUS at 21:48

## 2017-09-30 RX ADMIN — ENOXAPARIN SODIUM 40 MILLIGRAM(S): 100 INJECTION SUBCUTANEOUS at 14:47

## 2017-09-30 RX ADMIN — PIPERACILLIN AND TAZOBACTAM 25 GRAM(S): 4; .5 INJECTION, POWDER, LYOPHILIZED, FOR SOLUTION INTRAVENOUS at 05:08

## 2017-09-30 RX ADMIN — Medication 12.5 MILLIGRAM(S): at 05:07

## 2017-09-30 RX ADMIN — LOSARTAN POTASSIUM 100 MILLIGRAM(S): 100 TABLET, FILM COATED ORAL at 05:07

## 2017-09-30 RX ADMIN — PIPERACILLIN AND TAZOBACTAM 25 GRAM(S): 4; .5 INJECTION, POWDER, LYOPHILIZED, FOR SOLUTION INTRAVENOUS at 14:44

## 2017-09-30 RX ADMIN — Medication 1 TABLET(S): at 18:07

## 2017-09-30 RX ADMIN — Medication 1: at 13:16

## 2017-09-30 NOTE — PROGRESS NOTE ADULT - SUBJECTIVE AND OBJECTIVE BOX
pt seen at bedside in NAD. dressing c/d/i to right foot  MRI questionable abscess between 2,3 no tenderness with palpation in this area and no probing to this area no erythema decreasing edema wound starting to close  applied betadine with DSD  continue dressing at home   ABX as per ID  pt should follow up in [facility] wound care center upon dsc [phone]

## 2017-09-30 NOTE — PROGRESS NOTE ADULT - SUBJECTIVE AND OBJECTIVE BOX
Patient is a 72y old  Female who presents with a chief complaint of Right foot swelling, pain (29 Sep 2017 12:49)      SUBJECTIVE / OVERNIGHT EVENTS: Pt doing well afebrile no N/V. states that the contralateral shoe slips off and is afraid to walk. all other review of systems neg    MEDICATIONS  (STANDING):  levothyroxine 50 MICROGram(s) Oral daily  ergocalciferol 82543 Unit(s) Oral <User Schedule>  hydrochlorothiazide 12.5 milliGRAM(s) Oral daily  losartan 100 milliGRAM(s) Oral daily  insulin lispro (HumaLOG) corrective regimen sliding scale   SubCutaneous three times a day before meals  insulin lispro (HumaLOG) corrective regimen sliding scale   SubCutaneous at bedtime  dextrose 5%. 1000 milliLiter(s) (50 mL/Hr) IV Continuous <Continuous>  dextrose 50% Injectable 12.5 Gram(s) IV Push once  dextrose 50% Injectable 25 Gram(s) IV Push once  dextrose 50% Injectable 25 Gram(s) IV Push once  enoxaparin Injectable 40 milliGRAM(s) SubCutaneous daily  piperacillin/tazobactam IVPB. 3.375 Gram(s) IV Intermittent every 8 hours  vancomycin  IVPB 1000 milliGRAM(s) IV Intermittent every 12 hours    MEDICATIONS  (PRN):  dextrose Gel 1 Dose(s) Oral once PRN Blood Glucose LESS THAN 70 milliGRAM(s)/deciliter  glucagon  Injectable 1 milliGRAM(s) IntraMuscular once PRN Glucose LESS THAN 70 milligrams/deciliter  naproxen 250 milliGRAM(s) Oral every 8 hours PRN arthritic pain        CAPILLARY BLOOD GLUCOSE  140 (30 Sep 2017 07:53)  211 (29 Sep 2017 21:37)  161 (29 Sep 2017 17:44)  134 (29 Sep 2017 13:09)        I&O's Summary      T(C): 36.6 (09-30-17 @ 04:54), Max: 37.2 (09-29-17 @ 15:38)  HR: 60 (09-30-17 @ 04:54) (60 - 65)  BP: 114/67 (09-30-17 @ 04:54) (114/67 - 131/52)  RR: 17 (09-30-17 @ 04:54) (17 - 19)  SpO2: 100% (09-30-17 @ 04:54) (95% - 100%)    PHYSICAL EXAM:  GENERAL: NAD, well-developed  CHEST/LUNG: Clear to auscultation bilaterally; No wheeze  HEART: Regular rate and rhythm; No murmurs, rubs, or gallops  ABDOMEN: Soft, Nontender, Nondistended; Bowel sounds present  EXTREMITIES:   R foot dressing c/d/i  PSYCH: AAOx3  NEUROLOGY: non-focal    LABS:      Culture - Blood:   NO ORGANISMS ISOLATED  NO ORGANISMS ISOLATED AT 72 HRS. (09.26.17 @ 22:09)    Culture - Wound with Gram Stain:   H.PARAINFLUENZAE=BETA LACTAMASE=NEGATIVE  *STCN ISOLATED FROM LIQUID MEDIA  HPARA^Haemophilus parainfluenzae  STCN^Staphylococcus sp.,coag neg (09.26.17 @ 20:02)    Culture - Blood:   NO ORGANISMS ISOLATED  NO ORGANISMS ISOLATED AT 72 HRS. (09.26.17 @ 18:40)                      RADIOLOGY & ADDITIONAL TESTS:    Imaging Personally Reviewed:    Consultant(s) Notes Reviewed:  ID/Podiatry    Care Discussed with Consultants/Other Providers:Podiatry

## 2017-09-30 NOTE — PROGRESS NOTE ADULT - PROBLEM SELECTOR PLAN 1
Due to R foot sub 2nd met head ulcer w/ cellulitis; OM r/o on MRI however with possible collection, podiatry has no plan for surgery, does not feel there is a collection on exam; TDAP 8/17/17  -c/w vanc, zosyn, D4 consider switch to unasyn after final cx  -fall precautions  -ill fitting contralateral shoe d/w podiatry will get smaller shoe  -per podiatry c/w betadine & dry dressing, OP podiatry f/u  -vanco trough prior to next dose

## 2017-09-30 NOTE — PROVIDER CONTACT NOTE (CRITICAL VALUE NOTIFICATION) - ACTION/TREATMENT ORDERED:
NP to assess chart and enter order. NP to assess chart and enter order.  per ADS NP Yolanda Tyson (re-contacted); HOLD 4th dose of vancomycin and re-draw level tonight prior to next dose.

## 2017-10-01 LAB
BACTERIA BLD CULT: SIGNIFICANT CHANGE UP
BACTERIA BLD CULT: SIGNIFICANT CHANGE UP
BUN SERPL-MCNC: 26 MG/DL — HIGH (ref 7–23)
CALCIUM SERPL-MCNC: 9.4 MG/DL — SIGNIFICANT CHANGE UP (ref 8.4–10.5)
CHLORIDE SERPL-SCNC: 103 MMOL/L — SIGNIFICANT CHANGE UP (ref 98–107)
CO2 SERPL-SCNC: 24 MMOL/L — SIGNIFICANT CHANGE UP (ref 22–31)
CREAT SERPL-MCNC: 1.03 MG/DL — SIGNIFICANT CHANGE UP (ref 0.5–1.3)
GLUCOSE SERPL-MCNC: 119 MG/DL — HIGH (ref 70–99)
HCT VFR BLD CALC: 32.1 % — LOW (ref 34.5–45)
HGB BLD-MCNC: 11.1 G/DL — LOW (ref 11.5–15.5)
MCHC RBC-ENTMCNC: 32 PG — SIGNIFICANT CHANGE UP (ref 27–34)
MCHC RBC-ENTMCNC: 34.6 % — SIGNIFICANT CHANGE UP (ref 32–36)
MCV RBC AUTO: 92.5 FL — SIGNIFICANT CHANGE UP (ref 80–100)
NRBC # FLD: 0 — SIGNIFICANT CHANGE UP
PLATELET # BLD AUTO: 187 K/UL — SIGNIFICANT CHANGE UP (ref 150–400)
PMV BLD: 10.5 FL — SIGNIFICANT CHANGE UP (ref 7–13)
POTASSIUM SERPL-MCNC: 4.1 MMOL/L — SIGNIFICANT CHANGE UP (ref 3.5–5.3)
POTASSIUM SERPL-SCNC: 4.1 MMOL/L — SIGNIFICANT CHANGE UP (ref 3.5–5.3)
RBC # BLD: 3.47 M/UL — LOW (ref 3.8–5.2)
RBC # FLD: 12.4 % — SIGNIFICANT CHANGE UP (ref 10.3–14.5)
SODIUM SERPL-SCNC: 142 MMOL/L — SIGNIFICANT CHANGE UP (ref 135–145)
WBC # BLD: 4.94 K/UL — SIGNIFICANT CHANGE UP (ref 3.8–10.5)
WBC # FLD AUTO: 4.94 K/UL — SIGNIFICANT CHANGE UP (ref 3.8–10.5)

## 2017-10-01 PROCEDURE — 99233 SBSQ HOSP IP/OBS HIGH 50: CPT

## 2017-10-01 PROCEDURE — G9001: CPT

## 2017-10-01 RX ORDER — VANCOMYCIN HCL 1 G
750 VIAL (EA) INTRAVENOUS EVERY 12 HOURS
Qty: 0 | Refills: 0 | Status: DISCONTINUED | OUTPATIENT
Start: 2017-10-01 | End: 2017-10-02

## 2017-10-01 RX ADMIN — Medication 12.5 MILLIGRAM(S): at 05:13

## 2017-10-01 RX ADMIN — Medication 1: at 17:33

## 2017-10-01 RX ADMIN — PIPERACILLIN AND TAZOBACTAM 25 GRAM(S): 4; .5 INJECTION, POWDER, LYOPHILIZED, FOR SOLUTION INTRAVENOUS at 05:13

## 2017-10-01 RX ADMIN — LOSARTAN POTASSIUM 100 MILLIGRAM(S): 100 TABLET, FILM COATED ORAL at 05:13

## 2017-10-01 RX ADMIN — Medication 150 MILLIGRAM(S): at 22:16

## 2017-10-01 RX ADMIN — Medication 1 TABLET(S): at 17:33

## 2017-10-01 RX ADMIN — Medication 1 TABLET(S): at 05:13

## 2017-10-01 RX ADMIN — Medication 150 MILLIGRAM(S): at 09:48

## 2017-10-01 RX ADMIN — PIPERACILLIN AND TAZOBACTAM 25 GRAM(S): 4; .5 INJECTION, POWDER, LYOPHILIZED, FOR SOLUTION INTRAVENOUS at 14:06

## 2017-10-01 RX ADMIN — ENOXAPARIN SODIUM 40 MILLIGRAM(S): 100 INJECTION SUBCUTANEOUS at 14:06

## 2017-10-01 RX ADMIN — Medication 50 MICROGRAM(S): at 05:13

## 2017-10-01 RX ADMIN — Medication 1: at 12:18

## 2017-10-01 NOTE — PROGRESS NOTE ADULT - ASSESSMENT
73 yo F with HTN, Type 2 DM and hypothyroidism, recent admission for right foot cellulitis s/p course of PO abx (clinda/cipro x 10d) now a/w recurrent R foot cellulitis (states tack in shoe) in context of DM peripheral neuropathy c/b unstable gait due to pain referred from podiatry office

## 2017-10-01 NOTE — PROGRESS NOTE ADULT - SUBJECTIVE AND OBJECTIVE BOX
Patient is a 72y old  Female who presents with a chief complaint of Right foot swelling, pain (29 Sep 2017 12:49)      SUBJECTIVE / OVERNIGHT EVENTS: Vanco level elevated yesterday AM vanco held PM dose given trough 16.2. contralateral shoe delivered states fits better. afebrile ON. all other review of systems neg    MEDICATIONS  (STANDING):  levothyroxine 50 MICROGram(s) Oral daily  ergocalciferol 83930 Unit(s) Oral <User Schedule>  hydrochlorothiazide 12.5 milliGRAM(s) Oral daily  losartan 100 milliGRAM(s) Oral daily  insulin lispro (HumaLOG) corrective regimen sliding scale   SubCutaneous three times a day before meals  insulin lispro (HumaLOG) corrective regimen sliding scale   SubCutaneous at bedtime  dextrose 5%. 1000 milliLiter(s) (50 mL/Hr) IV Continuous <Continuous>  dextrose 50% Injectable 12.5 Gram(s) IV Push once  dextrose 50% Injectable 25 Gram(s) IV Push once  dextrose 50% Injectable 25 Gram(s) IV Push once  enoxaparin Injectable 40 milliGRAM(s) SubCutaneous daily  piperacillin/tazobactam IVPB. 3.375 Gram(s) IV Intermittent every 8 hours  lactobacillus acidophilus 1 Tablet(s) Oral every 12 hours  vancomycin  IVPB 750 milliGRAM(s) IV Intermittent every 12 hours    MEDICATIONS  (PRN):  dextrose Gel 1 Dose(s) Oral once PRN Blood Glucose LESS THAN 70 milliGRAM(s)/deciliter  glucagon  Injectable 1 milliGRAM(s) IntraMuscular once PRN Glucose LESS THAN 70 milligrams/deciliter  naproxen 250 milliGRAM(s) Oral every 8 hours PRN arthritic pain        CAPILLARY BLOOD GLUCOSE  119 (01 Oct 2017 07:50)  170 (30 Sep 2017 21:10)  179 (30 Sep 2017 17:58)  162 (30 Sep 2017 12:42)        I&O's Summary      T(C): 36.7 (10-01-17 @ 04:52), Max: 36.8 (09-30-17 @ 21:10)  HR: 61 (10-01-17 @ 04:52) (61 - 64)  BP: 153/72 (10-01-17 @ 04:52) (127/57 - 153/72)  RR: 16 (10-01-17 @ 04:52) (16 - 18)  SpO2: 98% (10-01-17 @ 04:52) (98% - 99%)    PHYSICAL EXAM:      LABS:                        11.1   4.94  )-----------( 187      ( 01 Oct 2017 06:45 )             32.1     10-01    142  |  103  |  26<H>  ----------------------------<  119<H>  4.1   |  24  |  1.03    Ca    9.4      01 Oct 2017 06:00        Culture - Blood:   NO ORGANISMS ISOLATED  NO ORGANISMS ISOLATED AT 96 HOURS (09.26.17 @ 22:09)    Culture - Wound with Gram Stain:   H.PARAINFLUENZAE=BETA LACTAMASE=NEGATIVE  *STCN ISOLATED FROM LIQUID MEDIA  HPARA^Haemophilus parainfluenzae  STCN^Staphylococcus sp.,coag neg (09.26.17 @ 20:02)    Culture - Blood:   NO ORGANISMS ISOLATED  NO ORGANISMS ISOLATED AT 96 HOURS (09.26.17 @ 18:40)            RADIOLOGY & ADDITIONAL TESTS:    Imaging Personally Reviewed:    Consultant(s) Notes Reviewed:      Care Discussed with Consultants/Other Providers:

## 2017-10-01 NOTE — PROGRESS NOTE ADULT - PROBLEM SELECTOR PLAN 1
Due to R foot sub 2nd met head ulcer w/ cellulitis; OM r/o on MRI however with possible collection, podiatry has no plan for surgery, does not feel there is a collection on exam; TDAP 8/17/17  -c/w lowered vanco to 750 q 12 given elevated trough yesterday, zosyn, D5 consider switch to unasyn/PO after final cx  -repeat trough after 4th dose  -fall precautions  -per podiatry c/w betadine & dry dressing, OP podiatry f/u

## 2017-10-01 NOTE — PROGRESS NOTE ADULT - PROBLEM SELECTOR PLAN 3
HbA1c = 6.2% in August, no need to repeat; patient with neuropathy of feet  -FS qac and hs w/ SSI  -resume orals on discharge  -FS at goal on current regimen

## 2017-10-02 LAB
BUN SERPL-MCNC: 25 MG/DL — HIGH (ref 7–23)
CALCIUM SERPL-MCNC: 9.6 MG/DL — SIGNIFICANT CHANGE UP (ref 8.4–10.5)
CHLORIDE SERPL-SCNC: 103 MMOL/L — SIGNIFICANT CHANGE UP (ref 98–107)
CO2 SERPL-SCNC: 24 MMOL/L — SIGNIFICANT CHANGE UP (ref 22–31)
CREAT SERPL-MCNC: 1 MG/DL — SIGNIFICANT CHANGE UP (ref 0.5–1.3)
GLUCOSE SERPL-MCNC: 126 MG/DL — HIGH (ref 70–99)
HCT VFR BLD CALC: 35.2 % — SIGNIFICANT CHANGE UP (ref 34.5–45)
HGB BLD-MCNC: 12.2 G/DL — SIGNIFICANT CHANGE UP (ref 11.5–15.5)
MCHC RBC-ENTMCNC: 32.3 PG — SIGNIFICANT CHANGE UP (ref 27–34)
MCHC RBC-ENTMCNC: 34.7 % — SIGNIFICANT CHANGE UP (ref 32–36)
MCV RBC AUTO: 93.1 FL — SIGNIFICANT CHANGE UP (ref 80–100)
NRBC # FLD: 0 — SIGNIFICANT CHANGE UP
PLATELET # BLD AUTO: 222 K/UL — SIGNIFICANT CHANGE UP (ref 150–400)
PMV BLD: 10.2 FL — SIGNIFICANT CHANGE UP (ref 7–13)
POTASSIUM SERPL-MCNC: 4 MMOL/L — SIGNIFICANT CHANGE UP (ref 3.5–5.3)
POTASSIUM SERPL-SCNC: 4 MMOL/L — SIGNIFICANT CHANGE UP (ref 3.5–5.3)
RBC # BLD: 3.78 M/UL — LOW (ref 3.8–5.2)
RBC # FLD: 12.4 % — SIGNIFICANT CHANGE UP (ref 10.3–14.5)
SODIUM SERPL-SCNC: 143 MMOL/L — SIGNIFICANT CHANGE UP (ref 135–145)
VANCOMYCIN TROUGH SERPL-MCNC: 23.2 UG/ML — HIGH (ref 10–20)
WBC # BLD: 5.32 K/UL — SIGNIFICANT CHANGE UP (ref 3.8–10.5)
WBC # FLD AUTO: 5.32 K/UL — SIGNIFICANT CHANGE UP (ref 3.8–10.5)

## 2017-10-02 PROCEDURE — 99233 SBSQ HOSP IP/OBS HIGH 50: CPT

## 2017-10-02 RX ORDER — AMPICILLIN SODIUM AND SULBACTAM SODIUM 250; 125 MG/ML; MG/ML
3 INJECTION, POWDER, FOR SUSPENSION INTRAMUSCULAR; INTRAVENOUS EVERY 6 HOURS
Qty: 0 | Refills: 0 | Status: DISCONTINUED | OUTPATIENT
Start: 2017-10-02 | End: 2017-10-04

## 2017-10-02 RX ADMIN — Medication 1 TABLET(S): at 07:42

## 2017-10-02 RX ADMIN — Medication 12.5 MILLIGRAM(S): at 05:30

## 2017-10-02 RX ADMIN — LOSARTAN POTASSIUM 100 MILLIGRAM(S): 100 TABLET, FILM COATED ORAL at 05:30

## 2017-10-02 RX ADMIN — AMPICILLIN SODIUM AND SULBACTAM SODIUM 200 GRAM(S): 250; 125 INJECTION, POWDER, FOR SUSPENSION INTRAMUSCULAR; INTRAVENOUS at 23:45

## 2017-10-02 RX ADMIN — AMPICILLIN SODIUM AND SULBACTAM SODIUM 200 GRAM(S): 250; 125 INJECTION, POWDER, FOR SUSPENSION INTRAMUSCULAR; INTRAVENOUS at 17:03

## 2017-10-02 RX ADMIN — PIPERACILLIN AND TAZOBACTAM 25 GRAM(S): 4; .5 INJECTION, POWDER, LYOPHILIZED, FOR SOLUTION INTRAVENOUS at 00:05

## 2017-10-02 RX ADMIN — ENOXAPARIN SODIUM 40 MILLIGRAM(S): 100 INJECTION SUBCUTANEOUS at 14:41

## 2017-10-02 RX ADMIN — ERGOCALCIFEROL 50000 UNIT(S): 1.25 CAPSULE ORAL at 07:42

## 2017-10-02 RX ADMIN — PIPERACILLIN AND TAZOBACTAM 25 GRAM(S): 4; .5 INJECTION, POWDER, LYOPHILIZED, FOR SOLUTION INTRAVENOUS at 07:39

## 2017-10-02 RX ADMIN — Medication 1: at 14:42

## 2017-10-02 RX ADMIN — Medication 1 TABLET(S): at 17:03

## 2017-10-02 RX ADMIN — Medication 50 MICROGRAM(S): at 07:42

## 2017-10-02 NOTE — PROGRESS NOTE ADULT - PROBLEM SELECTOR PLAN 1
- Due to R foot sub 2nd met head ulcer w/ cellulitis;   - OM excluded on MRI however with possible collection, podiatry has no plan for surgery, does not feel there is a collection on exam; TDAP 8/17/17  - Will switch IV abx to Unasyn to complete 3 more days of IV abx   -per podiatry c/w betadine & dry dressing, OP podiatry and wound care f/u

## 2017-10-02 NOTE — PROGRESS NOTE ADULT - PROBLEM SELECTOR PLAN 3
- HbA1c = 6.2% in August, no need to repeat; patient with neuropathy of feet  - FS qac and hs w/ SSI  -resume orals on discharge  -FS at goal on current regimen

## 2017-10-02 NOTE — PROGRESS NOTE ADULT - ASSESSMENT
73 yo F with HTN, Type 2 DM and hypothyroidism, recent admission for right foot cellulitis s/p course of PO abx (clinda/cipro x 10d) now a/w recurrent R foot cellulitis (states tack in shoe) in context of DM peripheral neuropathy c/b unstable gait due to pain. Referred from podiatry office

## 2017-10-02 NOTE — PROGRESS NOTE ADULT - SUBJECTIVE AND OBJECTIVE BOX
Patient is a 72y old  Female who presents with a chief complaint of Right foot swelling, pain (29 Sep 2017 12:49)      SUBJECTIVE / OVERNIGHT EVENTS: No overnight events, pt remains afebrile, no chills.  Still has mild pain on the R lateral aspect of her foot.  Wounds are healing well, non-draining.       MEDICATIONS  (STANDING):  levothyroxine 50 MICROGram(s) Oral daily  ergocalciferol 49130 Unit(s) Oral <User Schedule>  hydrochlorothiazide 12.5 milliGRAM(s) Oral daily  losartan 100 milliGRAM(s) Oral daily  insulin lispro (HumaLOG) corrective regimen sliding scale   SubCutaneous three times a day before meals  insulin lispro (HumaLOG) corrective regimen sliding scale   SubCutaneous at bedtime  dextrose 5%. 1000 milliLiter(s) (50 mL/Hr) IV Continuous <Continuous>  dextrose 50% Injectable 12.5 Gram(s) IV Push once  dextrose 50% Injectable 25 Gram(s) IV Push once  dextrose 50% Injectable 25 Gram(s) IV Push once  enoxaparin Injectable 40 milliGRAM(s) SubCutaneous daily  lactobacillus acidophilus 1 Tablet(s) Oral every 12 hours  ampicillin/sulbactam  IVPB 3 Gram(s) IV Intermittent every 6 hours    MEDICATIONS  (PRN):  dextrose Gel 1 Dose(s) Oral once PRN Blood Glucose LESS THAN 70 milliGRAM(s)/deciliter  glucagon  Injectable 1 milliGRAM(s) IntraMuscular once PRN Glucose LESS THAN 70 milligrams/deciliter  naproxen 250 milliGRAM(s) Oral every 8 hours PRN arthritic pain      Vital Signs Last 24 Hrs  T(C): 36.4 (02 Oct 2017 05:28), Max: 36.9 (01 Oct 2017 21:33)  T(F): 97.5 (02 Oct 2017 05:28), Max: 98.4 (01 Oct 2017 21:33)  HR: 60 (02 Oct 2017 05:28) (60 - 64)  BP: 137/55 (02 Oct 2017 05:28) (128/64 - 137/55)  BP(mean): --  RR: 18 (02 Oct 2017 05:28) (18 - 18)  SpO2: 100% (02 Oct 2017 05:28) (97% - 100%)  CAPILLARY BLOOD GLUCOSE  120 (02 Oct 2017 07:27)  235 (01 Oct 2017 21:46)  184 (01 Oct 2017 17:24)      PHYSICAL EXAM  GENERAL: NAD, well-developed  HEAD:  Atraumatic, Normocephalic  EYES: EOMI, PERRLA, conjunctiva and sclera clear  NECK: Supple, No JVD  CHEST/LUNG: Clear to auscultation bilaterally; No wheeze  HEART: Regular rate and rhythm; No murmurs, rubs, or gallops  ABDOMEN: Soft, Nontender, Nondistended; Bowel sounds present  EXTREMITIES:  2+ Peripheral Pulses, No clubbing, cyanosis, or edema  PSYCH: AAOx3  SKIN: 1cm x 1cm non draining ulcer on the ball of R foot, no cellulitis.     LABS:                        12.2   5.32  )-----------( 222      ( 02 Oct 2017 09:04 )             35.2     10-02    143  |  103  |  25<H>  ----------------------------<  126<H>  4.0   |  24  |  1.00    Ca    9.6      02 Oct 2017 09:04    Culture - Wound with Gram Stain (09.26.17 @ 20:02)    Culture - Wound with Gram Stain:   H.PARAINFLUENZAE=BETA LACTAMASE=NEGATIVE  *STCN ISOLATED FROM LIQUID MEDIA  HPARA^Haemophilus parainfluenzae  STCN^Staphylococcus sp.,coag neg    Specimen Source: OTHER      Culture - Blood (09.26.17 @ 22:09)    Culture - Blood:   NO ORGANISMS ISOLATED    Specimen Source: BLOOD VENOUS      RADIOLOGY & ADDITIONAL TESTS:    Imaging Personally Reviewed:  < from: MRI Foot w/wo Cont, Right (09.27.17 @ 20:19) >  IMPRESSION:  Plantar forefoot soft tissue ulceration over 2nd MTP region.    Generalized cellulitic change. Tiny circumscribed rim-enhancing fluid   collection in the plantar subcutaneous tissues between the 2nd and 3rd   metatarsal heads deep to the ulceration bed(series 12 image 12; series   11 image 25; and series 13 image 16) . No additional suspected   collections.    No MR evidence for osteomyelitis.    < end of copied text >    Consultant(s) Notes Reviewed:  ID    Care Discussed with Consultants/Other Providers: ID Dr. Ying

## 2017-10-03 ENCOUNTER — APPOINTMENT (OUTPATIENT)
Dept: OPHTHALMOLOGY | Facility: CLINIC | Age: 72
End: 2017-10-03

## 2017-10-03 LAB
BUN SERPL-MCNC: 28 MG/DL — HIGH (ref 7–23)
CALCIUM SERPL-MCNC: 9.5 MG/DL — SIGNIFICANT CHANGE UP (ref 8.4–10.5)
CHLORIDE SERPL-SCNC: 102 MMOL/L — SIGNIFICANT CHANGE UP (ref 98–107)
CO2 SERPL-SCNC: 22 MMOL/L — SIGNIFICANT CHANGE UP (ref 22–31)
CREAT SERPL-MCNC: 0.97 MG/DL — SIGNIFICANT CHANGE UP (ref 0.5–1.3)
GLUCOSE SERPL-MCNC: 178 MG/DL — HIGH (ref 70–99)
HCT VFR BLD CALC: 35.1 % — SIGNIFICANT CHANGE UP (ref 34.5–45)
HGB BLD-MCNC: 12.2 G/DL — SIGNIFICANT CHANGE UP (ref 11.5–15.5)
MCHC RBC-ENTMCNC: 31.8 PG — SIGNIFICANT CHANGE UP (ref 27–34)
MCHC RBC-ENTMCNC: 34.8 % — SIGNIFICANT CHANGE UP (ref 32–36)
MCV RBC AUTO: 91.4 FL — SIGNIFICANT CHANGE UP (ref 80–100)
NRBC # FLD: 0 — SIGNIFICANT CHANGE UP
PLATELET # BLD AUTO: 236 K/UL — SIGNIFICANT CHANGE UP (ref 150–400)
PMV BLD: 9.8 FL — SIGNIFICANT CHANGE UP (ref 7–13)
POTASSIUM SERPL-MCNC: 4.3 MMOL/L — SIGNIFICANT CHANGE UP (ref 3.5–5.3)
POTASSIUM SERPL-SCNC: 4.3 MMOL/L — SIGNIFICANT CHANGE UP (ref 3.5–5.3)
RBC # BLD: 3.84 M/UL — SIGNIFICANT CHANGE UP (ref 3.8–5.2)
RBC # FLD: 12.5 % — SIGNIFICANT CHANGE UP (ref 10.3–14.5)
SODIUM SERPL-SCNC: 141 MMOL/L — SIGNIFICANT CHANGE UP (ref 135–145)
WBC # BLD: 6.35 K/UL — SIGNIFICANT CHANGE UP (ref 3.8–10.5)
WBC # FLD AUTO: 6.35 K/UL — SIGNIFICANT CHANGE UP (ref 3.8–10.5)

## 2017-10-03 PROCEDURE — 99232 SBSQ HOSP IP/OBS MODERATE 35: CPT

## 2017-10-03 RX ADMIN — LOSARTAN POTASSIUM 100 MILLIGRAM(S): 100 TABLET, FILM COATED ORAL at 06:23

## 2017-10-03 RX ADMIN — ENOXAPARIN SODIUM 40 MILLIGRAM(S): 100 INJECTION SUBCUTANEOUS at 13:22

## 2017-10-03 RX ADMIN — Medication 1: at 13:22

## 2017-10-03 RX ADMIN — AMPICILLIN SODIUM AND SULBACTAM SODIUM 200 GRAM(S): 250; 125 INJECTION, POWDER, FOR SUSPENSION INTRAMUSCULAR; INTRAVENOUS at 06:23

## 2017-10-03 RX ADMIN — AMPICILLIN SODIUM AND SULBACTAM SODIUM 200 GRAM(S): 250; 125 INJECTION, POWDER, FOR SUSPENSION INTRAMUSCULAR; INTRAVENOUS at 13:22

## 2017-10-03 RX ADMIN — Medication 1 TABLET(S): at 06:22

## 2017-10-03 RX ADMIN — Medication 250 MILLIGRAM(S): at 07:22

## 2017-10-03 RX ADMIN — Medication 1: at 17:17

## 2017-10-03 RX ADMIN — Medication 12.5 MILLIGRAM(S): at 06:22

## 2017-10-03 RX ADMIN — Medication 250 MILLIGRAM(S): at 06:22

## 2017-10-03 RX ADMIN — Medication 50 MICROGRAM(S): at 06:23

## 2017-10-03 RX ADMIN — Medication 1 TABLET(S): at 17:17

## 2017-10-03 RX ADMIN — AMPICILLIN SODIUM AND SULBACTAM SODIUM 200 GRAM(S): 250; 125 INJECTION, POWDER, FOR SUSPENSION INTRAMUSCULAR; INTRAVENOUS at 23:33

## 2017-10-03 RX ADMIN — AMPICILLIN SODIUM AND SULBACTAM SODIUM 200 GRAM(S): 250; 125 INJECTION, POWDER, FOR SUSPENSION INTRAMUSCULAR; INTRAVENOUS at 19:22

## 2017-10-03 NOTE — PROGRESS NOTE ADULT - ASSESSMENT
puncture foot wound infection improved - cultures yield H paraflu (Grop B Strep on recent hospitalization)    importance of inspecting foot daily emphasized with patient - She will follow up with Podiatry after discharge.  Suggest: if remains stable, change IV to po Augmenting 875 po Twice daily in am: 10/4 --> 10/6/17

## 2017-10-03 NOTE — PROGRESS NOTE ADULT - SUBJECTIVE AND OBJECTIVE BOX
Follow Up:      Interval History/ROS: improved, decreased foot pain, ambulating    Allergies  No Known Allergies    ANTIMICROBIALS:  ampicillin/sulbactam  IVPB 3 every 6 hours; 10/2--: (Vanco 9/26 --> 10/1; Zosyn 9/26--> 10/2)    OTHER MEDS:  MEDICATIONS  (STANDING):  dextrose 50% Injectable 12.5 once  dextrose 50% Injectable 25 once  dextrose 50% Injectable 25 once  dextrose Gel 1 once PRN  enoxaparin Injectable 40 daily  glucagon  Injectable 1 once PRN  hydrochlorothiazide 12.5 daily  insulin lispro (HumaLOG) corrective regimen sliding scale  three times a day before meals  insulin lispro (HumaLOG) corrective regimen sliding scale  at bedtime  levothyroxine 50 daily  losartan 100 daily  naproxen 250 every 8 hours PRN    Vital Signs Last 24 Hrs  T(C): 36.6 (03 Oct 2017 13:36), Max: 37.2 (02 Oct 2017 20:47)  T(F): 97.9 (03 Oct 2017 13:36), Max: 98.9 (02 Oct 2017 20:47)  HR: 66 (03 Oct 2017 13:36) (60 - 66)  BP: 133/82 (03 Oct 2017 13:36) (128/55 - 143/77)  BP(mean): --  RR: 18 (03 Oct 2017 13:36) (17 - 18)  SpO2: 100% (03 Oct 2017 13:36) (97% - 100%)    PHYSICAL EXAM:  General: WN/WD NAD, Non-toxic  Neurology: A&Ox3, nonfocal  Respiratory: no resp distress  Extremities: dressing clean and dry  Skin: No rash                      12.2   6.35  )-----------( 236      ( 03 Oct 2017 09:30 )             35.1       10-03    141  |  102  |  28<H>  ----------------------------<  178<H>  4.3   |  22  |  0.97    Ca    9.5      03 Oct 2017 09:30      MICROBIOLOGY:  BLOOD VENOUS  09-26-17 --  --  --      OTHER  09-26-17 --  --  --      BLOOD PERIPHERAL  09-26-17 --  --  --    RADIOLOGY:    Taras Ying MD; Division of Infectious Disease; Pager: [phone]; nights and weekends: [phone]

## 2017-10-03 NOTE — DIETITIAN INITIAL EVALUATION ADULT. - PROBLEM SELECTOR PLAN 1
Due to Rt foot sub 2nd met head ulcer to capsule w/ cellulitis; r/o OM;  -c/w vanc, zosyn, f/u wound and blood cultures   -f/u Vancomycin trough prior 4th dose  -f/u official read of right foot xray, pending MRI, podiatry following and no intervention needed pending MRI  -fall precautions  -will need PT eval

## 2017-10-03 NOTE — PROGRESS NOTE ADULT - PROBLEM SELECTOR PLAN 1
- Due to R foot sub 2nd met head ulcer w/ cellulitis  - OM excluded on MRI however with possible collection, podiatry has no plan for surgery, does not feel there is a collection on exam; TDAP 8/17/17  - Will switch IV abx to Unasyn to complete 3 more days of IV abx through Thursday   -per podiatry c/w betadine & dry dressing, OP podiatry and wound care f/u

## 2017-10-03 NOTE — DIETITIAN INITIAL EVALUATION ADULT. - OTHER INFO
Pt identified via length of stay. Pt 71 yo female appears alert, oriented. Per Pt her appetite "so so", not that good lately. Pt also stated het body weight (PTA): 199#, stable for past 9-10 months. But in 2015 Pt had surgery and lost ~30-32# at that time. Food preferences discussed with Pt. No chew/swallow problem voiced; no nausea/vomiting/diarrhea reported @ present. Per Pt at home she tries to avoid sugar but adds honey to her beverages “at times”. Consistent Carbohydrate discussed with Pt; written materials on diet also provided. RDN remains available, Pt made aware.

## 2017-10-03 NOTE — DIETITIAN INITIAL EVALUATION ADULT. - NS AS NUTRI INTERV MEALS SNACK
Other (specify)/Diets modified for specific foods and ingredients/1. Suggest: PO diet rx: Consistent Carbohydrate (no snacks), DASH/TLC (cholesterol and sodium restricted);               2. Monitor PO diet tolerance; Honor food preferences;          3. Monitor labs, weights, hydration status;

## 2017-10-03 NOTE — DIETITIAN INITIAL EVALUATION ADULT. - PROBLEM SELECTOR PLAN 2
-dehydration with mild free water deficit due to reduced PO intake over past two days   -will start IVF 1/2NS @75 cc/hr

## 2017-10-03 NOTE — PROGRESS NOTE ADULT - ASSESSMENT
71 yo F with HTN, Type 2 DM and hypothyroidism, recent admission for right foot cellulitis s/p course of PO abx (clinda/cipro x 10d) now a/w recurrent R foot cellulitis (states tack in shoe) in context of DM peripheral neuropathy c/b unstable gait due to pain. Referred from podiatry office

## 2017-10-03 NOTE — DIETITIAN INITIAL EVALUATION ADULT. - PROBLEM SELECTOR PLAN 3
-pt with AG acidosis on labs likely 2/2 fasting ketoacidosis in setting of decrease PO intake  -c/w IVF as above. check conf. b-hydroxybutyrate

## 2017-10-03 NOTE — PROGRESS NOTE ADULT - SUBJECTIVE AND OBJECTIVE BOX
Patient is a 72y old  Female who presents with a chief complaint of Right foot swelling, pain (29 Sep 2017 12:49)      SUBJECTIVE / OVERNIGHT EVENTS: No overnight events, remains afebrile, able to ambulate now.  Still with R lateral foot pain.       MEDICATIONS  (STANDING):  ampicillin/sulbactam  IVPB 3 Gram(s) IV Intermittent every 6 hours  dextrose 5%. 1000 milliLiter(s) (50 mL/Hr) IV Continuous <Continuous>  dextrose 50% Injectable 12.5 Gram(s) IV Push once  dextrose 50% Injectable 25 Gram(s) IV Push once  dextrose 50% Injectable 25 Gram(s) IV Push once  enoxaparin Injectable 40 milliGRAM(s) SubCutaneous daily  ergocalciferol 99517 Unit(s) Oral <User Schedule>  hydrochlorothiazide 12.5 milliGRAM(s) Oral daily  insulin lispro (HumaLOG) corrective regimen sliding scale   SubCutaneous three times a day before meals  insulin lispro (HumaLOG) corrective regimen sliding scale   SubCutaneous at bedtime  lactobacillus acidophilus 1 Tablet(s) Oral every 12 hours  levothyroxine 50 MICROGram(s) Oral daily  losartan 100 milliGRAM(s) Oral daily    MEDICATIONS  (PRN):  dextrose Gel 1 Dose(s) Oral once PRN Blood Glucose LESS THAN 70 milliGRAM(s)/deciliter  glucagon  Injectable 1 milliGRAM(s) IntraMuscular once PRN Glucose LESS THAN 70 milligrams/deciliter  naproxen 250 milliGRAM(s) Oral every 8 hours PRN arthritic pain      Vital Signs Last 24 Hrs  T(C): 36.1 (03 Oct 2017 06:20), Max: 37.2 (02 Oct 2017 20:47)  T(F): 97 (03 Oct 2017 06:20), Max: 98.9 (02 Oct 2017 20:47)  HR: 60 (03 Oct 2017 06:20) (60 - 62)  BP: 143/77 (03 Oct 2017 06:20) (112/58 - 143/77)  BP(mean): --  RR: 18 (03 Oct 2017 06:20) (17 - 18)  SpO2: 97% (03 Oct 2017 06:20) (97% - 100%)  CAPILLARY BLOOD GLUCOSE  144 (03 Oct 2017 08:54)  249 (02 Oct 2017 20:35)  134 (02 Oct 2017 18:04)  160 (02 Oct 2017 14:44      PHYSICAL EXAM  GENERAL: NAD, well-developed  HEAD:  Atraumatic, Normocephalic  EYES: EOMI, PERRLA, conjunctiva and sclera clear  NECK: Supple, No JVD  CHEST/LUNG: Clear to auscultation bilaterally; No wheeze  HEART: Regular rate and rhythm; No murmurs, rubs, or gallops  ABDOMEN: Soft, Nontender, Nondistended; Bowel sounds present  EXTREMITIES:  2+ Peripheral Pulses, No clubbing, cyanosis, or edema, R foot ulcer healing well, no signs of cellulitis or purulence.    PSYCH: AAOx3    LABS:                        12.2   6.35  )-----------( 236      ( 03 Oct 2017 09:30 )             35.1     10-03    141  |  102  |  28<H>  ----------------------------<  178<H>  4.3   |  22  |  0.97    Ca    9.5      03 Oct 2017 09:30          RADIOLOGY & ADDITIONAL TESTS:    Imaging Personally Reviewed:  < from: MRI Foot w/wo Cont, Right (09.27.17 @ 20:19) >  IMPRESSION:  Plantar forefoot soft tissue ulceration over 2nd MTP region.    Generalized cellulitic change. Tiny circumscribed rim-enhancing fluid   collection in the plantar subcutaneous tissues between the 2nd and 3rd   metatarsal heads deep to the ulceration bed(series 12 image 12; series   11 image 25; and series 13 image 16) . No additional suspected   collections.    No MR evidence for osteomyelitis.    < end of copied text >    Consultant(s) Notes Reviewed:  ID , podiatry     Care Discussed with Consultants/Other Providers: ID

## 2017-10-04 VITALS — HEART RATE: 60 BPM

## 2017-10-04 LAB
BUN SERPL-MCNC: 23 MG/DL — SIGNIFICANT CHANGE UP (ref 7–23)
CALCIUM SERPL-MCNC: 9.2 MG/DL — SIGNIFICANT CHANGE UP (ref 8.4–10.5)
CHLORIDE SERPL-SCNC: 104 MMOL/L — SIGNIFICANT CHANGE UP (ref 98–107)
CO2 SERPL-SCNC: 22 MMOL/L — SIGNIFICANT CHANGE UP (ref 22–31)
CREAT SERPL-MCNC: 0.82 MG/DL — SIGNIFICANT CHANGE UP (ref 0.5–1.3)
GLUCOSE SERPL-MCNC: 118 MG/DL — HIGH (ref 70–99)
HCT VFR BLD CALC: 34.5 % — SIGNIFICANT CHANGE UP (ref 34.5–45)
HGB BLD-MCNC: 11.9 G/DL — SIGNIFICANT CHANGE UP (ref 11.5–15.5)
MCHC RBC-ENTMCNC: 32.2 PG — SIGNIFICANT CHANGE UP (ref 27–34)
MCHC RBC-ENTMCNC: 34.5 % — SIGNIFICANT CHANGE UP (ref 32–36)
MCV RBC AUTO: 93.5 FL — SIGNIFICANT CHANGE UP (ref 80–100)
NRBC # FLD: 0 — SIGNIFICANT CHANGE UP
PLATELET # BLD AUTO: 193 K/UL — SIGNIFICANT CHANGE UP (ref 150–400)
PMV BLD: 10 FL — SIGNIFICANT CHANGE UP (ref 7–13)
POTASSIUM SERPL-MCNC: 3.8 MMOL/L — SIGNIFICANT CHANGE UP (ref 3.5–5.3)
POTASSIUM SERPL-SCNC: 3.8 MMOL/L — SIGNIFICANT CHANGE UP (ref 3.5–5.3)
RBC # BLD: 3.69 M/UL — LOW (ref 3.8–5.2)
RBC # FLD: 12.3 % — SIGNIFICANT CHANGE UP (ref 10.3–14.5)
SODIUM SERPL-SCNC: 143 MMOL/L — SIGNIFICANT CHANGE UP (ref 135–145)
WBC # BLD: 5.74 K/UL — SIGNIFICANT CHANGE UP (ref 3.8–10.5)
WBC # FLD AUTO: 5.74 K/UL — SIGNIFICANT CHANGE UP (ref 3.8–10.5)

## 2017-10-04 PROCEDURE — 99239 HOSP IP/OBS DSCHRG MGMT >30: CPT

## 2017-10-04 RX ORDER — LACTOBACILLUS ACIDOPHILUS 100MM CELL
1 CAPSULE ORAL
Qty: 21 | Refills: 0 | OUTPATIENT
Start: 2017-10-04 | End: 2017-10-11

## 2017-10-04 RX ADMIN — AMPICILLIN SODIUM AND SULBACTAM SODIUM 200 GRAM(S): 250; 125 INJECTION, POWDER, FOR SUSPENSION INTRAMUSCULAR; INTRAVENOUS at 13:36

## 2017-10-04 RX ADMIN — Medication 2: at 13:35

## 2017-10-04 RX ADMIN — Medication 1 TABLET(S): at 06:04

## 2017-10-04 RX ADMIN — Medication 50 MICROGRAM(S): at 06:04

## 2017-10-04 RX ADMIN — LOSARTAN POTASSIUM 100 MILLIGRAM(S): 100 TABLET, FILM COATED ORAL at 06:04

## 2017-10-04 RX ADMIN — Medication 12.5 MILLIGRAM(S): at 06:04

## 2017-10-04 RX ADMIN — AMPICILLIN SODIUM AND SULBACTAM SODIUM 200 GRAM(S): 250; 125 INJECTION, POWDER, FOR SUSPENSION INTRAMUSCULAR; INTRAVENOUS at 06:04

## 2017-10-04 NOTE — PROGRESS NOTE ADULT - PROBLEM SELECTOR PROBLEM 5
Arthropathy
Type 2 diabetes mellitus without complication, without long-term current use of insulin
Arthropathy
Arthropathy

## 2017-10-04 NOTE — PROGRESS NOTE ADULT - PROBLEM SELECTOR PROBLEM 1
Cellulitis of foot, right

## 2017-10-04 NOTE — PROGRESS NOTE ADULT - ASSESSMENT
Improved  cautions and importance of regular inspection of feet and foot ware daily re-emphasized    Suggest: can change to po Augmentin 875 twice daily through 10/6

## 2017-10-04 NOTE — PROGRESS NOTE ADULT - PROVIDER SPECIALTY LIST ADULT
Hospitalist
Infectious Disease
Internal Medicine
Podiatry
Infectious Disease
Internal Medicine
Hospitalist
Internal Medicine
Hospitalist

## 2017-10-04 NOTE — PROGRESS NOTE ADULT - PROBLEM SELECTOR PROBLEM 4
Hypothyroidism, unspecified type
Hypothyroidism, unspecified type
Essential hypertension
Hypothyroidism, unspecified type

## 2017-10-04 NOTE — PROGRESS NOTE ADULT - PROBLEM SELECTOR PROBLEM 2
Essential hypertension
Hypernatremia
Essential hypertension
Essential hypertension

## 2017-10-04 NOTE — PROGRESS NOTE ADULT - PROBLEM SELECTOR PLAN 6
-lovenox ppx
-lovenox ppx  Patient feels well, states  can help with dressing changes  - Stable for d/c home today.  D/C time 35 minutes
-lovenox ppx    Patient will be ready for dispo pending PO options for treatment (previously tried cipro/clinda)  Patient feels well, states  can help with dressing changed  40 min spent on dispo
-lovenox ppx  Patient feels well, states  can help with dressing changes
-c/w synthroid
-lovenox ppx  Patient feels well, states  can help with dressing changed
-lovenox ppx  Patient feels well, states  can help with dressing changed
-lovenox ppx  Patient feels well, states  can help with dressing changes

## 2017-10-04 NOTE — PROGRESS NOTE ADULT - SUBJECTIVE AND OBJECTIVE BOX
Follow Up:      Interval History/ROS: "good"    Allergies  No Known Allergies    ANTIMICROBIALS:  ampicillin/sulbactam  IVPB 3 every 6 hours      OTHER MEDS:  MEDICATIONS  (STANDING):  dextrose 50% Injectable 12.5 once  dextrose 50% Injectable 25 once  dextrose 50% Injectable 25 once  dextrose Gel 1 once PRN  enoxaparin Injectable 40 daily  glucagon  Injectable 1 once PRN  hydrochlorothiazide 12.5 daily  insulin lispro (HumaLOG) corrective regimen sliding scale  three times a day before meals  insulin lispro (HumaLOG) corrective regimen sliding scale  at bedtime  levothyroxine 50 daily  losartan 100 daily  naproxen 250 every 8 hours PRN      Vital Signs Last 24 Hrs  T(C): 36.4 (04 Oct 2017 06:02), Max: 36.7 (03 Oct 2017 21:26)  T(F): 97.6 (04 Oct 2017 06:02), Max: 98 (03 Oct 2017 21:26)  HR: 60 (04 Oct 2017 07:33) (60 - 66)  BP: 118/61 (04 Oct 2017 07:33) (118/61 - 135/59)  BP(mean): --  RR: 16 (04 Oct 2017 07:33) (16 - 18)  SpO2: 100% (04 Oct 2017 07:33) (99% - 100%)    PHYSICAL EXAM:  General: WN/WD NAD, Non-toxic  Neurology: A&Ox3, nonfocal  Respiratory: ne distress  CV: RRR, S1S2, no murmurs, rubs or gallops  Abdominal: Soft, Non-tender, non-distended, normal bowel sounds  Extremities: No edema, no erythema, clean non draining wound, no cellulitic reaction, no local erythema  Line Sites: Clear  Skin: No rash                          11.9   5.74  )-----------( 193      ( 04 Oct 2017 07:15 )             34.5       10-04    143  |  104  |  23  ----------------------------<  118<H>  3.8   |  22  |  0.82    Ca    9.2      04 Oct 2017 07:11            MICROBIOLOGY:  BLOOD VENOUS  09-26-17 --  --  --      BLOOD PERIPHERAL  09-26-17 --  --  --    Culture - Wound with Gram Stain (09.26.17 @ 20:02)    Culture - Wound with Gram Stain:   H.PARAINFLUENZAE=BETA LACTAMASE=NEGATIVE  *STCN ISOLATED FROM LIQUID MEDIA  HPARA^Haemophilus parainfluenzae  STCN^Staphylococcus sp.,coag neg    Specimen Source: OTHER              RADIOLOGY:    Taras Ying MD; Division of Infectious Disease; Pager: [phone]; nights and weekends: [phone]

## 2017-10-04 NOTE — PROGRESS NOTE ADULT - PROBLEM SELECTOR PLAN 1
- Ulcer is healing well, after discussion with ID Attending Dr. Ying, will switch pt to PO Augmentin today for 2 more days.   -per podiatry c/w betadine & dry dressing, OP podiatry and wound care f/u

## 2017-10-04 NOTE — PROGRESS NOTE ADULT - PROBLEM SELECTOR PROBLEM 6
Prophylactic use of low molecular weight heparin for venous thromboembolism
Hypothyroidism, unspecified type
Prophylactic use of low molecular weight heparin for venous thromboembolism

## 2017-10-04 NOTE — PROGRESS NOTE ADULT - PROBLEM SELECTOR PLAN 4
-c/w synthroid
-c/w synthroid
-c/w HCTZ/losartan
-c/w synthroid

## 2017-10-04 NOTE — PROGRESS NOTE ADULT - SUBJECTIVE AND OBJECTIVE BOX
Patient is a 72y old  Female who presents with a chief complaint of Right foot swelling, pain (29 Sep 2017 12:49)      SUBJECTIVE / OVERNIGHT EVENTS:  No overnight events, pt feels well and anxious to go home       MEDICATIONS  (STANDING):  ampicillin/sulbactam  IVPB 3 Gram(s) IV Intermittent every 6 hours  dextrose 5%. 1000 milliLiter(s) (50 mL/Hr) IV Continuous <Continuous>  dextrose 50% Injectable 12.5 Gram(s) IV Push once  dextrose 50% Injectable 25 Gram(s) IV Push once  dextrose 50% Injectable 25 Gram(s) IV Push once  enoxaparin Injectable 40 milliGRAM(s) SubCutaneous daily  ergocalciferol 75177 Unit(s) Oral <User Schedule>  hydrochlorothiazide 12.5 milliGRAM(s) Oral daily  insulin lispro (HumaLOG) corrective regimen sliding scale   SubCutaneous three times a day before meals  insulin lispro (HumaLOG) corrective regimen sliding scale   SubCutaneous at bedtime  lactobacillus acidophilus 1 Tablet(s) Oral every 12 hours  levothyroxine 50 MICROGram(s) Oral daily  losartan 100 milliGRAM(s) Oral daily    MEDICATIONS  (PRN):  dextrose Gel 1 Dose(s) Oral once PRN Blood Glucose LESS THAN 70 milliGRAM(s)/deciliter  glucagon  Injectable 1 milliGRAM(s) IntraMuscular once PRN Glucose LESS THAN 70 milligrams/deciliter  naproxen 250 milliGRAM(s) Oral every 8 hours PRN arthritic pain      Vital Signs Last 24 Hrs  T(C): 36.4 (04 Oct 2017 06:02), Max: 36.7 (03 Oct 2017 21:26)  T(F): 97.6 (04 Oct 2017 06:02), Max: 98 (03 Oct 2017 21:26)  HR: 60 (04 Oct 2017 07:33) (60 - 66)  BP: 118/61 (04 Oct 2017 07:33) (118/61 - 135/59)  BP(mean): --  RR: 16 (04 Oct 2017 07:33) (16 - 18)  SpO2: 100% (04 Oct 2017 07:33) (99% - 100%)  CAPILLARY BLOOD GLUCOSE  244 (04 Oct 2017 12:15)  125 (04 Oct 2017 08:58)  170 (03 Oct 2017 21:20)  182 (03 Oct 2017 16:48)        PHYSICAL EXAM  GENERAL: NAD, well-developed  HEAD:  Atraumatic, Normocephalic  EYES: EOMI, PERRLA, conjunctiva and sclera clear  NECK: Supple, No JVD  CHEST/LUNG: Clear to auscultation bilaterally; No wheeze  HEART: Regular rate and rhythm; No murmurs, rubs, or gallops  ABDOMEN: Soft, Nontender, Nondistended; Bowel sounds present  EXTREMITIES:  2+ Peripheral Pulses, No clubbing, cyanosis, or edema, R ball of foot ulcer healing well, nonpurulent, no surrounding cellulitis  PSYCH: AAOx3    LABS:                        11.9   5.74  )-----------( 193      ( 04 Oct 2017 07:15 )             34.5     10-04    143  |  104  |  23  ----------------------------<  118<H>  3.8   |  22  |  0.82    Ca    9.2      04 Oct 2017 07:11          Consultant(s) Notes Reviewed:  ID   Care Discussed with Consultants/Other Providers: ID Dr. Ying

## 2017-10-04 NOTE — PROGRESS NOTE ADULT - PROBLEM SELECTOR PLAN 2
-c/w HCTZ/losartan
-c/w HCTZ/losartan  -monitor BP
-c/w HCTZ/losartan  -monitor BP  -primarily in 120's one isolated  prior to meds
dc IVF as Na now normalized   monitor BMP
-c/w HCTZ/losartan
-c/w HCTZ/losartan  -monitor BP

## 2017-10-04 NOTE — PROGRESS NOTE ADULT - PROBLEM SELECTOR PROBLEM 3
Type 2 diabetes mellitus without complication, without long-term current use of insulin
Type 2 diabetes mellitus without complication, without long-term current use of insulin
Ketoacidosis
Type 2 diabetes mellitus without complication, without long-term current use of insulin

## 2017-11-03 ENCOUNTER — APPOINTMENT (OUTPATIENT)
Dept: PODIATRY | Facility: HOSPITAL | Age: 72
End: 2017-11-03

## 2017-11-30 ENCOUNTER — EMERGENCY (EMERGENCY)
Facility: HOSPITAL | Age: 72
LOS: 1 days | Discharge: HOME | End: 2017-11-30
Attending: EMERGENCY MEDICINE | Admitting: EMERGENCY MEDICINE
Payer: MEDICARE

## 2017-11-30 VITALS
TEMPERATURE: 98 F | RESPIRATION RATE: 18 BRPM | HEART RATE: 57 BPM | DIASTOLIC BLOOD PRESSURE: 67 MMHG | SYSTOLIC BLOOD PRESSURE: 156 MMHG | OXYGEN SATURATION: 100 %

## 2017-11-30 VITALS
HEART RATE: 58 BPM | TEMPERATURE: 97 F | SYSTOLIC BLOOD PRESSURE: 160 MMHG | RESPIRATION RATE: 18 BRPM | DIASTOLIC BLOOD PRESSURE: 62 MMHG | OXYGEN SATURATION: 100 %

## 2017-11-30 LAB
ALBUMIN SERPL ELPH-MCNC: 4.6 G/DL — SIGNIFICANT CHANGE UP (ref 3.3–5)
ALP SERPL-CCNC: 100 U/L — SIGNIFICANT CHANGE UP (ref 40–120)
ALT FLD-CCNC: 26 U/L — SIGNIFICANT CHANGE UP (ref 4–33)
AST SERPL-CCNC: 29 U/L — SIGNIFICANT CHANGE UP (ref 4–32)
BASOPHILS # BLD AUTO: 0.04 K/UL — SIGNIFICANT CHANGE UP (ref 0–0.2)
BASOPHILS NFR BLD AUTO: 0.6 % — SIGNIFICANT CHANGE UP (ref 0–2)
BILIRUB SERPL-MCNC: 0.4 MG/DL — SIGNIFICANT CHANGE UP (ref 0.2–1.2)
BUN SERPL-MCNC: 21 MG/DL — SIGNIFICANT CHANGE UP (ref 7–23)
CALCIUM SERPL-MCNC: 9.5 MG/DL — SIGNIFICANT CHANGE UP (ref 8.4–10.5)
CHLORIDE SERPL-SCNC: 104 MMOL/L — SIGNIFICANT CHANGE UP (ref 98–107)
CO2 SERPL-SCNC: 26 MMOL/L — SIGNIFICANT CHANGE UP (ref 22–31)
CREAT SERPL-MCNC: 0.83 MG/DL — SIGNIFICANT CHANGE UP (ref 0.5–1.3)
EOSINOPHIL # BLD AUTO: 0.15 K/UL — SIGNIFICANT CHANGE UP (ref 0–0.5)
EOSINOPHIL NFR BLD AUTO: 2.2 % — SIGNIFICANT CHANGE UP (ref 0–6)
GLUCOSE SERPL-MCNC: 158 MG/DL — HIGH (ref 70–99)
HCT VFR BLD CALC: 34.9 % — SIGNIFICANT CHANGE UP (ref 34.5–45)
HGB BLD-MCNC: 12.2 G/DL — SIGNIFICANT CHANGE UP (ref 11.5–15.5)
IMM GRANULOCYTES # BLD AUTO: 0.07 # — SIGNIFICANT CHANGE UP
IMM GRANULOCYTES NFR BLD AUTO: 1 % — SIGNIFICANT CHANGE UP (ref 0–1.5)
INR BLD: 0.99 — SIGNIFICANT CHANGE UP (ref 0.88–1.17)
LYMPHOCYTES # BLD AUTO: 0.94 K/UL — LOW (ref 1–3.3)
LYMPHOCYTES # BLD AUTO: 14 % — SIGNIFICANT CHANGE UP (ref 13–44)
MCHC RBC-ENTMCNC: 31.8 PG — SIGNIFICANT CHANGE UP (ref 27–34)
MCHC RBC-ENTMCNC: 35 % — SIGNIFICANT CHANGE UP (ref 32–36)
MCV RBC AUTO: 90.9 FL — SIGNIFICANT CHANGE UP (ref 80–100)
MONOCYTES # BLD AUTO: 0.34 K/UL — SIGNIFICANT CHANGE UP (ref 0–0.9)
MONOCYTES NFR BLD AUTO: 5.1 % — SIGNIFICANT CHANGE UP (ref 2–14)
NEUTROPHILS # BLD AUTO: 5.16 K/UL — SIGNIFICANT CHANGE UP (ref 1.8–7.4)
NEUTROPHILS NFR BLD AUTO: 77.1 % — HIGH (ref 43–77)
NRBC # FLD: 0 — SIGNIFICANT CHANGE UP
PLATELET # BLD AUTO: 207 K/UL — SIGNIFICANT CHANGE UP (ref 150–400)
PMV BLD: 10.3 FL — SIGNIFICANT CHANGE UP (ref 7–13)
POTASSIUM SERPL-MCNC: 4.4 MMOL/L — SIGNIFICANT CHANGE UP (ref 3.5–5.3)
POTASSIUM SERPL-SCNC: 4.4 MMOL/L — SIGNIFICANT CHANGE UP (ref 3.5–5.3)
PROT SERPL-MCNC: 7.2 G/DL — SIGNIFICANT CHANGE UP (ref 6–8.3)
PROTHROM AB SERPL-ACNC: 11.1 SEC — SIGNIFICANT CHANGE UP (ref 9.8–13.1)
RBC # BLD: 3.84 M/UL — SIGNIFICANT CHANGE UP (ref 3.8–5.2)
RBC # FLD: 12.4 % — SIGNIFICANT CHANGE UP (ref 10.3–14.5)
SODIUM SERPL-SCNC: 143 MMOL/L — SIGNIFICANT CHANGE UP (ref 135–145)
WBC # BLD: 6.7 K/UL — SIGNIFICANT CHANGE UP (ref 3.8–10.5)
WBC # FLD AUTO: 6.7 K/UL — SIGNIFICANT CHANGE UP (ref 3.8–10.5)

## 2017-11-30 PROCEDURE — 99285 EMERGENCY DEPT VISIT HI MDM: CPT | Mod: GC

## 2017-11-30 PROCEDURE — 72170 X-RAY EXAM OF PELVIS: CPT | Mod: 26

## 2017-11-30 PROCEDURE — 74176 CT ABD & PELVIS W/O CONTRAST: CPT | Mod: 26

## 2017-11-30 PROCEDURE — 71010: CPT | Mod: 26

## 2017-11-30 PROCEDURE — 72125 CT NECK SPINE W/O DYE: CPT | Mod: 26

## 2017-11-30 PROCEDURE — 71250 CT THORAX DX C-: CPT | Mod: 26

## 2017-11-30 PROCEDURE — 70450 CT HEAD/BRAIN W/O DYE: CPT | Mod: 26

## 2017-11-30 RX ORDER — IBUPROFEN 200 MG
1 TABLET ORAL
Qty: 120 | Refills: 0 | OUTPATIENT
Start: 2017-11-30 | End: 2017-12-30

## 2017-11-30 RX ORDER — SODIUM CHLORIDE 9 MG/ML
1000 INJECTION INTRAMUSCULAR; INTRAVENOUS; SUBCUTANEOUS ONCE
Qty: 0 | Refills: 0 | Status: COMPLETED | OUTPATIENT
Start: 2017-11-30 | End: 2017-11-30

## 2017-11-30 RX ORDER — OXYCODONE HYDROCHLORIDE 5 MG/1
1 TABLET ORAL
Qty: 15 | Refills: 0 | OUTPATIENT
Start: 2017-11-30

## 2017-11-30 RX ORDER — ACETAMINOPHEN 500 MG
1000 TABLET ORAL ONCE
Qty: 0 | Refills: 0 | Status: COMPLETED | OUTPATIENT
Start: 2017-11-30 | End: 2017-11-30

## 2017-11-30 RX ORDER — IBUPROFEN 200 MG
400 TABLET ORAL ONCE
Qty: 0 | Refills: 0 | Status: COMPLETED | OUTPATIENT
Start: 2017-11-30 | End: 2017-11-30

## 2017-11-30 RX ORDER — MORPHINE SULFATE 50 MG/1
4 CAPSULE, EXTENDED RELEASE ORAL ONCE
Qty: 0 | Refills: 0 | Status: DISCONTINUED | OUTPATIENT
Start: 2017-11-30 | End: 2017-11-30

## 2017-11-30 RX ORDER — OXYCODONE AND ACETAMINOPHEN 5; 325 MG/1; MG/1
1 TABLET ORAL ONCE
Qty: 0 | Refills: 0 | Status: DISCONTINUED | OUTPATIENT
Start: 2017-11-30 | End: 2017-11-30

## 2017-11-30 RX ADMIN — Medication 400 MILLIGRAM(S): at 14:11

## 2017-11-30 RX ADMIN — OXYCODONE AND ACETAMINOPHEN 1 TABLET(S): 5; 325 TABLET ORAL at 19:37

## 2017-11-30 RX ADMIN — MORPHINE SULFATE 4 MILLIGRAM(S): 50 CAPSULE, EXTENDED RELEASE ORAL at 17:59

## 2017-11-30 RX ADMIN — Medication 400 MILLIGRAM(S): at 19:37

## 2017-11-30 RX ADMIN — SODIUM CHLORIDE 1000 MILLILITER(S): 9 INJECTION INTRAMUSCULAR; INTRAVENOUS; SUBCUTANEOUS at 14:12

## 2017-11-30 RX ADMIN — Medication 1000 MILLIGRAM(S): at 14:54

## 2017-11-30 RX ADMIN — MORPHINE SULFATE 4 MILLIGRAM(S): 50 CAPSULE, EXTENDED RELEASE ORAL at 18:59

## 2017-11-30 NOTE — ED PROVIDER NOTE - CARE PLAN
Principal Discharge DX:	Fall, initial encounter Principal Discharge DX:	Thoracic spine fracture  Secondary Diagnosis:	Rib fracture  Secondary Diagnosis:	Glenoid fracture of shoulder

## 2017-11-30 NOTE — ED ADULT TRIAGE NOTE - CHIEF COMPLAINT QUOTE
pt states that she lost her footing and fell down 20 steps, states that she hit the back of her head and c/o pain to left shoulder, arm and hand.  Denies LOC, no anticoagulants

## 2017-11-30 NOTE — ED ADULT NURSE NOTE - OBJECTIVE STATEMENT
Pt rc'd to rm 1 from home c/o L shoulder pain related to recent fall today. Pt states she was walking to the stairs today and slipped and fell down about 20 stairs, hitting her head. Pt states she did not lose consciousness and was not dizzy prior to the fall. Pt hx of DM, hypothyroid. Pt denies taking blood thinners, smoking, drinking. Pt A&Ox3, pupils equal & reactive, c/o pain to the back of her head & L shoulder pain radiating to the L scapula area. Pt has no ecchymotic areas, no active bleeding, and no obvious deformities. Pt moves all extremities with a weaker hand  on the left side. Pt is not able to lift her arm laterally related to pain from the fall. C collar placed by MD, pt able to move head laterally and head to chest. NSR on monitor, VSS, respirations even & unlabored. IV 20G placed L 20G AC vein, bloods drawn & sent, will monitor

## 2017-11-30 NOTE — ED PROVIDER NOTE - PROGRESS NOTE DETAILS
cabot: Pt signed out to me.  72F coming in after falling down 20 stairs, mechanical fall.  Pan scan revealed T2-T3 transverse process fx, one T2 posterior rib fx, a L glenoid chip fx, and a chronic T7 compression fx.  All non-surgical.  Spoke with ortho, who agrees.  When pain is controlled, patient will be discharged home and will follow up with Dr. Farias (ortho spine).  She also has a breast mass, which will be evaluated with mammogram or MRI as outpatient. cabot: Pt signed out to me.  72F coming in after falling down 20 stairs, mechanical fall.  Pan scan revealed T2-T3 transverse process fx, one T2 posterior rib fx, a L glenoid chip fx, and a chronic T7 compression fx.  All non-surgical.  Spoke with ortho, who agrees.  When pain is controlled, patient will be discharged home and will follow up with Dr. Farias (ortho spine).  She also has a breast mass, which will be evaluated with mammogram or MRI as outpatient.  Will not apply sling, as she has signifcant back pain from the t-spine fx. Cabot: Patient's pain is controlled.  Discharging home.

## 2017-11-30 NOTE — ED PROVIDER NOTE - OBJECTIVE STATEMENT
72F h/o HTN, Type 2 DM and hypothyroidism with fall. Pt fell down 20 stairs one hour PTA, slipped and fell, hit back of head, and L shoulder pain, helped up. C/o LUE pain and L upper back pain. Denies AC use. 72F h/o HTN, Type 2 DM and hypothyroidism with fall. Pt fell down 20 stairs one hour PTA, slipped and fell, hit back of head, and L shoulder pain, helped up. C/o LUE pain and L upper back pain. Denies AC use.    Dr. Watson: 72F p/w L shoulder pain and L mid back pain s/p slip and fall down 20 steps (wooden at home). +hit head, no LOC, no prolonged down time, did not try walking. Not on AC.

## 2017-11-30 NOTE — ED PROVIDER NOTE - ATTENDING CONTRIBUTION TO CARE
Dr. Watson: This H&P has been written by myself in its entirety  Dr. Watson: I have personally performed a face to face bedside history and physical examination of this patient. I have discussed the history, examination, review of systems, assessment and plan of management with the resident. I have reviewed the electronic medical record and amended it to reflect my history, review of systems, physical exam, assessment and plan.

## 2017-11-30 NOTE — ED PROVIDER NOTE - PHYSICAL EXAMINATION
Dr. Watson: exam documented by me  Pt arrives without C collar, C collar placed by us, no C spine ttp  Pt log rolled with C spine immobilization

## 2017-12-02 ENCOUNTER — EMERGENCY (EMERGENCY)
Facility: HOSPITAL | Age: 72
LOS: 1 days | Discharge: HOME | End: 2017-12-02
Attending: EMERGENCY MEDICINE | Admitting: EMERGENCY MEDICINE
Payer: MEDICARE

## 2017-12-02 VITALS
HEART RATE: 64 BPM | RESPIRATION RATE: 16 BRPM | OXYGEN SATURATION: 100 % | DIASTOLIC BLOOD PRESSURE: 56 MMHG | TEMPERATURE: 98 F | SYSTOLIC BLOOD PRESSURE: 157 MMHG

## 2017-12-02 VITALS
SYSTOLIC BLOOD PRESSURE: 117 MMHG | DIASTOLIC BLOOD PRESSURE: 82 MMHG | HEART RATE: 62 BPM | TEMPERATURE: 98 F | RESPIRATION RATE: 16 BRPM | OXYGEN SATURATION: 100 %

## 2017-12-02 PROCEDURE — 99284 EMERGENCY DEPT VISIT MOD MDM: CPT

## 2017-12-02 PROCEDURE — 73030 X-RAY EXAM OF SHOULDER: CPT | Mod: 26,LT

## 2017-12-02 PROCEDURE — 73060 X-RAY EXAM OF HUMERUS: CPT | Mod: 26,LT

## 2017-12-02 RX ORDER — OXYCODONE AND ACETAMINOPHEN 5; 325 MG/1; MG/1
1 TABLET ORAL ONCE
Qty: 0 | Refills: 0 | Status: DISCONTINUED | OUTPATIENT
Start: 2017-12-02 | End: 2017-12-02

## 2017-12-02 RX ADMIN — OXYCODONE AND ACETAMINOPHEN 1 TABLET(S): 5; 325 TABLET ORAL at 13:51

## 2017-12-02 NOTE — ED PROVIDER NOTE - PROGRESS NOTE DETAILS
having received explanation as to her injuries pt now comfortable; got shoulder xrays which were negative; will follow up with spine and ortho (possible rotator cuff injury)

## 2017-12-02 NOTE — ED PROVIDER NOTE - OBJECTIVE STATEMENT
72 yr old female with recent fall (2 days ago) approx down 20 steps with thoracic spine transverse fx, glenoid rim fx, and rib fx presents with pain to left lateral neck and upper shoulder.  States meds have helped somewhat.  Denies weakness, numbness in extremities.  During hx she explains to me was not told about all her fractures and the pain they would cause.

## 2017-12-02 NOTE — ED ADULT TRIAGE NOTE - CHIEF COMPLAINT QUOTE
Pt slipped and fell down stairs Wednesday.  C/O L shoulder pain.  Was seen here Wednesday for same but pain won't go away.  Appears comfortable presently

## 2018-02-12 ENCOUNTER — APPOINTMENT (OUTPATIENT)
Dept: GYNECOLOGIC ONCOLOGY | Facility: CLINIC | Age: 73
End: 2018-02-12
Payer: MEDICARE

## 2018-02-12 VITALS
SYSTOLIC BLOOD PRESSURE: 102 MMHG | OXYGEN SATURATION: 98 % | HEART RATE: 67 BPM | WEIGHT: 193 LBS | BODY MASS INDEX: 35.51 KG/M2 | HEIGHT: 62 IN | DIASTOLIC BLOOD PRESSURE: 80 MMHG

## 2018-02-12 PROCEDURE — 99214 OFFICE O/P EST MOD 30 MIN: CPT

## 2018-04-14 NOTE — PROGRESS NOTE ADULT - SUBJECTIVE AND OBJECTIVE BOX
pt seen at bedside in NAD. dressing c/d/i to right foot  MRI questionable abscess between 2,3 no tenderness with palpation in this area and no probing to this area no erythema decreasing edema wound starting to close  applied betadine with DSD  continue dressing at home   ABX as per ID  pt should follow up in [facility] wound care center upon dsc [phone] DISPLAY PLAN FREE TEXT

## 2018-08-08 ENCOUNTER — APPOINTMENT (OUTPATIENT)
Dept: GYNECOLOGIC ONCOLOGY | Facility: CLINIC | Age: 73
End: 2018-08-08
Payer: MEDICARE

## 2018-08-08 VITALS
WEIGHT: 175.13 LBS | OXYGEN SATURATION: 97 % | SYSTOLIC BLOOD PRESSURE: 138 MMHG | BODY MASS INDEX: 32.23 KG/M2 | HEART RATE: 61 BPM | DIASTOLIC BLOOD PRESSURE: 76 MMHG | HEIGHT: 62 IN

## 2018-08-08 DIAGNOSIS — R93.8 ABNORMAL FINDINGS ON DIAGNOSTIC IMAGING OF OTHER SPECIFIED BODY STRUCTURES: ICD-10-CM

## 2018-08-08 PROCEDURE — 36415 COLL VENOUS BLD VENIPUNCTURE: CPT

## 2018-08-08 PROCEDURE — 99214 OFFICE O/P EST MOD 30 MIN: CPT

## 2018-08-10 LAB
ALBUMIN SERPL ELPH-MCNC: 4.8 G/DL
ALP BLD-CCNC: 91 U/L
ALT SERPL-CCNC: 19 U/L
ANION GAP SERPL CALC-SCNC: 21 MMOL/L
AST SERPL-CCNC: 23 U/L
BASOPHILS # BLD AUTO: 0.02 K/UL
BASOPHILS NFR BLD AUTO: 0.4 %
BILIRUB SERPL-MCNC: 0.5 MG/DL
BUN SERPL-MCNC: 27 MG/DL
CALCIUM SERPL-MCNC: 10 MG/DL
CANCER AG125 SERPL-ACNC: 24 U/ML
CEA SERPL-MCNC: 5.2 NG/ML
CHLORIDE SERPL-SCNC: 105 MMOL/L
CO2 SERPL-SCNC: 17 MMOL/L
CREAT SERPL-MCNC: 0.99 MG/DL
EOSINOPHIL # BLD AUTO: 0.18 K/UL
EOSINOPHIL NFR BLD AUTO: 3.8 %
HCT VFR BLD CALC: 36.3 %
HGB BLD-MCNC: 12.3 G/DL
IMM GRANULOCYTES NFR BLD AUTO: 0.2 %
LYMPHOCYTES # BLD AUTO: 1.62 K/UL
LYMPHOCYTES NFR BLD AUTO: 33.8 %
MAN DIFF?: NORMAL
MCHC RBC-ENTMCNC: 31.9 PG
MCHC RBC-ENTMCNC: 33.9 GM/DL
MCV RBC AUTO: 94.3 FL
MONOCYTES # BLD AUTO: 0.39 K/UL
MONOCYTES NFR BLD AUTO: 8.1 %
NEUTROPHILS # BLD AUTO: 2.58 K/UL
NEUTROPHILS NFR BLD AUTO: 53.7 %
PLATELET # BLD AUTO: 236 K/UL
POTASSIUM SERPL-SCNC: 4.9 MMOL/L
PROT SERPL-MCNC: 6.9 G/DL
RBC # BLD: 3.85 M/UL
RBC # FLD: 13.5 %
SODIUM SERPL-SCNC: 143 MMOL/L
WBC # FLD AUTO: 4.8 K/UL

## 2018-09-13 ENCOUNTER — OUTPATIENT (OUTPATIENT)
Dept: OUTPATIENT SERVICES | Facility: HOSPITAL | Age: 73
LOS: 1 days | End: 2018-09-13
Payer: MEDICARE

## 2018-09-13 ENCOUNTER — APPOINTMENT (OUTPATIENT)
Dept: CT IMAGING | Facility: HOSPITAL | Age: 73
End: 2018-09-13
Payer: MEDICARE

## 2018-09-13 PROCEDURE — 74177 CT ABD & PELVIS W/CONTRAST: CPT | Mod: 26

## 2018-09-13 PROCEDURE — 74177 CT ABD & PELVIS W/CONTRAST: CPT

## 2018-11-19 ENCOUNTER — EMERGENCY (EMERGENCY)
Facility: HOSPITAL | Age: 73
LOS: 1 days | Discharge: HOME | End: 2018-11-19
Attending: EMERGENCY MEDICINE | Admitting: EMERGENCY MEDICINE
Payer: MEDICARE

## 2018-11-19 VITALS
OXYGEN SATURATION: 100 % | RESPIRATION RATE: 16 BRPM | SYSTOLIC BLOOD PRESSURE: 159 MMHG | TEMPERATURE: 98 F | DIASTOLIC BLOOD PRESSURE: 59 MMHG | HEART RATE: 58 BPM

## 2018-11-19 VITALS
DIASTOLIC BLOOD PRESSURE: 50 MMHG | OXYGEN SATURATION: 100 % | TEMPERATURE: 98 F | SYSTOLIC BLOOD PRESSURE: 150 MMHG | RESPIRATION RATE: 15 BRPM | HEART RATE: 61 BPM

## 2018-11-19 LAB
ALBUMIN SERPL ELPH-MCNC: 4.6 G/DL — SIGNIFICANT CHANGE UP (ref 3.3–5)
ALP SERPL-CCNC: 87 U/L — SIGNIFICANT CHANGE UP (ref 40–120)
ALT FLD-CCNC: 14 U/L — SIGNIFICANT CHANGE UP (ref 4–33)
AST SERPL-CCNC: 19 U/L — SIGNIFICANT CHANGE UP (ref 4–32)
BASOPHILS # BLD AUTO: 0.03 K/UL — SIGNIFICANT CHANGE UP (ref 0–0.2)
BASOPHILS NFR BLD AUTO: 0.6 % — SIGNIFICANT CHANGE UP (ref 0–2)
BILIRUB SERPL-MCNC: 0.4 MG/DL — SIGNIFICANT CHANGE UP (ref 0.2–1.2)
BUN SERPL-MCNC: 28 MG/DL — HIGH (ref 7–23)
CALCIUM SERPL-MCNC: 10.2 MG/DL — SIGNIFICANT CHANGE UP (ref 8.4–10.5)
CHLORIDE SERPL-SCNC: 105 MMOL/L — SIGNIFICANT CHANGE UP (ref 98–107)
CO2 SERPL-SCNC: 24 MMOL/L — SIGNIFICANT CHANGE UP (ref 22–31)
CREAT SERPL-MCNC: 0.85 MG/DL — SIGNIFICANT CHANGE UP (ref 0.5–1.3)
EOSINOPHIL # BLD AUTO: 0.18 K/UL — SIGNIFICANT CHANGE UP (ref 0–0.5)
EOSINOPHIL NFR BLD AUTO: 3.4 % — SIGNIFICANT CHANGE UP (ref 0–6)
GLUCOSE SERPL-MCNC: 103 MG/DL — HIGH (ref 70–99)
HCT VFR BLD CALC: 33.5 % — LOW (ref 34.5–45)
HGB BLD-MCNC: 11.4 G/DL — LOW (ref 11.5–15.5)
IMM GRANULOCYTES # BLD AUTO: 0.01 # — SIGNIFICANT CHANGE UP
IMM GRANULOCYTES NFR BLD AUTO: 0.2 % — SIGNIFICANT CHANGE UP (ref 0–1.5)
LYMPHOCYTES # BLD AUTO: 1.77 K/UL — SIGNIFICANT CHANGE UP (ref 1–3.3)
LYMPHOCYTES # BLD AUTO: 33.5 % — SIGNIFICANT CHANGE UP (ref 13–44)
MCHC RBC-ENTMCNC: 32 PG — SIGNIFICANT CHANGE UP (ref 27–34)
MCHC RBC-ENTMCNC: 34 % — SIGNIFICANT CHANGE UP (ref 32–36)
MCV RBC AUTO: 94.1 FL — SIGNIFICANT CHANGE UP (ref 80–100)
MONOCYTES # BLD AUTO: 0.38 K/UL — SIGNIFICANT CHANGE UP (ref 0–0.9)
MONOCYTES NFR BLD AUTO: 7.2 % — SIGNIFICANT CHANGE UP (ref 2–14)
NEUTROPHILS # BLD AUTO: 2.91 K/UL — SIGNIFICANT CHANGE UP (ref 1.8–7.4)
NEUTROPHILS NFR BLD AUTO: 55.1 % — SIGNIFICANT CHANGE UP (ref 43–77)
NRBC # FLD: 0 — SIGNIFICANT CHANGE UP
PLATELET # BLD AUTO: 210 K/UL — SIGNIFICANT CHANGE UP (ref 150–400)
PMV BLD: 10 FL — SIGNIFICANT CHANGE UP (ref 7–13)
POTASSIUM SERPL-MCNC: 4.2 MMOL/L — SIGNIFICANT CHANGE UP (ref 3.5–5.3)
POTASSIUM SERPL-SCNC: 4.2 MMOL/L — SIGNIFICANT CHANGE UP (ref 3.5–5.3)
PROT SERPL-MCNC: 7 G/DL — SIGNIFICANT CHANGE UP (ref 6–8.3)
RBC # BLD: 3.56 M/UL — LOW (ref 3.8–5.2)
RBC # FLD: 12.3 % — SIGNIFICANT CHANGE UP (ref 10.3–14.5)
SODIUM SERPL-SCNC: 140 MMOL/L — SIGNIFICANT CHANGE UP (ref 135–145)
TROPONIN T, HIGH SENSITIVITY: 10 NG/L — SIGNIFICANT CHANGE UP (ref ?–14)
TROPONIN T, HIGH SENSITIVITY: 9 NG/L — SIGNIFICANT CHANGE UP (ref ?–14)
WBC # BLD: 5.28 K/UL — SIGNIFICANT CHANGE UP (ref 3.8–10.5)
WBC # FLD AUTO: 5.28 K/UL — SIGNIFICANT CHANGE UP (ref 3.8–10.5)

## 2018-11-19 PROCEDURE — 71046 X-RAY EXAM CHEST 2 VIEWS: CPT | Mod: 26

## 2018-11-19 PROCEDURE — 99284 EMERGENCY DEPT VISIT MOD MDM: CPT | Mod: GC

## 2018-11-19 NOTE — ED PROVIDER NOTE - PROGRESS NOTE DETAILS
Resident: Sathish Palomino – Pt was re-evaluated at bedside, VSS, feeling better overall. Results were discussed with patient as well as return precautions and follow up plan with PCP and/or specialist. Time was taken to answer any questions that the patient had before providing them with discharge paperwork.

## 2018-11-19 NOTE — ED ADULT TRIAGE NOTE - CHIEF COMPLAINT QUOTE
pt comes to ED for chest pain that started today. pt states it is a pinching pain. pt appears comfortable NAD

## 2018-11-19 NOTE — ED PROVIDER NOTE - ATTENDING CONTRIBUTION TO CARE
Attending note:   After face to face evaluation of this patient, I concur with above noted hx, pe, and care plan for this patient.  72 y/o F with dm, htn with c/o sticking chest pain on, off for 18 hours.    No pain at present, no ekg changes.    Evaluation in progress

## 2018-11-19 NOTE — ED ADULT NURSE NOTE - OBJECTIVE STATEMENT
pt arrives to room #27 alert and oriented, states that earlier today she developed pinching pain in her chest. pt states she had nausea before pain. pt states after an hour it resolved. pt denies complaints at this time.   20 g placed to left AC labs drawn and sent.

## 2019-01-24 NOTE — ED PROVIDER NOTE - PSYCHIATRIC, MLM
Alert and oriented to person, place, time/situation. normal mood and affect. no apparent risk to self or others.
.

## 2019-02-01 ENCOUNTER — OUTPATIENT (OUTPATIENT)
Dept: OUTPATIENT SERVICES | Facility: HOSPITAL | Age: 74
LOS: 1 days | End: 2019-02-01
Payer: MEDICARE

## 2019-02-01 PROCEDURE — G9001: CPT

## 2019-02-13 ENCOUNTER — INPATIENT (INPATIENT)
Facility: HOSPITAL | Age: 74
LOS: 5 days | Discharge: HOME | DRG: 337 | End: 2019-02-19
Attending: SURGERY | Admitting: SURGERY
Payer: MEDICARE

## 2019-02-13 VITALS
DIASTOLIC BLOOD PRESSURE: 78 MMHG | SYSTOLIC BLOOD PRESSURE: 169 MMHG | TEMPERATURE: 98 F | WEIGHT: 171.96 LBS | OXYGEN SATURATION: 100 % | HEART RATE: 58 BPM | RESPIRATION RATE: 18 BRPM | HEIGHT: 64 IN

## 2019-02-13 DIAGNOSIS — I10 ESSENTIAL (PRIMARY) HYPERTENSION: ICD-10-CM

## 2019-02-13 DIAGNOSIS — Z90.710 ACQUIRED ABSENCE OF BOTH CERVIX AND UTERUS: ICD-10-CM

## 2019-02-13 DIAGNOSIS — E03.9 HYPOTHYROIDISM, UNSPECIFIED: ICD-10-CM

## 2019-02-13 DIAGNOSIS — K43.6 OTHER AND UNSPECIFIED VENTRAL HERNIA WITH OBSTRUCTION, WITHOUT GANGRENE: ICD-10-CM

## 2019-02-13 DIAGNOSIS — E11.9 TYPE 2 DIABETES MELLITUS WITHOUT COMPLICATIONS: ICD-10-CM

## 2019-02-13 DIAGNOSIS — E66.9 OBESITY, UNSPECIFIED: ICD-10-CM

## 2019-02-13 DIAGNOSIS — K66.0 PERITONEAL ADHESIONS (POSTPROCEDURAL) (POSTINFECTION): ICD-10-CM

## 2019-02-13 DIAGNOSIS — Z90.49 ACQUIRED ABSENCE OF OTHER SPECIFIED PARTS OF DIGESTIVE TRACT: ICD-10-CM

## 2019-02-13 DIAGNOSIS — K42.9 UMBILICAL HERNIA WITHOUT OBSTRUCTION OR GANGRENE: ICD-10-CM

## 2019-02-13 DIAGNOSIS — R10.9 UNSPECIFIED ABDOMINAL PAIN: ICD-10-CM

## 2019-02-13 LAB
ALBUMIN SERPL ELPH-MCNC: 4.9 G/DL — SIGNIFICANT CHANGE UP (ref 3.3–5)
ALP SERPL-CCNC: 106 U/L — SIGNIFICANT CHANGE UP (ref 40–120)
ALT FLD-CCNC: 26 U/L — SIGNIFICANT CHANGE UP (ref 10–45)
ANION GAP SERPL CALC-SCNC: 11 MMOL/L — SIGNIFICANT CHANGE UP (ref 5–17)
APPEARANCE UR: CLEAR — SIGNIFICANT CHANGE UP
APTT BLD: 32.7 SEC — SIGNIFICANT CHANGE UP (ref 27.5–36.3)
AST SERPL-CCNC: 25 U/L — SIGNIFICANT CHANGE UP (ref 10–40)
BACTERIA # UR AUTO: PRESENT /HPF
BASOPHILS # BLD AUTO: 0.02 K/UL — SIGNIFICANT CHANGE UP (ref 0–0.2)
BASOPHILS NFR BLD AUTO: 0.2 % — SIGNIFICANT CHANGE UP (ref 0–2)
BILIRUB SERPL-MCNC: 0.7 MG/DL — SIGNIFICANT CHANGE UP (ref 0.2–1.2)
BILIRUB UR-MCNC: NEGATIVE — SIGNIFICANT CHANGE UP
BUN SERPL-MCNC: 22 MG/DL — SIGNIFICANT CHANGE UP (ref 7–23)
CALCIUM SERPL-MCNC: 10.2 MG/DL — SIGNIFICANT CHANGE UP (ref 8.4–10.5)
CHLORIDE SERPL-SCNC: 103 MMOL/L — SIGNIFICANT CHANGE UP (ref 96–108)
CO2 SERPL-SCNC: 24 MMOL/L — SIGNIFICANT CHANGE UP (ref 22–31)
COLOR SPEC: YELLOW — SIGNIFICANT CHANGE UP
COMMENT - URINE: SIGNIFICANT CHANGE UP
CREAT SERPL-MCNC: 0.84 MG/DL — SIGNIFICANT CHANGE UP (ref 0.5–1.3)
DIFF PNL FLD: NEGATIVE — SIGNIFICANT CHANGE UP
EOSINOPHIL # BLD AUTO: 0.14 K/UL — SIGNIFICANT CHANGE UP (ref 0–0.5)
EOSINOPHIL NFR BLD AUTO: 1.7 % — SIGNIFICANT CHANGE UP (ref 0–6)
EPI CELLS # UR: SIGNIFICANT CHANGE UP /HPF (ref 0–5)
GLUCOSE BLDC GLUCOMTR-MCNC: 78 MG/DL — SIGNIFICANT CHANGE UP (ref 70–99)
GLUCOSE BLDC GLUCOMTR-MCNC: 92 MG/DL — SIGNIFICANT CHANGE UP (ref 70–99)
GLUCOSE SERPL-MCNC: 134 MG/DL — HIGH (ref 70–99)
GLUCOSE UR QL: NEGATIVE — SIGNIFICANT CHANGE UP
HCT VFR BLD CALC: 38.2 % — SIGNIFICANT CHANGE UP (ref 34.5–45)
HGB BLD-MCNC: 13 G/DL — SIGNIFICANT CHANGE UP (ref 11.5–15.5)
IMM GRANULOCYTES NFR BLD AUTO: 0.4 % — SIGNIFICANT CHANGE UP (ref 0–1.5)
INR BLD: 1.04 — SIGNIFICANT CHANGE UP (ref 0.88–1.16)
KETONES UR-MCNC: NEGATIVE — SIGNIFICANT CHANGE UP
LEUKOCYTE ESTERASE UR-ACNC: NEGATIVE — SIGNIFICANT CHANGE UP
LYMPHOCYTES # BLD AUTO: 1.19 K/UL — SIGNIFICANT CHANGE UP (ref 1–3.3)
LYMPHOCYTES # BLD AUTO: 14.2 % — SIGNIFICANT CHANGE UP (ref 13–44)
MCHC RBC-ENTMCNC: 32.6 PG — SIGNIFICANT CHANGE UP (ref 27–34)
MCHC RBC-ENTMCNC: 34 GM/DL — SIGNIFICANT CHANGE UP (ref 32–36)
MCV RBC AUTO: 95.7 FL — SIGNIFICANT CHANGE UP (ref 80–100)
MONOCYTES # BLD AUTO: 0.55 K/UL — SIGNIFICANT CHANGE UP (ref 0–0.9)
MONOCYTES NFR BLD AUTO: 6.5 % — SIGNIFICANT CHANGE UP (ref 2–14)
NEUTROPHILS # BLD AUTO: 6.47 K/UL — SIGNIFICANT CHANGE UP (ref 1.8–7.4)
NEUTROPHILS NFR BLD AUTO: 77 % — SIGNIFICANT CHANGE UP (ref 43–77)
NITRITE UR-MCNC: POSITIVE
NRBC # BLD: 0 /100 WBCS — SIGNIFICANT CHANGE UP (ref 0–0)
PH UR: 6 — SIGNIFICANT CHANGE UP (ref 5–8)
PLATELET # BLD AUTO: 216 K/UL — SIGNIFICANT CHANGE UP (ref 150–400)
POTASSIUM SERPL-MCNC: 4.5 MMOL/L — SIGNIFICANT CHANGE UP (ref 3.5–5.3)
POTASSIUM SERPL-SCNC: 4.5 MMOL/L — SIGNIFICANT CHANGE UP (ref 3.5–5.3)
PROT SERPL-MCNC: 7.5 G/DL — SIGNIFICANT CHANGE UP (ref 6–8.3)
PROT UR-MCNC: NEGATIVE MG/DL — SIGNIFICANT CHANGE UP
PROTHROM AB SERPL-ACNC: 11.8 SEC — SIGNIFICANT CHANGE UP (ref 10–12.9)
RBC # BLD: 3.99 M/UL — SIGNIFICANT CHANGE UP (ref 3.8–5.2)
RBC # FLD: 12.8 % — SIGNIFICANT CHANGE UP (ref 10.3–14.5)
RBC CASTS # UR COMP ASSIST: < 5 /HPF — SIGNIFICANT CHANGE UP
SODIUM SERPL-SCNC: 138 MMOL/L — SIGNIFICANT CHANGE UP (ref 135–145)
SP GR SPEC: 1.01 — SIGNIFICANT CHANGE UP (ref 1–1.03)
UROBILINOGEN FLD QL: 0.2 E.U./DL — SIGNIFICANT CHANGE UP
WBC # BLD: 8.4 K/UL — SIGNIFICANT CHANGE UP (ref 3.8–10.5)
WBC # FLD AUTO: 8.4 K/UL — SIGNIFICANT CHANGE UP (ref 3.8–10.5)
WBC UR QL: < 5 /HPF — SIGNIFICANT CHANGE UP

## 2019-02-13 PROCEDURE — 93010 ELECTROCARDIOGRAM REPORT: CPT

## 2019-02-13 PROCEDURE — 71046 X-RAY EXAM CHEST 2 VIEWS: CPT | Mod: 26

## 2019-02-13 PROCEDURE — 74177 CT ABD & PELVIS W/CONTRAST: CPT | Mod: 26

## 2019-02-13 PROCEDURE — 99285 EMERGENCY DEPT VISIT HI MDM: CPT | Mod: 25

## 2019-02-13 PROCEDURE — 71045 X-RAY EXAM CHEST 1 VIEW: CPT | Mod: 26,59

## 2019-02-13 RX ORDER — SODIUM CHLORIDE 9 MG/ML
1000 INJECTION, SOLUTION INTRAVENOUS
Qty: 0 | Refills: 0 | Status: DISCONTINUED | OUTPATIENT
Start: 2019-02-13 | End: 2019-02-14

## 2019-02-13 RX ORDER — SODIUM CHLORIDE 9 MG/ML
1000 INJECTION INTRAMUSCULAR; INTRAVENOUS; SUBCUTANEOUS ONCE
Qty: 0 | Refills: 0 | Status: COMPLETED | OUTPATIENT
Start: 2019-02-13 | End: 2019-02-13

## 2019-02-13 RX ORDER — LINAGLIPTIN AND METFORMIN HYDROCHLORIDE 2.5; 85 MG/1; MG/1
1 TABLET, FILM COATED ORAL
Qty: 0 | Refills: 0 | COMMUNITY

## 2019-02-13 RX ORDER — ONDANSETRON 8 MG/1
4 TABLET, FILM COATED ORAL EVERY 6 HOURS
Qty: 0 | Refills: 0 | Status: DISCONTINUED | OUTPATIENT
Start: 2019-02-13 | End: 2019-02-14

## 2019-02-13 RX ORDER — IOHEXOL 300 MG/ML
30 INJECTION, SOLUTION INTRAVENOUS ONCE
Qty: 0 | Refills: 0 | Status: COMPLETED | OUTPATIENT
Start: 2019-02-13 | End: 2019-02-13

## 2019-02-13 RX ORDER — SODIUM CHLORIDE 9 MG/ML
1000 INJECTION, SOLUTION INTRAVENOUS
Qty: 0 | Refills: 0 | Status: DISCONTINUED | OUTPATIENT
Start: 2019-02-13 | End: 2019-02-13

## 2019-02-13 RX ORDER — DEXTROSE 50 % IN WATER 50 %
25 SYRINGE (ML) INTRAVENOUS ONCE
Qty: 0 | Refills: 0 | Status: DISCONTINUED | OUTPATIENT
Start: 2019-02-13 | End: 2019-02-14

## 2019-02-13 RX ORDER — DEXTROSE 50 % IN WATER 50 %
15 SYRINGE (ML) INTRAVENOUS ONCE
Qty: 0 | Refills: 0 | Status: DISCONTINUED | OUTPATIENT
Start: 2019-02-13 | End: 2019-02-14

## 2019-02-13 RX ORDER — SODIUM CHLORIDE 9 MG/ML
1000 INJECTION INTRAMUSCULAR; INTRAVENOUS; SUBCUTANEOUS
Qty: 0 | Refills: 0 | Status: DISCONTINUED | OUTPATIENT
Start: 2019-02-13 | End: 2019-02-14

## 2019-02-13 RX ORDER — LEVOTHYROXINE SODIUM 125 MCG
33 TABLET ORAL AT BEDTIME
Qty: 0 | Refills: 0 | Status: DISCONTINUED | OUTPATIENT
Start: 2019-02-13 | End: 2019-02-14

## 2019-02-13 RX ORDER — DEXTROSE 50 % IN WATER 50 %
12.5 SYRINGE (ML) INTRAVENOUS ONCE
Qty: 0 | Refills: 0 | Status: DISCONTINUED | OUTPATIENT
Start: 2019-02-13 | End: 2019-02-14

## 2019-02-13 RX ORDER — GLUCAGON INJECTION, SOLUTION 0.5 MG/.1ML
1 INJECTION, SOLUTION SUBCUTANEOUS ONCE
Qty: 0 | Refills: 0 | Status: DISCONTINUED | OUTPATIENT
Start: 2019-02-13 | End: 2019-02-14

## 2019-02-13 RX ORDER — LEVOTHYROXINE SODIUM 125 MCG
1 TABLET ORAL
Qty: 0 | Refills: 0 | COMMUNITY

## 2019-02-13 RX ORDER — INSULIN LISPRO 100/ML
VIAL (ML) SUBCUTANEOUS
Qty: 0 | Refills: 0 | Status: DISCONTINUED | OUTPATIENT
Start: 2019-02-13 | End: 2019-02-14

## 2019-02-13 RX ORDER — ONDANSETRON 8 MG/1
4 TABLET, FILM COATED ORAL ONCE
Qty: 0 | Refills: 0 | Status: COMPLETED | OUTPATIENT
Start: 2019-02-13 | End: 2019-02-13

## 2019-02-13 RX ORDER — HEPARIN SODIUM 5000 [USP'U]/ML
5000 INJECTION INTRAVENOUS; SUBCUTANEOUS EVERY 8 HOURS
Qty: 0 | Refills: 0 | Status: DISCONTINUED | OUTPATIENT
Start: 2019-02-13 | End: 2019-02-14

## 2019-02-13 RX ORDER — GLIMEPIRIDE 1 MG
1 TABLET ORAL
Qty: 0 | Refills: 0 | COMMUNITY

## 2019-02-13 RX ORDER — ACETAMINOPHEN 500 MG
1000 TABLET ORAL ONCE
Qty: 0 | Refills: 0 | Status: COMPLETED | OUTPATIENT
Start: 2019-02-13 | End: 2019-02-13

## 2019-02-13 RX ORDER — HYDROMORPHONE HYDROCHLORIDE 2 MG/ML
0.5 INJECTION INTRAMUSCULAR; INTRAVENOUS; SUBCUTANEOUS ONCE
Qty: 0 | Refills: 0 | Status: DISCONTINUED | OUTPATIENT
Start: 2019-02-13 | End: 2019-02-13

## 2019-02-13 RX ADMIN — HYDROMORPHONE HYDROCHLORIDE 0.5 MILLIGRAM(S): 2 INJECTION INTRAMUSCULAR; INTRAVENOUS; SUBCUTANEOUS at 10:50

## 2019-02-13 RX ADMIN — HEPARIN SODIUM 5000 UNIT(S): 5000 INJECTION INTRAVENOUS; SUBCUTANEOUS at 15:01

## 2019-02-13 RX ADMIN — HYDROMORPHONE HYDROCHLORIDE 0.5 MILLIGRAM(S): 2 INJECTION INTRAMUSCULAR; INTRAVENOUS; SUBCUTANEOUS at 10:59

## 2019-02-13 RX ADMIN — HEPARIN SODIUM 5000 UNIT(S): 5000 INJECTION INTRAVENOUS; SUBCUTANEOUS at 22:00

## 2019-02-13 RX ADMIN — SODIUM CHLORIDE 1000 MILLILITER(S): 9 INJECTION INTRAMUSCULAR; INTRAVENOUS; SUBCUTANEOUS at 13:11

## 2019-02-13 RX ADMIN — Medication 400 MILLIGRAM(S): at 22:40

## 2019-02-13 RX ADMIN — IOHEXOL 30 MILLILITER(S): 300 INJECTION, SOLUTION INTRAVENOUS at 10:50

## 2019-02-13 RX ADMIN — Medication 1000 MILLIGRAM(S): at 23:30

## 2019-02-13 RX ADMIN — ONDANSETRON 4 MILLIGRAM(S): 8 TABLET, FILM COATED ORAL at 10:50

## 2019-02-13 RX ADMIN — Medication 33 MICROGRAM(S): at 22:39

## 2019-02-13 RX ADMIN — SODIUM CHLORIDE 125 MILLILITER(S): 9 INJECTION, SOLUTION INTRAVENOUS at 14:41

## 2019-02-13 RX ADMIN — SODIUM CHLORIDE 2000 MILLILITER(S): 9 INJECTION INTRAMUSCULAR; INTRAVENOUS; SUBCUTANEOUS at 10:50

## 2019-02-13 NOTE — CONSULT NOTE ADULT - ASSESSMENT
71 year old, PMH of Hypothyroid, HTN, DM, PSH of Hysterectomy, appendectomy, cholecystectomy, present with abdominal pain of 9 days duration and constipation, 2 reducible ventral hernia on exam.    Recs:  - CT Abdomen/Pelvis with IV and oral contrast to rule out SBO 2/2 Adhesions Vs hernia (less likely)  - Please obtain Lactate level  - NPO  - IV fluid   - will follow

## 2019-02-13 NOTE — PROGRESS NOTE ADULT - ASSESSMENT
71F PMHx Hypothyroid, HTN, DM, with infraumbilical hernia a/w SBO.    continue current plan  IV tylenol once for headache    pain/nausea control without narcotics  home meds as appropriate  IS/OOB  NPO, IVF, NGT  SCDs, HSQ

## 2019-02-13 NOTE — ED PROVIDER NOTE - PROGRESS NOTE DETAILS
Surg consulted since pt sent by Dr Ag for eval and will eval. CT w + sbo - rad believes related to her hernias but surg concerned more for adhesions since both are fully reducible.  Pt tba to regional bed; surg planning ng and monitoring, no current plan for or at this time.

## 2019-02-13 NOTE — H&P ADULT - NSHPPHYSICALEXAM_GEN_ALL_CORE
General: NAD, resting comfortably in bed  Pulmonary: Nonlabored breathing, no respiratory distress  Abdominal:  Midline laparotomy scar well healed  2 incisional hernias, containing bowel, both are reducible with mild tenderness and no skin changes  Abdomen is soft, ND, mild epigastric tenderness, no peritoneal signs  Intact inguinal hernia orifices

## 2019-02-13 NOTE — ED PROVIDER NOTE - DIAGNOSTIC INTERPRETATION
ER Physician:  Hannah Director  CHEST XRAY INTERPRETATION: lungs clear, heart shadow normal, bony structures intact , afl on visible bowel loops

## 2019-02-13 NOTE — H&P ADULT - ASSESSMENT
71 year old, PMH of Hypothyroid, HTN, DM, PSH of Hysterectomy, appendectomy, cholecystectomy, present with abdominal pain of 9 days duration and constipation, 2 reducible incisional hernia on examination with tenderness over the infraumbilical hernia, no diffuse tenderness or peritoneal signs, AFVSS, no leukocytosis,   CT scan shows dilated bowel loops possible transition point at the infraumbilical hernia although it's reducible on clinical exam, admitted with small bowel obstruction for conservative management     Plan:  - Admit to Dr.Manolas Galileo  - NPO  - IV fluid  - NG tube decompression  - Serial abdominal exam  - Heparin Sc for DVT ppx  - Pain/Nausea control  - Abdominal Xray 2 views tomorrow morning   - Seen and examined with the Chief resident 71 year old, PMH of Hypothyroid, HTN, DM, PSH of DIMPLE, BSO, omentectomy and lymph node dissection, appendectomy, cholecystectomy, present with abdominal pain of 9 days duration and constipation, 2 reducible incisional hernia on examination with tenderness over the infraumbilical hernia, no diffuse tenderness or peritoneal signs, AFVSS, no leukocytosis,   CT scan shows dilated bowel loops possible transition point at the infraumbilical hernia although it's reducible on clinical exam, admitted with small bowel obstruction for conservative management     Plan:  - Admit to Dr.Manolas Galileo  - NPO  - IV fluid  - NG tube decompression  - Serial abdominal exam  - Heparin Sc for DVT ppx  - Pain/Nausea control  - Abdominal Xray 2 views tomorrow morning   - Seen and examined with the Chief resident

## 2019-02-13 NOTE — H&P ADULT - NSHPLABSRESULTS_GEN_ALL_CORE
13.0   8.40  )-----------( 216      ( 13 Feb 2019 09:29 )             38.2     02-13    138  |  103  |  22  ----------------------------<  134<H>  4.5   |  24  |  0.84    Ca    10.2      13 Feb 2019 09:29    TPro  7.5  /  Alb  4.9  /  TBili  0.7  /  DBili  x   /  AST  25  /  ALT  26  /  AlkPhos  106  02-13

## 2019-02-13 NOTE — ED ADULT TRIAGE NOTE - CHIEF COMPLAINT QUOTE
Patient c/o abdominal pain , nausea and constipation for 3 days . Denies any vomiting , dysuria , fever nor chills . Was sent by Dr. garcia for evaluation .

## 2019-02-13 NOTE — ED PROVIDER NOTE - OBJECTIVE STATEMENT
75 yo female h/o htn, dm, hypothyroid, s/p hysterectomy, appendectomy, cholecystectomy, ventral hernia x 2 c/o 1 wk 7/10 abd pain w n, anorexia, constipation.  + small bm this am, no flatus, no abd distension.  Pain aching, constant.  No meds for relief at home.  No h/o similar.  No h/o sbo.  No dysuria, hematuria.  No h/o diverticulosis, reports nl colonoscopy 2 yr ago.  No fever.  Pt notes cough w/o sob or other associated uri sx but + white phlegm x several days and also noted seconds of L sided cp since being in ed but no cp prior to today's episode and no cp now.

## 2019-02-13 NOTE — PROGRESS NOTE ADULT - SUBJECTIVE AND OBJECTIVE BOX
SERIAL ABDOMINAL EXAM NOTE     SUBJECTIVE: This evening, she feels well; she complains of a mild headache. No nausea or vomiting. Abdominal pain unchanged. No flatus or BMs.    heparin  Injectable 5000 Unit(s) SubCutaneous every 8 hours      Vital Signs Last 24 Hrs  T(C): 36.1 (2019 17:02), Max: 36.7 (2019 14:23)  T(F): 97 (2019 17:02), Max: 98.1 (2019 14:23)  HR: 53 (2019 17:02) (53 - 65)  BP: 148/59 (2019 17:02) (125/65 - 169/78)  BP(mean): --  RR: 18 (2019 17:02) (18 - 18)  SpO2: 98% (2019 17:02) (98% - 100%)  I&O's Detail    2019 07:01  -  2019 22:06  --------------------------------------------------------  IN:    sodium chloride 0.9%.: 375 mL  Total IN: 375 mL    OUT:    Nasoenteral Tube: 125 mL    Voided: 400 mL  Total OUT: 525 mL    Total NET: -150 mL          General: NAD, resting comfortably in bed  HEENT: NGT in place with minimal clear output in tube; canister with tan output  Pulm: Nonlabored breathing, no respiratory distress  Abd: soft, mild LUQ/LLQ tenderness, no rebound, no guarding, mildly distended and resonant to percussion  Extrem: WWP, no edema, SCDs in place, no calf tenderness        LABS:                        13.0   8.40  )-----------( 216      ( 2019 09:29 )             38.2     02-13    138  |  103  |  22  ----------------------------<  134<H>  4.5   |  24  |  0.84    Ca    10.2      2019 09:29    TPro  7.5  /  Alb  4.9  /  TBili  0.7  /  DBili  x   /  AST  25  /  ALT  26  /  AlkPhos  106  02-13    PT/INR - ( 2019 09:29 )   PT: 11.8 sec;   INR: 1.04          PTT - ( 2019 09:29 )  PTT:32.7 sec  Urinalysis Basic - ( 2019 11:06 )    Color: Yellow / Appearance: Clear / S.015 / pH: x  Gluc: x / Ketone: NEGATIVE  / Bili: Negative / Urobili: 0.2 E.U./dL   Blood: x / Protein: NEGATIVE mg/dL / Nitrite: POSITIVE   Leuk Esterase: NEGATIVE / RBC: < 5 /HPF / WBC < 5 /HPF   Sq Epi: x / Non Sq Epi: 0-5 /HPF / Bacteria: Present /HPF

## 2019-02-13 NOTE — ED PROVIDER NOTE - CLINICAL SUMMARY MEDICAL DECISION MAKING FREE TEXT BOX
Pt c/o abd pain w ttp epigastric, umbilical, llq, constipation, n w/o distended abd on exam.  ?partial obstruction, diverticulitis, hernia related pain, less concern for pud, uti/pyelo/stone.  Plan labs, pain/n meds, ct abd, ua; reassess.

## 2019-02-13 NOTE — CONSULT NOTE ADULT - SUBJECTIVE AND OBJECTIVE BOX
71 year old, PMH of Hypothyroid, HTN, DM, PSH of Hysterectomy, appendectomy, cholecystectomy, present with abdominal pain of 9 days duration, jeffery-umbilical, gradual in onset, severe 8/10, constant, no radiation, she reports pain at the 2 ventral hernia but she doesn't think they are bigger in size but she never try to reduce them, pain is associated with bloating and constipation of 7 days duration, patient had BM everyday at baseline but during last week she had a BM every 2-3 days, last BM today, which was hard, she didn't pass flatus for the past week, she denies any nausea or vomiting, no hematuria or dysuria, no fever.        Vital Signs Last 24 Hrs  T(C): 36.4 (2019 09:08), Max: 36.4 (2019 09:08)  T(F): 97.5 (2019 09:08), Max: 97.5 (2019 09:08)  HR: 58 (2019 09:08) (58 - 58)  BP: 169/78 (2019 09:08) (169/78 - 169/78)  BP(mean): --  RR: 18 (2019 09:08) (18 - 18)  SpO2: 100% (2019 09:08) (100% - 100%)    Physical Exam:  General: NAD, resting comfortably in bed  Pulmonary: Nonlabored breathing, no respiratory distress  Abdominal:  Midline laparotomy scar well healed  2 ventral/incision hernia both are reducible with mild tenderness and no skin changes  Abdomen is soft, ND, mild epigastric tenderness, no peritoneal signs  Intact inguinal hernia orifices       LABS:                        13.0   8.40  )-----------( 216      ( 2019 09:29 )             38.2     02-    138  |  103  |  22  ----------------------------<  134<H>  4.5   |  24  |  0.84    Ca    10.2      2019 09:29    TPro  7.5  /  Alb  4.9  /  TBili  0.7  /  DBili  x   /  AST  25  /  ALT  26  /  AlkPhos  106  02-13    PT/INR - ( 2019 09:29 )   PT: 11.8 sec;   INR: 1.04          PTT - ( 2019 09:29 )  PTT:32.7 sec  CAPILLARY BLOOD GLUCOSE        Urinalysis Basic - ( 2019 11:06 )    Color: Yellow / Appearance: Clear / S.015 / pH: x  Gluc: x / Ketone: NEGATIVE  / Bili: Negative / Urobili: 0.2 E.U./dL   Blood: x / Protein: NEGATIVE mg/dL / Nitrite: POSITIVE   Leuk Esterase: NEGATIVE / RBC: x / WBC x   Sq Epi: x / Non Sq Epi: x / Bacteria: x      LIVER FUNCTIONS - ( 2019 09:29 )  Alb: 4.9 g/dL / Pro: 7.5 g/dL / ALK PHOS: 106 U/L / ALT: 26 U/L / AST: 25 U/L / GGT: x

## 2019-02-13 NOTE — H&P ADULT - HISTORY OF PRESENT ILLNESS
71 year old, PMH of Hypothyroid, HTN, DM, PSH of Hysterectomy, appendectomy, cholecystectomy, present with abdominal pain of 9 days duration, jeffery-umbilical, gradual in onset, severe 8/10, constant, no radiation, she reports pain at the 2 ventral hernia but she doesn't think they are bigger in size but she never try to reduce them, pain is associated with bloating and constipation of 7 days duration, patient had BM everyday at baseline but during last week she had a BM every 2-3 days, last BM today, which was hard, she didn't pass flatus for the past week, she denies any nausea or vomiting, no hematuria or dysuria, no fever. 71 year old, PMH of Hypothyroid, HTN, DM, PSH of DIMPLE, BSO, omentectomy and lymph node dissection, appendectomy, cholecystectomy, present with abdominal pain of 9 days duration, jeffery-umbilical, gradual in onset, severe 8/10, constant, no radiation, she reports pain at the 2 ventral hernia but she doesn't think they are bigger in size but she never try to reduce them, pain is associated with bloating and constipation of 7 days duration, patient had BM everyday at baseline but during last week she had a BM every 2-3 days, last BM today, which was hard, she didn't pass flatus for the past week, she denies any nausea or vomiting, no hematuria or dysuria, no fever.

## 2019-02-13 NOTE — ED ADULT TRIAGE NOTE - PAIN: PRESENCE, MLM
Spoke with the pt and scheduled his fasting labs, for tomorrow.  Pt has an appt on 3/1/19.  Patient verbalized understandings.   complains of pain/discomfort

## 2019-02-13 NOTE — ED PROVIDER NOTE - GASTROINTESTINAL, MLM
Abdomen soft, hypoactive bs, non distended, ttp epigastric, umbilical, llq, + reducible ventral hernia x 2, no guarding., no cvat

## 2019-02-14 LAB
ANION GAP SERPL CALC-SCNC: 13 MMOL/L — SIGNIFICANT CHANGE UP (ref 5–17)
BLD GP AB SCN SERPL QL: NEGATIVE — SIGNIFICANT CHANGE UP
BUN SERPL-MCNC: 18 MG/DL — SIGNIFICANT CHANGE UP (ref 7–23)
CALCIUM SERPL-MCNC: 9.9 MG/DL — SIGNIFICANT CHANGE UP (ref 8.4–10.5)
CHLORIDE SERPL-SCNC: 104 MMOL/L — SIGNIFICANT CHANGE UP (ref 96–108)
CO2 SERPL-SCNC: 23 MMOL/L — SIGNIFICANT CHANGE UP (ref 22–31)
CREAT SERPL-MCNC: 0.86 MG/DL — SIGNIFICANT CHANGE UP (ref 0.5–1.3)
GLUCOSE BLDC GLUCOMTR-MCNC: 116 MG/DL — HIGH (ref 70–99)
GLUCOSE BLDC GLUCOMTR-MCNC: 127 MG/DL — HIGH (ref 70–99)
GLUCOSE BLDC GLUCOMTR-MCNC: 80 MG/DL — SIGNIFICANT CHANGE UP (ref 70–99)
GLUCOSE BLDC GLUCOMTR-MCNC: 87 MG/DL — SIGNIFICANT CHANGE UP (ref 70–99)
GLUCOSE SERPL-MCNC: 91 MG/DL — SIGNIFICANT CHANGE UP (ref 70–99)
HBA1C BLD-MCNC: 5.3 % — SIGNIFICANT CHANGE UP (ref 4–5.6)
HCT VFR BLD CALC: 37.3 % — SIGNIFICANT CHANGE UP (ref 34.5–45)
HGB BLD-MCNC: 12.5 G/DL — SIGNIFICANT CHANGE UP (ref 11.5–15.5)
MAGNESIUM SERPL-MCNC: 1.7 MG/DL — SIGNIFICANT CHANGE UP (ref 1.6–2.6)
MCHC RBC-ENTMCNC: 32.6 PG — SIGNIFICANT CHANGE UP (ref 27–34)
MCHC RBC-ENTMCNC: 33.5 GM/DL — SIGNIFICANT CHANGE UP (ref 32–36)
MCV RBC AUTO: 97.1 FL — SIGNIFICANT CHANGE UP (ref 80–100)
NRBC # BLD: 0 /100 WBCS — SIGNIFICANT CHANGE UP (ref 0–0)
PHOSPHATE SERPL-MCNC: 4.1 MG/DL — SIGNIFICANT CHANGE UP (ref 2.5–4.5)
PLATELET # BLD AUTO: 208 K/UL — SIGNIFICANT CHANGE UP (ref 150–400)
POTASSIUM SERPL-MCNC: 4 MMOL/L — SIGNIFICANT CHANGE UP (ref 3.5–5.3)
POTASSIUM SERPL-SCNC: 4 MMOL/L — SIGNIFICANT CHANGE UP (ref 3.5–5.3)
RBC # BLD: 3.84 M/UL — SIGNIFICANT CHANGE UP (ref 3.8–5.2)
RBC # FLD: 12.7 % — SIGNIFICANT CHANGE UP (ref 10.3–14.5)
RH IG SCN BLD-IMP: POSITIVE — SIGNIFICANT CHANGE UP
SODIUM SERPL-SCNC: 140 MMOL/L — SIGNIFICANT CHANGE UP (ref 135–145)
WBC # BLD: 3.93 K/UL — SIGNIFICANT CHANGE UP (ref 3.8–10.5)
WBC # FLD AUTO: 3.93 K/UL — SIGNIFICANT CHANGE UP (ref 3.8–10.5)

## 2019-02-14 PROCEDURE — 74019 RADEX ABDOMEN 2 VIEWS: CPT | Mod: 26

## 2019-02-14 RX ORDER — INSULIN LISPRO 100/ML
VIAL (ML) SUBCUTANEOUS EVERY 6 HOURS
Qty: 0 | Refills: 0 | Status: DISCONTINUED | OUTPATIENT
Start: 2019-02-14 | End: 2019-02-19

## 2019-02-14 RX ORDER — BUPIVACAINE 13.3 MG/ML
20 INJECTION, SUSPENSION, LIPOSOMAL INFILTRATION ONCE
Qty: 0 | Refills: 0 | Status: DISCONTINUED | OUTPATIENT
Start: 2019-02-14 | End: 2019-02-14

## 2019-02-14 RX ORDER — ACETAMINOPHEN 500 MG
1000 TABLET ORAL ONCE
Qty: 0 | Refills: 0 | Status: COMPLETED | OUTPATIENT
Start: 2019-02-15 | End: 2019-02-15

## 2019-02-14 RX ORDER — ONDANSETRON 8 MG/1
4 TABLET, FILM COATED ORAL EVERY 4 HOURS
Qty: 0 | Refills: 0 | Status: DISCONTINUED | OUTPATIENT
Start: 2019-02-14 | End: 2019-02-19

## 2019-02-14 RX ORDER — DEXTROSE 50 % IN WATER 50 %
15 SYRINGE (ML) INTRAVENOUS ONCE
Qty: 0 | Refills: 0 | Status: DISCONTINUED | OUTPATIENT
Start: 2019-02-14 | End: 2019-02-19

## 2019-02-14 RX ORDER — SODIUM CHLORIDE 9 MG/ML
1000 INJECTION, SOLUTION INTRAVENOUS
Qty: 0 | Refills: 0 | Status: DISCONTINUED | OUTPATIENT
Start: 2019-02-14 | End: 2019-02-19

## 2019-02-14 RX ORDER — DEXTROSE 50 % IN WATER 50 %
12.5 SYRINGE (ML) INTRAVENOUS ONCE
Qty: 0 | Refills: 0 | Status: DISCONTINUED | OUTPATIENT
Start: 2019-02-14 | End: 2019-02-19

## 2019-02-14 RX ORDER — SODIUM CHLORIDE 9 MG/ML
1000 INJECTION, SOLUTION INTRAVENOUS
Qty: 0 | Refills: 0 | Status: DISCONTINUED | OUTPATIENT
Start: 2019-02-14 | End: 2019-02-16

## 2019-02-14 RX ORDER — HEPARIN SODIUM 5000 [USP'U]/ML
5000 INJECTION INTRAVENOUS; SUBCUTANEOUS EVERY 8 HOURS
Qty: 0 | Refills: 0 | Status: DISCONTINUED | OUTPATIENT
Start: 2019-02-15 | End: 2019-02-19

## 2019-02-14 RX ORDER — ACETAMINOPHEN 500 MG
1000 TABLET ORAL ONCE
Qty: 0 | Refills: 0 | Status: COMPLETED | OUTPATIENT
Start: 2019-02-15 | End: 2019-02-14

## 2019-02-14 RX ORDER — DEXTROSE 50 % IN WATER 50 %
25 SYRINGE (ML) INTRAVENOUS ONCE
Qty: 0 | Refills: 0 | Status: DISCONTINUED | OUTPATIENT
Start: 2019-02-14 | End: 2019-02-19

## 2019-02-14 RX ORDER — GLUCAGON INJECTION, SOLUTION 0.5 MG/.1ML
1 INJECTION, SOLUTION SUBCUTANEOUS ONCE
Qty: 0 | Refills: 0 | Status: DISCONTINUED | OUTPATIENT
Start: 2019-02-14 | End: 2019-02-19

## 2019-02-14 RX ORDER — HYDROMORPHONE HYDROCHLORIDE 2 MG/ML
0.5 INJECTION INTRAMUSCULAR; INTRAVENOUS; SUBCUTANEOUS
Qty: 0 | Refills: 0 | Status: DISCONTINUED | OUTPATIENT
Start: 2019-02-14 | End: 2019-02-14

## 2019-02-14 RX ADMIN — Medication 400 MILLIGRAM(S): at 23:43

## 2019-02-14 RX ADMIN — Medication 1000 MILLIGRAM(S): at 00:13

## 2019-02-14 RX ADMIN — HEPARIN SODIUM 5000 UNIT(S): 5000 INJECTION INTRAVENOUS; SUBCUTANEOUS at 06:00

## 2019-02-14 RX ADMIN — SODIUM CHLORIDE 125 MILLILITER(S): 9 INJECTION INTRAMUSCULAR; INTRAVENOUS; SUBCUTANEOUS at 05:24

## 2019-02-14 NOTE — BRIEF OPERATIVE NOTE - PROCEDURE
<<-----Click on this checkbox to enter Procedure Incisional hernia repair with mesh  02/14/2019    Active  CDEJESUS  Lysis of adhesions  02/14/2019    Active  CDEJESUS  Diagnostic laparoscopy  02/14/2019    Active  CDETIFFANIES

## 2019-02-14 NOTE — PROGRESS NOTE ADULT - SUBJECTIVE AND OBJECTIVE BOX
SUBJECTIVE: Patient seen and examined bedside by chief resident. + flatus + BM     heparin  Injectable 5000 Unit(s) SubCutaneous every 8 hours      Vital Signs Last 24 Hrs  T(C): 36.2 (2019 09:12), Max: 36.9 (2019 05:19)  T(F): 97.2 (2019 09:12), Max: 98.4 (2019 05:19)  HR: 54 (2019 09:12) (53 - 65)  BP: 148/73 (2019 09:12) (125/65 - 161/65)  BP(mean): --  RR: 16 (2019 09:12) (16 - 18)  SpO2: 98% (2019 09:12) (96% - 100%)  I&O's Detail    2019 07:01  -  2019 07:00  --------------------------------------------------------  IN:    sodium chloride 0.9%.: 1850 mL    Solution: 100 mL  Total IN: 1950 mL    OUT:    Nasoenteral Tube: 525 mL    Voided: 400 mL  Total OUT: 925 mL    Total NET: 1025 mL      2019 07:01  -  2019 11:58  --------------------------------------------------------  IN:    sodium chloride 0.9%.: 125 mL    Solution: 150 mL  Total IN: 275 mL    OUT:    Voided: 200 mL  Total OUT: 200 mL    Total NET: 75 mL          General: NAD, resting comfortably in bed  C/V: NSR  Pulm: Nonlabored breathing, no respiratory distress  Abd: soft, ND, tenderness to palpation   Extrem: WWP, no edema, SCDs in place        LABS:                        12.5   3.93  )-----------( 208      ( 2019 07:42 )             37.3     02-14    140  |  104  |  18  ----------------------------<  91  4.0   |  23  |  0.86    Ca    9.9      2019 07:42  Phos  4.1     02-  Mg     1.7     -14    TPro  7.5  /  Alb  4.9  /  TBili  0.7  /  DBili  x   /  AST  25  /  ALT  26  /  AlkPhos  106  02-13    PT/INR - ( 2019 09:29 )   PT: 11.8 sec;   INR: 1.04          PTT - ( 2019 09:29 )  PTT:32.7 sec  Urinalysis Basic - ( 2019 11:06 )    Color: Yellow / Appearance: Clear / S.015 / pH: x  Gluc: x / Ketone: NEGATIVE  / Bili: Negative / Urobili: 0.2 E.U./dL   Blood: x / Protein: NEGATIVE mg/dL / Nitrite: POSITIVE   Leuk Esterase: NEGATIVE / RBC: < 5 /HPF / WBC < 5 /HPF   Sq Epi: x / Non Sq Epi: 0-5 /HPF / Bacteria: Present /HPF        RADIOLOGY & ADDITIONAL STUDIES:

## 2019-02-14 NOTE — PROGRESS NOTE ADULT - ASSESSMENT
71F PMHx Hypothyroid, HTN, DM, with infraumbilical hernia a/w SBO.    pain/nausea control without narcotics  home meds as appropriate  IS/OOB  NPO, IVF, NGT  SCDs, HSQ  serial abdominal exams  OR today

## 2019-02-14 NOTE — PROGRESS NOTE ADULT - SUBJECTIVE AND OBJECTIVE BOX
Team 4 Surgery Post-Op Note, PCN:     Pre-Op Dx: ventral hernia  Procedure: Diagnostic laparoscopy  Lysis of adhesions  Incisional hernia repair with mesh    Surgeon: Mauri Ag    Subjective: Postoperatively, she feels well; pain is well-controlled. No headache, chest pain, dyspnea, nausea, vomiting or calf pain. Has not urinated, passed flatus, had a BM or been OOB yet. No acute complaints.      Vital Signs Last 24 Hrs  T(C): 37 (2019 18:08), Max: 37 (2019 13:45)  T(F): 98.6 (2019 18:08), Max: 98.6 (2019 13:45)  HR: 64 (2019 22:15) (53 - 68)  BP: 134/51 (2019 22:15) (133/83 - 160/62)  BP(mean): 65 (2019 22:15) (65 - 199)  RR: 19 (2019 22:15) (12 - 19)  SpO2: 98% (2019 22:15) (96% - 100%)    Physical Exam:  General: NAD, resting comfortably in bed  Pulmonary: Nonlabored breathing, no respiratory distress  Cardiovascular: RRR  Abdominal: soft, NT/ND, abdominal binder in place, incisions CDI with Dermabond in place, no jeffery-incisional erythema or induration  Extremities: WWP, normal strength, no calf tenderness, SCDs in place  Neuro: no focal deficits  Psych: pleasant    LABS:                        12.5   3.93  )-----------( 208      ( 2019 07:42 )             37.3     02-14    140  |  104  |  18  ----------------------------<  91  4.0   |  23  |  0.86    Ca    9.9      2019 07:42  Phos  4.1     02-14  Mg     1.7     -14    TPro  7.5  /  Alb  4.9  /  TBili  0.7  /  DBili  x   /  AST  25  /  ALT  26  /  AlkPhos  106  02-13    PT/INR - ( 2019 09:29 )   PT: 11.8 sec;   INR: 1.04          PTT - ( 2019 09:29 )  PTT:32.7 sec  CAPILLARY BLOOD GLUCOSE      POCT Blood Glucose.: 127 mg/dL (2019 22:48)  POCT Blood Glucose.: 116 mg/dL (2019 22:12)  POCT Blood Glucose.: 80 mg/dL (2019 11:06)  POCT Blood Glucose.: 87 mg/dL (2019 07:25)    Urinalysis Basic - ( 2019 11:06 )    Color: Yellow / Appearance: Clear / S.015 / pH: x  Gluc: x / Ketone: NEGATIVE  / Bili: Negative / Urobili: 0.2 E.U./dL   Blood: x / Protein: NEGATIVE mg/dL / Nitrite: POSITIVE   Leuk Esterase: NEGATIVE / RBC: < 5 /HPF / WBC < 5 /HPF   Sq Epi: x / Non Sq Epi: 0-5 /HPF / Bacteria: Present /HPF      LIVER FUNCTIONS - ( 2019 09:29 )  Alb: 4.9 g/dL / Pro: 7.5 g/dL / ALK PHOS: 106 U/L / ALT: 26 U/L / AST: 25 U/L / GGT: x           ABO Interpretation: A ( @ 10:13)

## 2019-02-14 NOTE — PROGRESS NOTE ADULT - ASSESSMENT
71F PMHx Hypothyroid, HTN, DM, with infraumbilical hernia a/w SBO.    pain/nausea control without narcotics  home meds as appropriate  IS/OOB  NPO, IVF, NGT  SCDs, HSQ

## 2019-02-14 NOTE — BRIEF OPERATIVE NOTE - OPERATION/FINDINGS
Diagnostic laparoscopy with norman technique in LUQ, lysis of adhesions from small bowel to abdominal wall sharply, small bowel ran from TI to LOT with multiple dense interloop adhesions, and adhesions from small bowel to transverse colon. No serosal tears encountered. Incisional hernia repaired primarily with intraperitoneal Bard Large Ventralight ST mesh covering all defects. NEPTALI block performed with exparel at the endo of the case.

## 2019-02-15 DIAGNOSIS — Z71.89 OTHER SPECIFIED COUNSELING: ICD-10-CM

## 2019-02-15 LAB
ANION GAP SERPL CALC-SCNC: 19 MMOL/L — HIGH (ref 5–17)
BASOPHILS # BLD AUTO: 0.01 K/UL — SIGNIFICANT CHANGE UP (ref 0–0.2)
BASOPHILS NFR BLD AUTO: 0.1 % — SIGNIFICANT CHANGE UP (ref 0–2)
BUN SERPL-MCNC: 20 MG/DL — SIGNIFICANT CHANGE UP (ref 7–23)
CALCIUM SERPL-MCNC: 9.2 MG/DL — SIGNIFICANT CHANGE UP (ref 8.4–10.5)
CHLORIDE SERPL-SCNC: 108 MMOL/L — SIGNIFICANT CHANGE UP (ref 96–108)
CO2 SERPL-SCNC: 17 MMOL/L — LOW (ref 22–31)
CREAT SERPL-MCNC: 0.98 MG/DL — SIGNIFICANT CHANGE UP (ref 0.5–1.3)
EOSINOPHIL # BLD AUTO: 0 K/UL — SIGNIFICANT CHANGE UP (ref 0–0.5)
EOSINOPHIL NFR BLD AUTO: 0 % — SIGNIFICANT CHANGE UP (ref 0–6)
GLUCOSE BLDC GLUCOMTR-MCNC: 105 MG/DL — HIGH (ref 70–99)
GLUCOSE BLDC GLUCOMTR-MCNC: 111 MG/DL — HIGH (ref 70–99)
GLUCOSE BLDC GLUCOMTR-MCNC: 122 MG/DL — HIGH (ref 70–99)
GLUCOSE SERPL-MCNC: 120 MG/DL — HIGH (ref 70–99)
HCT VFR BLD CALC: 31.6 % — LOW (ref 34.5–45)
HGB BLD-MCNC: 10.4 G/DL — LOW (ref 11.5–15.5)
IMM GRANULOCYTES NFR BLD AUTO: 0.4 % — SIGNIFICANT CHANGE UP (ref 0–1.5)
LYMPHOCYTES # BLD AUTO: 0.67 K/UL — LOW (ref 1–3.3)
LYMPHOCYTES # BLD AUTO: 9.3 % — LOW (ref 13–44)
MAGNESIUM SERPL-MCNC: 1.5 MG/DL — LOW (ref 1.6–2.6)
MCHC RBC-ENTMCNC: 32.5 PG — SIGNIFICANT CHANGE UP (ref 27–34)
MCHC RBC-ENTMCNC: 32.9 GM/DL — SIGNIFICANT CHANGE UP (ref 32–36)
MCV RBC AUTO: 98.8 FL — SIGNIFICANT CHANGE UP (ref 80–100)
MONOCYTES # BLD AUTO: 0.61 K/UL — SIGNIFICANT CHANGE UP (ref 0–0.9)
MONOCYTES NFR BLD AUTO: 8.5 % — SIGNIFICANT CHANGE UP (ref 2–14)
NEUTROPHILS # BLD AUTO: 5.87 K/UL — SIGNIFICANT CHANGE UP (ref 1.8–7.4)
NEUTROPHILS NFR BLD AUTO: 81.7 % — HIGH (ref 43–77)
NRBC # BLD: 0 /100 WBCS — SIGNIFICANT CHANGE UP (ref 0–0)
PHOSPHATE SERPL-MCNC: 5.2 MG/DL — HIGH (ref 2.5–4.5)
PLATELET # BLD AUTO: 170 K/UL — SIGNIFICANT CHANGE UP (ref 150–400)
POTASSIUM SERPL-MCNC: 4 MMOL/L — SIGNIFICANT CHANGE UP (ref 3.5–5.3)
POTASSIUM SERPL-SCNC: 4 MMOL/L — SIGNIFICANT CHANGE UP (ref 3.5–5.3)
RBC # BLD: 3.2 M/UL — LOW (ref 3.8–5.2)
RBC # FLD: 12.8 % — SIGNIFICANT CHANGE UP (ref 10.3–14.5)
SODIUM SERPL-SCNC: 144 MMOL/L — SIGNIFICANT CHANGE UP (ref 135–145)
WBC # BLD: 7.19 K/UL — SIGNIFICANT CHANGE UP (ref 3.8–10.5)
WBC # FLD AUTO: 7.19 K/UL — SIGNIFICANT CHANGE UP (ref 3.8–10.5)

## 2019-02-15 RX ORDER — HYDROMORPHONE HYDROCHLORIDE 2 MG/ML
0.5 INJECTION INTRAMUSCULAR; INTRAVENOUS; SUBCUTANEOUS ONCE
Qty: 0 | Refills: 0 | Status: DISCONTINUED | OUTPATIENT
Start: 2019-02-15 | End: 2019-02-15

## 2019-02-15 RX ORDER — MAGNESIUM SULFATE 500 MG/ML
2 VIAL (ML) INJECTION ONCE
Qty: 0 | Refills: 0 | Status: COMPLETED | OUTPATIENT
Start: 2019-02-15 | End: 2019-02-15

## 2019-02-15 RX ORDER — ACETAMINOPHEN 500 MG
1000 TABLET ORAL ONCE
Qty: 0 | Refills: 0 | Status: COMPLETED | OUTPATIENT
Start: 2019-02-15 | End: 2019-02-15

## 2019-02-15 RX ADMIN — Medication 400 MILLIGRAM(S): at 06:15

## 2019-02-15 RX ADMIN — HYDROMORPHONE HYDROCHLORIDE 0.5 MILLIGRAM(S): 2 INJECTION INTRAMUSCULAR; INTRAVENOUS; SUBCUTANEOUS at 17:28

## 2019-02-15 RX ADMIN — HEPARIN SODIUM 5000 UNIT(S): 5000 INJECTION INTRAVENOUS; SUBCUTANEOUS at 02:09

## 2019-02-15 RX ADMIN — HYDROMORPHONE HYDROCHLORIDE 0.5 MILLIGRAM(S): 2 INJECTION INTRAMUSCULAR; INTRAVENOUS; SUBCUTANEOUS at 18:00

## 2019-02-15 RX ADMIN — SODIUM CHLORIDE 100 MILLILITER(S): 9 INJECTION, SOLUTION INTRAVENOUS at 06:15

## 2019-02-15 RX ADMIN — Medication 1000 MILLIGRAM(S): at 22:44

## 2019-02-15 RX ADMIN — HEPARIN SODIUM 5000 UNIT(S): 5000 INJECTION INTRAVENOUS; SUBCUTANEOUS at 10:22

## 2019-02-15 RX ADMIN — Medication 400 MILLIGRAM(S): at 22:14

## 2019-02-15 RX ADMIN — Medication 1000 MILLIGRAM(S): at 13:35

## 2019-02-15 RX ADMIN — SODIUM CHLORIDE 100 MILLILITER(S): 9 INJECTION, SOLUTION INTRAVENOUS at 17:29

## 2019-02-15 RX ADMIN — Medication 400 MILLIGRAM(S): at 13:19

## 2019-02-15 RX ADMIN — Medication 50 GRAM(S): at 14:45

## 2019-02-15 RX ADMIN — Medication 1000 MILLIGRAM(S): at 07:00

## 2019-02-15 RX ADMIN — HEPARIN SODIUM 5000 UNIT(S): 5000 INJECTION INTRAVENOUS; SUBCUTANEOUS at 17:26

## 2019-02-15 NOTE — PROGRESS NOTE ADULT - SUBJECTIVE AND OBJECTIVE BOX
STATUS POST:  : incisional hernia repair with intraperitoneal mesh     SUBJECTIVE: Patient seen and examined bedside by chief resident. No acute events overnight. Denies fever, chills, nausea, emesis, chest pain, dyspnea, abdominal pain.     heparin  Injectable 5000 Unit(s) SubCutaneous every 8 hours      Vital Signs Last 24 Hrs  T(C): 36.2 (15 Feb 2019 14:13), Max: 37.3 (15 Feb 2019 05:31)  T(F): 97.2 (15 Feb 2019 14:13), Max: 99.2 (15 Feb 2019 05:31)  HR: 65 (15 Feb 2019 14:13) (57 - 68)  BP: 111/61 (15 Feb 2019 14:13) (111/61 - 160/62)  BP(mean): 65 (14 Feb 2019 22:15) (65 - 199)  RR: 16 (15 Feb 2019 14:13) (12 - 19)  SpO2: 95% (15 Feb 2019 14:13) (94% - 100%)  I&O's Detail    14 Feb 2019 07:01  -  15 Feb 2019 07:00  --------------------------------------------------------  IN:    lactated ringers.: 1000 mL    sodium chloride 0.9%: 875 mL    Solution: 150 mL  Total IN: 2025 mL    OUT:    Indwelling Catheter - Urethral: 590 mL    Nasoenteral Tube: 200 mL    Voided: 275 mL  Total OUT: 1065 mL    Total NET: 960 mL      15 Feb 2019 07:01  -  15 Feb 2019 14:57  --------------------------------------------------------  IN:    lactated ringers.: 700 mL    Solution: 100 mL  Total IN: 800 mL    OUT:    Nasoenteral Tube: 100 mL    Voided: 450 mL  Total OUT: 550 mL    Total NET: 250 mL          General: NAD, resting comfortably in bed  C/V: NSR  Pulm: Nonlabored breathing, no respiratory distress  Abd: soft, tender to palpation, non distended, incisions c/d/i  Extrem: WWP, no edema, SCDs in place        LABS:                        10.4   7.19  )-----------( 170      ( 15 Feb 2019 07:08 )             31.6     02-15    144  |  108  |  20  ----------------------------<  120<H>  4.0   |  17<L>  |  0.98    Ca    9.2      15 Feb 2019 07:08  Phos  5.2     02-15  Mg     1.5     02-15            RADIOLOGY & ADDITIONAL STUDIES:

## 2019-02-15 NOTE — PROGRESS NOTE ADULT - ASSESSMENT
71F PMHx Hypothyroid, HTN, DM, with infraumbilical hernia a/w SBO, s/p diagnostic laparoscopy, lysis of adhesions with incisional hernia repair with intraperitoneal mesh (2/14).    pain/nausea control without narcotics  home meds as appropriate  IS/OOB  NPO sips/chips, IVF  SCDs, HSQ

## 2019-02-16 LAB
ANION GAP SERPL CALC-SCNC: 15 MMOL/L — SIGNIFICANT CHANGE UP (ref 5–17)
BUN SERPL-MCNC: 21 MG/DL — SIGNIFICANT CHANGE UP (ref 7–23)
CALCIUM SERPL-MCNC: 9.7 MG/DL — SIGNIFICANT CHANGE UP (ref 8.4–10.5)
CHLORIDE SERPL-SCNC: 107 MMOL/L — SIGNIFICANT CHANGE UP (ref 96–108)
CO2 SERPL-SCNC: 22 MMOL/L — SIGNIFICANT CHANGE UP (ref 22–31)
CREAT SERPL-MCNC: 0.88 MG/DL — SIGNIFICANT CHANGE UP (ref 0.5–1.3)
GLUCOSE BLDC GLUCOMTR-MCNC: 114 MG/DL — HIGH (ref 70–99)
GLUCOSE BLDC GLUCOMTR-MCNC: 130 MG/DL — HIGH (ref 70–99)
GLUCOSE BLDC GLUCOMTR-MCNC: 137 MG/DL — HIGH (ref 70–99)
GLUCOSE BLDC GLUCOMTR-MCNC: 223 MG/DL — HIGH (ref 70–99)
GLUCOSE SERPL-MCNC: 111 MG/DL — HIGH (ref 70–99)
HCT VFR BLD CALC: 32.3 % — LOW (ref 34.5–45)
HGB BLD-MCNC: 10.8 G/DL — LOW (ref 11.5–15.5)
MAGNESIUM SERPL-MCNC: 2 MG/DL — SIGNIFICANT CHANGE UP (ref 1.6–2.6)
MCHC RBC-ENTMCNC: 32.6 PG — SIGNIFICANT CHANGE UP (ref 27–34)
MCHC RBC-ENTMCNC: 33.4 GM/DL — SIGNIFICANT CHANGE UP (ref 32–36)
MCV RBC AUTO: 97.6 FL — SIGNIFICANT CHANGE UP (ref 80–100)
NRBC # BLD: 0 /100 WBCS — SIGNIFICANT CHANGE UP (ref 0–0)
PHOSPHATE SERPL-MCNC: 2.7 MG/DL — SIGNIFICANT CHANGE UP (ref 2.5–4.5)
PLATELET # BLD AUTO: 196 K/UL — SIGNIFICANT CHANGE UP (ref 150–400)
POTASSIUM SERPL-MCNC: 4.2 MMOL/L — SIGNIFICANT CHANGE UP (ref 3.5–5.3)
POTASSIUM SERPL-SCNC: 4.2 MMOL/L — SIGNIFICANT CHANGE UP (ref 3.5–5.3)
RBC # BLD: 3.31 M/UL — LOW (ref 3.8–5.2)
RBC # FLD: 13 % — SIGNIFICANT CHANGE UP (ref 10.3–14.5)
SODIUM SERPL-SCNC: 144 MMOL/L — SIGNIFICANT CHANGE UP (ref 135–145)
WBC # BLD: 6.98 K/UL — SIGNIFICANT CHANGE UP (ref 3.8–10.5)
WBC # FLD AUTO: 6.98 K/UL — SIGNIFICANT CHANGE UP (ref 3.8–10.5)

## 2019-02-16 RX ORDER — HYDROMORPHONE HYDROCHLORIDE 2 MG/ML
0.5 INJECTION INTRAMUSCULAR; INTRAVENOUS; SUBCUTANEOUS ONCE
Qty: 0 | Refills: 0 | Status: DISCONTINUED | OUTPATIENT
Start: 2019-02-16 | End: 2019-02-16

## 2019-02-16 RX ORDER — ACETAMINOPHEN 500 MG
1000 TABLET ORAL ONCE
Qty: 0 | Refills: 0 | Status: COMPLETED | OUTPATIENT
Start: 2019-02-16 | End: 2019-02-16

## 2019-02-16 RX ORDER — HYDRALAZINE HCL 50 MG
10 TABLET ORAL ONCE
Qty: 0 | Refills: 0 | Status: COMPLETED | OUTPATIENT
Start: 2019-02-16 | End: 2019-02-16

## 2019-02-16 RX ORDER — ACETAMINOPHEN 500 MG
1000 TABLET ORAL ONCE
Qty: 0 | Refills: 0 | Status: COMPLETED | OUTPATIENT
Start: 2019-02-17 | End: 2019-02-16

## 2019-02-16 RX ADMIN — HYDROMORPHONE HYDROCHLORIDE 0.5 MILLIGRAM(S): 2 INJECTION INTRAMUSCULAR; INTRAVENOUS; SUBCUTANEOUS at 02:06

## 2019-02-16 RX ADMIN — HEPARIN SODIUM 5000 UNIT(S): 5000 INJECTION INTRAVENOUS; SUBCUTANEOUS at 01:55

## 2019-02-16 RX ADMIN — ONDANSETRON 4 MILLIGRAM(S): 8 TABLET, FILM COATED ORAL at 23:30

## 2019-02-16 RX ADMIN — ONDANSETRON 4 MILLIGRAM(S): 8 TABLET, FILM COATED ORAL at 18:01

## 2019-02-16 RX ADMIN — Medication 10 MILLIGRAM(S): at 16:27

## 2019-02-16 RX ADMIN — Medication 1000 MILLIGRAM(S): at 17:15

## 2019-02-16 RX ADMIN — HYDROMORPHONE HYDROCHLORIDE 0.5 MILLIGRAM(S): 2 INJECTION INTRAMUSCULAR; INTRAVENOUS; SUBCUTANEOUS at 02:36

## 2019-02-16 RX ADMIN — Medication 1000 MILLIGRAM(S): at 09:45

## 2019-02-16 RX ADMIN — Medication 400 MILLIGRAM(S): at 09:28

## 2019-02-16 RX ADMIN — Medication 400 MILLIGRAM(S): at 23:29

## 2019-02-16 RX ADMIN — Medication 400 MILLIGRAM(S): at 17:01

## 2019-02-16 RX ADMIN — HEPARIN SODIUM 5000 UNIT(S): 5000 INJECTION INTRAVENOUS; SUBCUTANEOUS at 09:29

## 2019-02-16 RX ADMIN — HEPARIN SODIUM 5000 UNIT(S): 5000 INJECTION INTRAVENOUS; SUBCUTANEOUS at 17:01

## 2019-02-16 RX ADMIN — Medication 4: at 17:01

## 2019-02-16 NOTE — PROGRESS NOTE ADULT - SUBJECTIVE AND OBJECTIVE BOX
ID: 71F PMHx Hypothyroid, HTN, DM, with infraumbilical hernia a/w SBO, s/p diagnostic laparoscopy, lysis of adhesions with incisional hernia repair with intraperitoneal mesh (2/14).      SUBJECTIVE: Patient denies bowel function      OBJECTIVE:    Vital Signs:  Vital Signs Last 24 Hrs  T(C): 37.2 (16 Feb 2019 08:30), Max: 37.2 (16 Feb 2019 05:44)  T(F): 99 (16 Feb 2019 08:30), Max: 99 (16 Feb 2019 05:44)  HR: 67 (16 Feb 2019 08:30) (65 - 69)  BP: 148/78 (16 Feb 2019 08:30) (111/61 - 148/78)  BP(mean): --  RR: 16 (16 Feb 2019 08:30) (16 - 17)  SpO2: 93% (16 Feb 2019 08:30) (93% - 95%)    Physical Exam:  General: NAD  Pulmonary: Nonlabored breathing, no respiratory distress  Cardiovascular: No heaves/thrills  Abdominal: Soft, NT/ND, incisions CDI  Extremities: WWP, no clubbing/cyanosis/edema  Neuro: AAO x3, no focal deficits    Lines/Drains/Tubes:    Ins and Outs:  I&O's Summary    15 Feb 2019 07:01  -  16 Feb 2019 07:00  --------------------------------------------------------  IN: 2400 mL / OUT: 1050 mL / NET: 1350 mL    16 Feb 2019 07:01  -  16 Feb 2019 13:21  --------------------------------------------------------  IN: 0 mL / OUT: 100 mL / NET: -100 mL        Labs:                        10.8   6.98  )-----------( 196      ( 16 Feb 2019 06:15 )             32.3     02-16    144  |  107  |  21  ----------------------------<  111<H>  4.2   |  22  |  0.88    Ca    9.7      16 Feb 2019 06:15  Phos  2.7     02-16  Mg     2.0     02-16          CAPILLARY BLOOD GLUCOSE      POCT Blood Glucose.: 137 mg/dL (16 Feb 2019 11:22)  POCT Blood Glucose.: 114 mg/dL (16 Feb 2019 07:31)  POCT Blood Glucose.: 105 mg/dL (15 Feb 2019 21:11)  POCT Blood Glucose.: 122 mg/dL (15 Feb 2019 16:46)        Radiology & Additional Studies:

## 2019-02-16 NOTE — PROGRESS NOTE ADULT - ASSESSMENT
A/P: 71F PMHx Hypothyroid, HTN, DM, with infraumbilical hernia a/w SBO, s/p diagnostic laparoscopy, lysis of adhesions with incisional hernia repair with intraperitoneal mesh (2/14). Will monitor tolerance of CLD.    pain/nausea control without narcotics  home meds as appropriate  IS/OOB  CLD, IVF  SCDs, HSQ

## 2019-02-17 LAB
ANION GAP SERPL CALC-SCNC: 14 MMOL/L — SIGNIFICANT CHANGE UP (ref 5–17)
BUN SERPL-MCNC: 13 MG/DL — SIGNIFICANT CHANGE UP (ref 7–23)
CALCIUM SERPL-MCNC: 10 MG/DL — SIGNIFICANT CHANGE UP (ref 8.4–10.5)
CHLORIDE SERPL-SCNC: 104 MMOL/L — SIGNIFICANT CHANGE UP (ref 96–108)
CO2 SERPL-SCNC: 23 MMOL/L — SIGNIFICANT CHANGE UP (ref 22–31)
CREAT SERPL-MCNC: 0.7 MG/DL — SIGNIFICANT CHANGE UP (ref 0.5–1.3)
GLUCOSE BLDC GLUCOMTR-MCNC: 120 MG/DL — HIGH (ref 70–99)
GLUCOSE BLDC GLUCOMTR-MCNC: 127 MG/DL — HIGH (ref 70–99)
GLUCOSE BLDC GLUCOMTR-MCNC: 135 MG/DL — HIGH (ref 70–99)
GLUCOSE BLDC GLUCOMTR-MCNC: 139 MG/DL — HIGH (ref 70–99)
GLUCOSE BLDC GLUCOMTR-MCNC: 143 MG/DL — HIGH (ref 70–99)
GLUCOSE SERPL-MCNC: 167 MG/DL — HIGH (ref 70–99)
HCT VFR BLD CALC: 35.4 % — SIGNIFICANT CHANGE UP (ref 34.5–45)
HGB BLD-MCNC: 11.7 G/DL — SIGNIFICANT CHANGE UP (ref 11.5–15.5)
MAGNESIUM SERPL-MCNC: 1.6 MG/DL — SIGNIFICANT CHANGE UP (ref 1.6–2.6)
MCHC RBC-ENTMCNC: 32.2 PG — SIGNIFICANT CHANGE UP (ref 27–34)
MCHC RBC-ENTMCNC: 33.1 GM/DL — SIGNIFICANT CHANGE UP (ref 32–36)
MCV RBC AUTO: 97.5 FL — SIGNIFICANT CHANGE UP (ref 80–100)
NRBC # BLD: 0 /100 WBCS — SIGNIFICANT CHANGE UP (ref 0–0)
PHOSPHATE SERPL-MCNC: 2.3 MG/DL — LOW (ref 2.5–4.5)
PLATELET # BLD AUTO: 212 K/UL — SIGNIFICANT CHANGE UP (ref 150–400)
POTASSIUM SERPL-MCNC: 3.9 MMOL/L — SIGNIFICANT CHANGE UP (ref 3.5–5.3)
POTASSIUM SERPL-SCNC: 3.9 MMOL/L — SIGNIFICANT CHANGE UP (ref 3.5–5.3)
RBC # BLD: 3.63 M/UL — LOW (ref 3.8–5.2)
RBC # FLD: 12.8 % — SIGNIFICANT CHANGE UP (ref 10.3–14.5)
SODIUM SERPL-SCNC: 141 MMOL/L — SIGNIFICANT CHANGE UP (ref 135–145)
WBC # BLD: 8.51 K/UL — SIGNIFICANT CHANGE UP (ref 3.8–10.5)
WBC # FLD AUTO: 8.51 K/UL — SIGNIFICANT CHANGE UP (ref 3.8–10.5)

## 2019-02-17 PROCEDURE — 74019 RADEX ABDOMEN 2 VIEWS: CPT | Mod: 26

## 2019-02-17 RX ORDER — OXYCODONE AND ACETAMINOPHEN 5; 325 MG/1; MG/1
1 TABLET ORAL EVERY 4 HOURS
Qty: 0 | Refills: 0 | Status: DISCONTINUED | OUTPATIENT
Start: 2019-02-17 | End: 2019-02-18

## 2019-02-17 RX ORDER — KETOROLAC TROMETHAMINE 30 MG/ML
30 SYRINGE (ML) INJECTION EVERY 8 HOURS
Qty: 0 | Refills: 0 | Status: DISCONTINUED | OUTPATIENT
Start: 2019-02-17 | End: 2019-02-18

## 2019-02-17 RX ORDER — ONDANSETRON 8 MG/1
4 TABLET, FILM COATED ORAL ONCE
Qty: 0 | Refills: 0 | Status: COMPLETED | OUTPATIENT
Start: 2019-02-17 | End: 2019-02-17

## 2019-02-17 RX ORDER — ACETAMINOPHEN 500 MG
1000 TABLET ORAL ONCE
Qty: 0 | Refills: 0 | Status: COMPLETED | OUTPATIENT
Start: 2019-02-17 | End: 2019-02-17

## 2019-02-17 RX ORDER — MORPHINE SULFATE 50 MG/1
2 CAPSULE, EXTENDED RELEASE ORAL EVERY 4 HOURS
Qty: 0 | Refills: 0 | Status: DISCONTINUED | OUTPATIENT
Start: 2019-02-17 | End: 2019-02-18

## 2019-02-17 RX ORDER — POTASSIUM PHOSPHATE, MONOBASIC POTASSIUM PHOSPHATE, DIBASIC 236; 224 MG/ML; MG/ML
15 INJECTION, SOLUTION INTRAVENOUS ONCE
Qty: 0 | Refills: 0 | Status: COMPLETED | OUTPATIENT
Start: 2019-02-17 | End: 2019-02-17

## 2019-02-17 RX ORDER — MAGNESIUM SULFATE 500 MG/ML
2 VIAL (ML) INJECTION EVERY 6 HOURS
Qty: 0 | Refills: 0 | Status: COMPLETED | OUTPATIENT
Start: 2019-02-17 | End: 2019-02-17

## 2019-02-17 RX ADMIN — POTASSIUM PHOSPHATE, MONOBASIC POTASSIUM PHOSPHATE, DIBASIC 62.5 MILLIMOLE(S): 236; 224 INJECTION, SOLUTION INTRAVENOUS at 12:16

## 2019-02-17 RX ADMIN — ONDANSETRON 4 MILLIGRAM(S): 8 TABLET, FILM COATED ORAL at 02:37

## 2019-02-17 RX ADMIN — Medication 30 MILLIGRAM(S): at 20:42

## 2019-02-17 RX ADMIN — HEPARIN SODIUM 5000 UNIT(S): 5000 INJECTION INTRAVENOUS; SUBCUTANEOUS at 01:04

## 2019-02-17 RX ADMIN — HEPARIN SODIUM 5000 UNIT(S): 5000 INJECTION INTRAVENOUS; SUBCUTANEOUS at 17:42

## 2019-02-17 RX ADMIN — Medication 400 MILLIGRAM(S): at 05:02

## 2019-02-17 RX ADMIN — Medication 30 MILLIGRAM(S): at 21:14

## 2019-02-17 RX ADMIN — OXYCODONE AND ACETAMINOPHEN 1 TABLET(S): 5; 325 TABLET ORAL at 16:30

## 2019-02-17 RX ADMIN — ONDANSETRON 4 MILLIGRAM(S): 8 TABLET, FILM COATED ORAL at 05:02

## 2019-02-17 RX ADMIN — ONDANSETRON 4 MILLIGRAM(S): 8 TABLET, FILM COATED ORAL at 10:42

## 2019-02-17 RX ADMIN — Medication 50 GRAM(S): at 11:16

## 2019-02-17 RX ADMIN — Medication 50 GRAM(S): at 17:42

## 2019-02-17 RX ADMIN — Medication 1000 MILLIGRAM(S): at 06:30

## 2019-02-17 RX ADMIN — OXYCODONE AND ACETAMINOPHEN 1 TABLET(S): 5; 325 TABLET ORAL at 14:58

## 2019-02-17 RX ADMIN — Medication 1000 MILLIGRAM(S): at 00:04

## 2019-02-17 RX ADMIN — HEPARIN SODIUM 5000 UNIT(S): 5000 INJECTION INTRAVENOUS; SUBCUTANEOUS at 10:40

## 2019-02-17 NOTE — PROGRESS NOTE ADULT - SUBJECTIVE AND OBJECTIVE BOX
INTERVAL HPI/OVERNIGHT EVENTS: +f;atus +BM    STATUS POST:  Incisional hernia repair    POST OPERATIVE DAY #: 3    SUBJECTIVE:  Flatus: [x ] YES [ ] NO             Bowel Movement: [x ] YES [ ] NO  Pain (0-10):            Pain Control Adequate: [x ] YES [ ] NO  Nausea: [ ] YES [x ] NO            Vomiting: [ ] YES [x ] NO  Diarrhea: [ ] YES [ x] NO         Constipation: [ ] YES [x ] NO     Chest Pain: [ ] YES [ x] NO    SOB:  [ ] YES [x ] NO    MEDICATIONS  (STANDING):  dextrose 5%. 1000 milliLiter(s) (50 mL/Hr) IV Continuous <Continuous>  dextrose 50% Injectable 12.5 Gram(s) IV Push once  dextrose 50% Injectable 25 Gram(s) IV Push once  dextrose 50% Injectable 25 Gram(s) IV Push once  heparin  Injectable 5000 Unit(s) SubCutaneous every 8 hours  insulin lispro (HumaLOG) corrective regimen sliding scale   SubCutaneous every 6 hours  magnesium sulfate  IVPB 2 Gram(s) IV Intermittent every 6 hours    MEDICATIONS  (PRN):  dextrose 40% Gel 15 Gram(s) Oral once PRN Blood Glucose LESS THAN 70 milliGRAM(s)/deciliter  glucagon  Injectable 1 milliGRAM(s) IntraMuscular once PRN Glucose LESS THAN 70 milligrams/deciliter  ketorolac   Injectable 30 milliGRAM(s) IV Push every 8 hours PRN Moderate Pain (4 - 6)  morphine  - Injectable 2 milliGRAM(s) IV Push every 4 hours PRN Severe Pain (7 - 10) if percocet 1 tablet ineffective.  ondansetron Injectable 4 milliGRAM(s) IV Push every 4 hours PRN Nausea and/or Vomiting  oxyCODONE    5 mG/acetaminophen 325 mG 1 Tablet(s) Oral every 4 hours PRN Severe Pain (7 - 10)      Vital Signs Last 24 Hrs  T(C): 37.3 (17 Feb 2019 16:40), Max: 37.4 (17 Feb 2019 07:55)  T(F): 99.2 (17 Feb 2019 16:40), Max: 99.3 (17 Feb 2019 07:55)  HR: 71 (17 Feb 2019 16:40) (70 - 84)  BP: 151/74 (17 Feb 2019 16:40) (146/78 - 160/79)  BP(mean): --  RR: 17 (17 Feb 2019 16:40) (17 - 18)  SpO2: 96% (17 Feb 2019 16:40) (94% - 97%)    PHYSICAL EXAM:      Constitutional: A&Ox3       Respiratory: non labored breathing, no respiratory distress    Cardiovascular: NSR, RRR    Gastrointestinal: Mildly distended, soft, +RLQ tenderness                 Incision: CDI    Genitourinary: voids    Extremities: (-) edema                  I&O's Detail    16 Feb 2019 07:01  -  17 Feb 2019 07:00  --------------------------------------------------------  IN:    lactated ringers.: 900 mL    Oral Fluid: 560 mL    Solution: 200 mL  Total IN: 1660 mL    OUT:    Voided: 950 mL  Total OUT: 950 mL    Total NET: 710 mL      17 Feb 2019 07:01  -  17 Feb 2019 17:38  --------------------------------------------------------  IN:    Solution: 50 mL    Solution: 250 mL  Total IN: 300 mL    OUT:    Voided: 200 mL  Total OUT: 200 mL    Total NET: 100 mL          LABS:                        11.7   8.51  )-----------( 212      ( 17 Feb 2019 07:05 )             35.4     02-17    141  |  104  |  13  ----------------------------<  167<H>  3.9   |  23  |  0.70    Ca    10.0      17 Feb 2019 07:05  Phos  2.3     02-17  Mg     1.6     02-17            RADIOLOGY & ADDITIONAL STUDIES:

## 2019-02-17 NOTE — PROGRESS NOTE ADULT - ASSESSMENT
71F PMHx Hypothyroid, HTN, DM, with infraumbilical hernia a/w SBO, s/p diagnostic laparoscopy, lysis of adhesions with incisional hernia repair with intraperitoneal mesh (2/14).      continue CLD/IVF  pain/nausea control  home meds as appropriate  IS/OOB  DVT PPx: SCDs, HSQ  am labs

## 2019-02-18 LAB
ANION GAP SERPL CALC-SCNC: 13 MMOL/L — SIGNIFICANT CHANGE UP (ref 5–17)
BUN SERPL-MCNC: 14 MG/DL — SIGNIFICANT CHANGE UP (ref 7–23)
CALCIUM SERPL-MCNC: 9.6 MG/DL — SIGNIFICANT CHANGE UP (ref 8.4–10.5)
CHLORIDE SERPL-SCNC: 106 MMOL/L — SIGNIFICANT CHANGE UP (ref 96–108)
CO2 SERPL-SCNC: 23 MMOL/L — SIGNIFICANT CHANGE UP (ref 22–31)
CREAT SERPL-MCNC: 0.85 MG/DL — SIGNIFICANT CHANGE UP (ref 0.5–1.3)
GLUCOSE BLDC GLUCOMTR-MCNC: 105 MG/DL — HIGH (ref 70–99)
GLUCOSE BLDC GLUCOMTR-MCNC: 106 MG/DL — HIGH (ref 70–99)
GLUCOSE BLDC GLUCOMTR-MCNC: 122 MG/DL — HIGH (ref 70–99)
GLUCOSE BLDC GLUCOMTR-MCNC: 124 MG/DL — HIGH (ref 70–99)
GLUCOSE BLDC GLUCOMTR-MCNC: 144 MG/DL — HIGH (ref 70–99)
GLUCOSE SERPL-MCNC: 118 MG/DL — HIGH (ref 70–99)
HCT VFR BLD CALC: 36.4 % — SIGNIFICANT CHANGE UP (ref 34.5–45)
HGB BLD-MCNC: 12.1 G/DL — SIGNIFICANT CHANGE UP (ref 11.5–15.5)
MAGNESIUM SERPL-MCNC: 2.2 MG/DL — SIGNIFICANT CHANGE UP (ref 1.6–2.6)
MCHC RBC-ENTMCNC: 32.9 PG — SIGNIFICANT CHANGE UP (ref 27–34)
MCHC RBC-ENTMCNC: 33.2 GM/DL — SIGNIFICANT CHANGE UP (ref 32–36)
MCV RBC AUTO: 98.9 FL — SIGNIFICANT CHANGE UP (ref 80–100)
NRBC # BLD: 0 /100 WBCS — SIGNIFICANT CHANGE UP (ref 0–0)
PHOSPHATE SERPL-MCNC: 2.8 MG/DL — SIGNIFICANT CHANGE UP (ref 2.5–4.5)
PLATELET # BLD AUTO: 271 K/UL — SIGNIFICANT CHANGE UP (ref 150–400)
POTASSIUM SERPL-MCNC: 4.2 MMOL/L — SIGNIFICANT CHANGE UP (ref 3.5–5.3)
POTASSIUM SERPL-SCNC: 4.2 MMOL/L — SIGNIFICANT CHANGE UP (ref 3.5–5.3)
RBC # BLD: 3.68 M/UL — LOW (ref 3.8–5.2)
RBC # FLD: 13.1 % — SIGNIFICANT CHANGE UP (ref 10.3–14.5)
SODIUM SERPL-SCNC: 142 MMOL/L — SIGNIFICANT CHANGE UP (ref 135–145)
WBC # BLD: 7.46 K/UL — SIGNIFICANT CHANGE UP (ref 3.8–10.5)
WBC # FLD AUTO: 7.46 K/UL — SIGNIFICANT CHANGE UP (ref 3.8–10.5)

## 2019-02-18 RX ORDER — HYDROMORPHONE HYDROCHLORIDE 2 MG/ML
0.25 INJECTION INTRAMUSCULAR; INTRAVENOUS; SUBCUTANEOUS EVERY 4 HOURS
Qty: 0 | Refills: 0 | Status: DISCONTINUED | OUTPATIENT
Start: 2019-02-18 | End: 2019-02-18

## 2019-02-18 RX ORDER — HYDROMORPHONE HYDROCHLORIDE 2 MG/ML
0.5 INJECTION INTRAMUSCULAR; INTRAVENOUS; SUBCUTANEOUS EVERY 4 HOURS
Qty: 0 | Refills: 0 | Status: DISCONTINUED | OUTPATIENT
Start: 2019-02-18 | End: 2019-02-18

## 2019-02-18 RX ORDER — MORPHINE SULFATE 50 MG/1
2 CAPSULE, EXTENDED RELEASE ORAL EVERY 4 HOURS
Qty: 0 | Refills: 0 | Status: DISCONTINUED | OUTPATIENT
Start: 2019-02-18 | End: 2019-02-18

## 2019-02-18 RX ORDER — KETOROLAC TROMETHAMINE 30 MG/ML
15 SYRINGE (ML) INJECTION EVERY 8 HOURS
Qty: 0 | Refills: 0 | Status: DISCONTINUED | OUTPATIENT
Start: 2019-02-18 | End: 2019-02-19

## 2019-02-18 RX ORDER — ACETAMINOPHEN 500 MG
325 TABLET ORAL EVERY 4 HOURS
Qty: 0 | Refills: 0 | Status: DISCONTINUED | OUTPATIENT
Start: 2019-02-18 | End: 2019-02-19

## 2019-02-18 RX ORDER — OXYCODONE AND ACETAMINOPHEN 5; 325 MG/1; MG/1
2 TABLET ORAL EVERY 4 HOURS
Qty: 0 | Refills: 0 | Status: DISCONTINUED | OUTPATIENT
Start: 2019-02-18 | End: 2019-02-19

## 2019-02-18 RX ORDER — MORPHINE SULFATE 50 MG/1
4 CAPSULE, EXTENDED RELEASE ORAL EVERY 4 HOURS
Qty: 0 | Refills: 0 | Status: DISCONTINUED | OUTPATIENT
Start: 2019-02-18 | End: 2019-02-18

## 2019-02-18 RX ORDER — LOSARTAN POTASSIUM 100 MG/1
100 TABLET, FILM COATED ORAL DAILY
Qty: 0 | Refills: 0 | Status: DISCONTINUED | OUTPATIENT
Start: 2019-02-19 | End: 2019-02-19

## 2019-02-18 RX ORDER — OXYCODONE AND ACETAMINOPHEN 5; 325 MG/1; MG/1
1 TABLET ORAL EVERY 4 HOURS
Qty: 0 | Refills: 0 | Status: DISCONTINUED | OUTPATIENT
Start: 2019-02-18 | End: 2019-02-19

## 2019-02-18 RX ORDER — POTASSIUM PHOSPHATE, MONOBASIC POTASSIUM PHOSPHATE, DIBASIC 236; 224 MG/ML; MG/ML
15 INJECTION, SOLUTION INTRAVENOUS ONCE
Qty: 0 | Refills: 0 | Status: COMPLETED | OUTPATIENT
Start: 2019-02-18 | End: 2019-02-18

## 2019-02-18 RX ADMIN — OXYCODONE AND ACETAMINOPHEN 1 TABLET(S): 5; 325 TABLET ORAL at 09:32

## 2019-02-18 RX ADMIN — Medication 15 MILLIGRAM(S): at 22:56

## 2019-02-18 RX ADMIN — Medication 15 MILLIGRAM(S): at 14:22

## 2019-02-18 RX ADMIN — Medication 15 MILLIGRAM(S): at 14:08

## 2019-02-18 RX ADMIN — HEPARIN SODIUM 5000 UNIT(S): 5000 INJECTION INTRAVENOUS; SUBCUTANEOUS at 18:47

## 2019-02-18 RX ADMIN — Medication 30 MILLIGRAM(S): at 07:46

## 2019-02-18 RX ADMIN — OXYCODONE AND ACETAMINOPHEN 1 TABLET(S): 5; 325 TABLET ORAL at 09:46

## 2019-02-18 RX ADMIN — HEPARIN SODIUM 5000 UNIT(S): 5000 INJECTION INTRAVENOUS; SUBCUTANEOUS at 10:09

## 2019-02-18 RX ADMIN — ONDANSETRON 4 MILLIGRAM(S): 8 TABLET, FILM COATED ORAL at 07:31

## 2019-02-18 RX ADMIN — HEPARIN SODIUM 5000 UNIT(S): 5000 INJECTION INTRAVENOUS; SUBCUTANEOUS at 00:42

## 2019-02-18 RX ADMIN — Medication 15 MILLIGRAM(S): at 22:26

## 2019-02-18 RX ADMIN — POTASSIUM PHOSPHATE, MONOBASIC POTASSIUM PHOSPHATE, DIBASIC 62.5 MILLIMOLE(S): 236; 224 INJECTION, SOLUTION INTRAVENOUS at 10:09

## 2019-02-18 RX ADMIN — Medication 30 MILLIGRAM(S): at 07:27

## 2019-02-18 NOTE — DIETITIAN INITIAL EVALUATION ADULT. - PERTINENT LABORATORY DATA
----- Message from Yoshi Cunningham sent at 4/30/2018 12:17 PM CDT -----  Contact: Patient [phone]  Patient would like to get test results.  Name of test (lab, mammo, etc.): MRI   Date of test:  4/20/18  Ordering provider: Dr. Mendez  Where was the test performed:  [facility]       glu [id number]

## 2019-02-18 NOTE — DIETITIAN INITIAL EVALUATION ADULT. - ENERGY NEEDS
Height: 5'4" Weight: 172lbs, IBW 120lbs+/-10%, %%, BMI 29.5  IBW used for calculations as pt >120% of IBW   Nutrient needs based on [facility] standards of care for maintenance in older adults.   Needs adjusted for age and post-op

## 2019-02-18 NOTE — PROGRESS NOTE ADULT - SUBJECTIVE AND OBJECTIVE BOX
SUBJECTIVE: Tolerating diet, -flatus/BM. Patient seen and examined bedside by chief resident.    heparin  Injectable 5000 Unit(s) SubCutaneous every 8 hours      Vital Signs Last 24 Hrs  T(C): 37.2 (18 Feb 2019 07:48), Max: 37.5 (17 Feb 2019 20:30)  T(F): 99 (18 Feb 2019 07:48), Max: 99.5 (17 Feb 2019 20:30)  HR: 67 (18 Feb 2019 07:48) (65 - 73)  BP: 147/73 (18 Feb 2019 07:48) (146/78 - 161/74)  BP(mean): --  RR: 17 (18 Feb 2019 07:48) (16 - 18)  SpO2: 97% (18 Feb 2019 07:48) (95% - 97%)  I&O's Detail    17 Feb 2019 07:01  -  18 Feb 2019 07:00  --------------------------------------------------------  IN:    Oral Fluid: 320 mL    Solution: 250 mL    Solution: 100 mL  Total IN: 670 mL    OUT:    Voided: 200 mL  Total OUT: 200 mL    Total NET: 470 mL          General: NAD, resting comfortably in bed  C/V: NSR  Pulm: Nonlabored breathing, no respiratory distress  Abd: softly distended, mildly tender in mid abd        LABS:                        12.1   7.46  )-----------( 271      ( 18 Feb 2019 07:12 )             36.4     02-18    142  |  106  |  14  ----------------------------<  118<H>  4.2   |  23  |  0.85    Ca    9.6      18 Feb 2019 07:12  Phos  2.8     02-18  Mg     2.2     02-18

## 2019-02-18 NOTE — DIETITIAN INITIAL EVALUATION ADULT. - OTHER INFO
72yo F PMHx Hypothyroid, HTN, DM, w/ infraumbilical hernia a/w SBO, s/p diagnostic laparoscopy, lysis of adhesions w/ incisional hernia repair with intraperitoneal mesh (2/14). Pt seen in room, resting in bed. Currently on a clear liquid diet and tolerating PO. Had ~4oz tea this am. C/o nausea, no vomiting.  Documented in flowsheet that last BM was 2/17. Discussed small frequent sips to stimulate motility. Also discussed likely diet advancement process to regular, soft pending medical clearance by team. No weight changes PTA. NKFA or dietary restrictions. Skin: surgical incision; GI WDL per flowsheet. RD to follow.

## 2019-02-18 NOTE — PROGRESS NOTE ADULT - ASSESSMENT
71F PMHx Hypothyroid, HTN, DM, with infraumbilical hernia a/w SBO, s/p diagnostic laparoscopy, lysis of adhesions with incisional hernia repair with intraperitoneal mesh (2/14).    pain/nausea control  home meds as appropriate  IS/OOB  CLD  SCDs, HSQ

## 2019-02-18 NOTE — DIETITIAN INITIAL EVALUATION ADULT. - NS AS NUTRI INTERV MEALS SNACK
General/healthful diet/Recommend advancing to soft, DASH/TLC and CSTCHO once deemed medically feasible

## 2019-02-19 ENCOUNTER — TRANSCRIPTION ENCOUNTER (OUTPATIENT)
Age: 74
End: 2019-02-19

## 2019-02-19 VITALS
HEART RATE: 63 BPM | TEMPERATURE: 99 F | SYSTOLIC BLOOD PRESSURE: 147 MMHG | RESPIRATION RATE: 17 BRPM | DIASTOLIC BLOOD PRESSURE: 78 MMHG | OXYGEN SATURATION: 97 %

## 2019-02-19 LAB
ANION GAP SERPL CALC-SCNC: 8 MMOL/L — SIGNIFICANT CHANGE UP (ref 5–17)
BUN SERPL-MCNC: 15 MG/DL — SIGNIFICANT CHANGE UP (ref 7–23)
CALCIUM SERPL-MCNC: 9 MG/DL — SIGNIFICANT CHANGE UP (ref 8.4–10.5)
CHLORIDE SERPL-SCNC: 108 MMOL/L — SIGNIFICANT CHANGE UP (ref 96–108)
CO2 SERPL-SCNC: 26 MMOL/L — SIGNIFICANT CHANGE UP (ref 22–31)
CREAT SERPL-MCNC: 0.88 MG/DL — SIGNIFICANT CHANGE UP (ref 0.5–1.3)
GLUCOSE BLDC GLUCOMTR-MCNC: 109 MG/DL — HIGH (ref 70–99)
GLUCOSE BLDC GLUCOMTR-MCNC: 119 MG/DL — HIGH (ref 70–99)
GLUCOSE BLDC GLUCOMTR-MCNC: 137 MG/DL — HIGH (ref 70–99)
GLUCOSE SERPL-MCNC: 132 MG/DL — HIGH (ref 70–99)
HCT VFR BLD CALC: 31.2 % — LOW (ref 34.5–45)
HGB BLD-MCNC: 10.3 G/DL — LOW (ref 11.5–15.5)
MAGNESIUM SERPL-MCNC: 1.9 MG/DL — SIGNIFICANT CHANGE UP (ref 1.6–2.6)
MCHC RBC-ENTMCNC: 32.8 PG — SIGNIFICANT CHANGE UP (ref 27–34)
MCHC RBC-ENTMCNC: 33 GM/DL — SIGNIFICANT CHANGE UP (ref 32–36)
MCV RBC AUTO: 99.4 FL — SIGNIFICANT CHANGE UP (ref 80–100)
NRBC # BLD: 0 /100 WBCS — SIGNIFICANT CHANGE UP (ref 0–0)
PHOSPHATE SERPL-MCNC: 3.4 MG/DL — SIGNIFICANT CHANGE UP (ref 2.5–4.5)
PLATELET # BLD AUTO: 197 K/UL — SIGNIFICANT CHANGE UP (ref 150–400)
POTASSIUM SERPL-MCNC: 4.9 MMOL/L — SIGNIFICANT CHANGE UP (ref 3.5–5.3)
POTASSIUM SERPL-SCNC: 4.9 MMOL/L — SIGNIFICANT CHANGE UP (ref 3.5–5.3)
RBC # BLD: 3.14 M/UL — LOW (ref 3.8–5.2)
RBC # FLD: 12.8 % — SIGNIFICANT CHANGE UP (ref 10.3–14.5)
SODIUM SERPL-SCNC: 142 MMOL/L — SIGNIFICANT CHANGE UP (ref 135–145)
WBC # BLD: 4.13 K/UL — SIGNIFICANT CHANGE UP (ref 3.8–10.5)
WBC # FLD AUTO: 4.13 K/UL — SIGNIFICANT CHANGE UP (ref 3.8–10.5)

## 2019-02-19 PROCEDURE — 81001 URINALYSIS AUTO W/SCOPE: CPT

## 2019-02-19 PROCEDURE — C1781: CPT

## 2019-02-19 PROCEDURE — 85610 PROTHROMBIN TIME: CPT

## 2019-02-19 PROCEDURE — 96361 HYDRATE IV INFUSION ADD-ON: CPT

## 2019-02-19 PROCEDURE — 71046 X-RAY EXAM CHEST 2 VIEWS: CPT

## 2019-02-19 PROCEDURE — 96375 TX/PRO/DX INJ NEW DRUG ADDON: CPT

## 2019-02-19 PROCEDURE — 80053 COMPREHEN METABOLIC PANEL: CPT

## 2019-02-19 PROCEDURE — 82962 GLUCOSE BLOOD TEST: CPT

## 2019-02-19 PROCEDURE — 83735 ASSAY OF MAGNESIUM: CPT

## 2019-02-19 PROCEDURE — 74019 RADEX ABDOMEN 2 VIEWS: CPT

## 2019-02-19 PROCEDURE — 84100 ASSAY OF PHOSPHORUS: CPT

## 2019-02-19 PROCEDURE — 93005 ELECTROCARDIOGRAM TRACING: CPT

## 2019-02-19 PROCEDURE — 83690 ASSAY OF LIPASE: CPT

## 2019-02-19 PROCEDURE — 99285 EMERGENCY DEPT VISIT HI MDM: CPT | Mod: 25

## 2019-02-19 PROCEDURE — 71045 X-RAY EXAM CHEST 1 VIEW: CPT

## 2019-02-19 PROCEDURE — 86900 BLOOD TYPING SEROLOGIC ABO: CPT

## 2019-02-19 PROCEDURE — 85027 COMPLETE CBC AUTOMATED: CPT

## 2019-02-19 PROCEDURE — 86901 BLOOD TYPING SEROLOGIC RH(D): CPT

## 2019-02-19 PROCEDURE — 85730 THROMBOPLASTIN TIME PARTIAL: CPT

## 2019-02-19 PROCEDURE — 83036 HEMOGLOBIN GLYCOSYLATED A1C: CPT

## 2019-02-19 PROCEDURE — 36415 COLL VENOUS BLD VENIPUNCTURE: CPT

## 2019-02-19 PROCEDURE — 85025 COMPLETE CBC W/AUTO DIFF WBC: CPT

## 2019-02-19 PROCEDURE — 86850 RBC ANTIBODY SCREEN: CPT

## 2019-02-19 PROCEDURE — 96374 THER/PROPH/DIAG INJ IV PUSH: CPT | Mod: XU

## 2019-02-19 PROCEDURE — 80048 BASIC METABOLIC PNL TOTAL CA: CPT

## 2019-02-19 PROCEDURE — 74177 CT ABD & PELVIS W/CONTRAST: CPT

## 2019-02-19 RX ORDER — MAGNESIUM SULFATE 500 MG/ML
1 VIAL (ML) INJECTION ONCE
Qty: 0 | Refills: 0 | Status: DISCONTINUED | OUTPATIENT
Start: 2019-02-19 | End: 2019-02-19

## 2019-02-19 RX ORDER — DOCUSATE SODIUM 100 MG
1 CAPSULE ORAL
Qty: 6 | Refills: 0
Start: 2019-02-19 | End: 2019-02-21

## 2019-02-19 RX ADMIN — HEPARIN SODIUM 5000 UNIT(S): 5000 INJECTION INTRAVENOUS; SUBCUTANEOUS at 11:29

## 2019-02-19 RX ADMIN — Medication 15 MILLIGRAM(S): at 05:48

## 2019-02-19 RX ADMIN — Medication 15 MILLIGRAM(S): at 05:05

## 2019-02-19 RX ADMIN — HEPARIN SODIUM 5000 UNIT(S): 5000 INJECTION INTRAVENOUS; SUBCUTANEOUS at 01:25

## 2019-02-19 RX ADMIN — LOSARTAN POTASSIUM 100 MILLIGRAM(S): 100 TABLET, FILM COATED ORAL at 05:05

## 2019-02-19 NOTE — DISCHARGE NOTE ADULT - MEDICATION SUMMARY - MEDICATIONS TO TAKE
I will START or STAY ON the medications listed below when I get home from the hospital:    Percocet 5/325 oral tablet  -- 1 tab(s) by mouth every 6 hours, As Needed -for severe pain MDD:4   -- Caution federal law prohibits the transfer of this drug to any person other  than the person for whom it was prescribed.  May cause drowsiness.  Alcohol may intensify this effect.  Use care when operating dangerous machinery.  This prescription cannot be refilled.  This product contains acetaminophen.  Do not use  with any other product containing acetaminophen to prevent possible liver damage.  Using more of this medication than prescribed may cause serious breathing problems.    -- Indication: For Severe pain    losartan 100 mg oral tablet  -- 1 tab(s) by mouth once a day  -- Indication: For Home meds    Jentadueto 2.5 mg-500 mg oral tablet  -- 1 tab(s) by mouth 2 times a day  -- Indication: For Home meds    Colace 100 mg oral capsule  -- 1 cap(s) by mouth 2 times a day, As Needed -for constipation   -- Medication should be taken with plenty of water.    -- Indication: For Constipation    levothyroxine 75 mcg (0.075 mg) oral tablet  -- 1 tab(s) by mouth once a day  -- Indication: For Home meds    Vitamin D2 50,000 intl units (1.25 mg) oral capsule  -- 1 cap(s) by mouth once on Mondays  -- Indication: For Home meds

## 2019-02-19 NOTE — PROGRESS NOTE ADULT - SUBJECTIVE AND OBJECTIVE BOX
SUBJECTIVE: C/o pain, +flatus/BM. Patient seen and examined bedside by chief resident.    heparin  Injectable 5000 Unit(s) SubCutaneous every 8 hours  losartan 100 milliGRAM(s) Oral daily      Vital Signs Last 24 Hrs  T(C): 36.9 (19 Feb 2019 05:10), Max: 36.9 (18 Feb 2019 20:00)  T(F): 98.5 (19 Feb 2019 05:10), Max: 98.5 (18 Feb 2019 20:00)  HR: 66 (19 Feb 2019 05:10) (62 - 70)  BP: 152/80 (19 Feb 2019 05:10) (126/73 - 155/68)  BP(mean): --  RR: 18 (19 Feb 2019 05:10) (16 - 18)  SpO2: 97% (19 Feb 2019 05:10) (94% - 98%)  I&O's Detail    18 Feb 2019 07:01  -  19 Feb 2019 07:00  --------------------------------------------------------  IN:    Oral Fluid: 410 mL    Solution: 250 mL  Total IN: 660 mL    OUT:    Voided: 550 mL  Total OUT: 550 mL    Total NET: 110 mL          General: NAD, resting comfortably in bed  C/V: NSR  Pulm: Nonlabored breathing, no respiratory distress  Abd: softly distended, nontender, incisions CDI      LABS:                        10.3   4.13  )-----------( 197      ( 19 Feb 2019 06:58 )             31.2     02-19    142  |  108  |  15  ----------------------------<  132<H>  4.9   |  26  |  0.88    Ca    9.0      19 Feb 2019 06:58  Phos  3.4     02-19  Mg     1.9     02-19

## 2019-02-19 NOTE — PROGRESS NOTE ADULT - ASSESSMENT
71F PMHx Hypothyroid, HTN, DM, with infraumbilical hernia a/w SBO, s/p diagnostic laparoscopy, lysis of adhesions with incisional hernia repair with intraperitoneal mesh (2/14).    pain/nausea control  home meds as appropriate  IS/OOB  Regular diet  SCDs, HSQ

## 2019-02-19 NOTE — DISCHARGE NOTE ADULT - CARE PLAN
Principal Discharge DX:	SBO (small bowel obstruction)  Goal:	Full recovery  Assessment and plan of treatment:	-Continue eating your regular diet as tolerated and drinking plenty of fluids.   -For pain, you may take over-the-counter Tylenol and/or NSAIDs (such as motrin/advil) as labeled, as needed. For breakthrough pain you may take Percocet, which contains Tylenol. Do NOT exceed 4000mg acetaminophen/Tylenol total within 24 hours. Please take Colace 1 tab twice daily while taking narcotic pain medication to avoid constipation.  -No heavy lifting >20 pounds or strenuous exercise.  -You may remove your bandages 48 hours after surgery. Please keep wounds clean and dry.   -You may shower but NO baths and NO swimming. Keep your incisions clean & dry. Avoid a direct stream of water over incision sites. Do not scrub. Pat dry when done.  -Contact your doctor or go to the ER for fever > 101.5, chills, nausea, vomiting, chest pain, shortness of breath, pain not controlled by medication or excessive bleeding.  -Please follow up with Dr. Ag in 1-2 weeks; you may call the office to make an appointment at your earliest convenience.

## 2019-02-19 NOTE — DISCHARGE NOTE ADULT - HOSPITAL COURSE
71F PMHx Hypothyroid, HTN, DM, with infraumbilical hernia a/w SBO, s/p diagnostic laparoscopy, lysis of adhesions with incisional hernia repair with intraperitoneal mesh (2/14). Patient's post-operative course was uncomplicated. Diet was advanced as tolerated and pain was well controlled on medication. On day of discharge, pt deemed stable and ready to return home with plan to follow up as an outpatient.

## 2019-02-19 NOTE — DISCHARGE NOTE ADULT - PLAN OF CARE
Full recovery -Continue eating your regular diet as tolerated and drinking plenty of fluids.   -For pain, you may take over-the-counter Tylenol and/or NSAIDs (such as motrin/advil) as labeled, as needed. For breakthrough pain you may take Percocet, which contains Tylenol. Do NOT exceed 4000mg acetaminophen/Tylenol total within 24 hours. Please take Colace 1 tab twice daily while taking narcotic pain medication to avoid constipation.  -No heavy lifting >20 pounds or strenuous exercise.  -You may remove your bandages 48 hours after surgery. Please keep wounds clean and dry.   -You may shower but NO baths and NO swimming. Keep your incisions clean & dry. Avoid a direct stream of water over incision sites. Do not scrub. Pat dry when done.  -Contact your doctor or go to the ER for fever > 101.5, chills, nausea, vomiting, chest pain, shortness of breath, pain not controlled by medication or excessive bleeding.  -Please follow up with Dr. Ag in 1-2 weeks; you may call the office to make an appointment at your earliest convenience.

## 2019-02-19 NOTE — DISCHARGE NOTE ADULT - PATIENT PORTAL LINK FT
You can access the [facility] Patient Portal, offered by [facility], by registering with the following website: [email or web address]

## 2019-02-19 NOTE — DISCHARGE NOTE ADULT - CARE PROVIDER_API CALL
Silas Ag)  Surgery  [address] [place] [address], [address]  [place], [place] [address]  Phone: [phone]  Fax: [phone]  Follow Up Time:

## 2019-03-06 ENCOUNTER — APPOINTMENT (OUTPATIENT)
Dept: GYNECOLOGIC ONCOLOGY | Facility: CLINIC | Age: 74
End: 2019-03-06
Payer: MEDICARE

## 2019-03-06 ENCOUNTER — EMERGENCY (EMERGENCY)
Facility: HOSPITAL | Age: 74
LOS: 1 days | Discharge: HOME | End: 2019-03-06
Attending: EMERGENCY MEDICINE | Admitting: EMERGENCY MEDICINE
Payer: MEDICARE

## 2019-03-06 VITALS
TEMPERATURE: 97 F | OXYGEN SATURATION: 100 % | WEIGHT: 166.67 LBS | SYSTOLIC BLOOD PRESSURE: 140 MMHG | DIASTOLIC BLOOD PRESSURE: 72 MMHG | HEART RATE: 60 BPM | RESPIRATION RATE: 18 BRPM

## 2019-03-06 VITALS
BODY MASS INDEX: 30.96 KG/M2 | SYSTOLIC BLOOD PRESSURE: 121 MMHG | WEIGHT: 168.25 LBS | HEART RATE: 64 BPM | DIASTOLIC BLOOD PRESSURE: 77 MMHG | OXYGEN SATURATION: 98 % | HEIGHT: 62 IN

## 2019-03-06 VITALS
TEMPERATURE: 98 F | OXYGEN SATURATION: 100 % | SYSTOLIC BLOOD PRESSURE: 122 MMHG | DIASTOLIC BLOOD PRESSURE: 75 MMHG | RESPIRATION RATE: 16 BRPM | HEART RATE: 67 BPM

## 2019-03-06 DIAGNOSIS — K43.0 INCISIONAL HERNIA WITH OBSTRUCTION, W/OUT GANGRENE: ICD-10-CM

## 2019-03-06 LAB
ALBUMIN SERPL ELPH-MCNC: 4.2 G/DL — SIGNIFICANT CHANGE UP (ref 3.3–5)
ALP SERPL-CCNC: 109 U/L — SIGNIFICANT CHANGE UP (ref 40–120)
ALT FLD-CCNC: 15 U/L — SIGNIFICANT CHANGE UP (ref 10–45)
ANION GAP SERPL CALC-SCNC: 10 MMOL/L — SIGNIFICANT CHANGE UP (ref 5–17)
APPEARANCE UR: CLEAR — SIGNIFICANT CHANGE UP
AST SERPL-CCNC: 18 U/L — SIGNIFICANT CHANGE UP (ref 10–40)
BACTERIA # UR AUTO: PRESENT /HPF
BASOPHILS # BLD AUTO: 0.04 K/UL — SIGNIFICANT CHANGE UP (ref 0–0.2)
BASOPHILS NFR BLD AUTO: 0.7 % — SIGNIFICANT CHANGE UP (ref 0–2)
BILIRUB SERPL-MCNC: 0.5 MG/DL — SIGNIFICANT CHANGE UP (ref 0.2–1.2)
BILIRUB UR-MCNC: NEGATIVE — SIGNIFICANT CHANGE UP
BUN SERPL-MCNC: 18 MG/DL — SIGNIFICANT CHANGE UP (ref 7–23)
CALCIUM SERPL-MCNC: 10 MG/DL — SIGNIFICANT CHANGE UP (ref 8.4–10.5)
CHLORIDE SERPL-SCNC: 101 MMOL/L — SIGNIFICANT CHANGE UP (ref 96–108)
CO2 SERPL-SCNC: 26 MMOL/L — SIGNIFICANT CHANGE UP (ref 22–31)
COLOR SPEC: YELLOW — SIGNIFICANT CHANGE UP
CREAT SERPL-MCNC: 0.91 MG/DL — SIGNIFICANT CHANGE UP (ref 0.5–1.3)
DIFF PNL FLD: NEGATIVE — SIGNIFICANT CHANGE UP
EOSINOPHIL # BLD AUTO: 0.35 K/UL — SIGNIFICANT CHANGE UP (ref 0–0.5)
EOSINOPHIL NFR BLD AUTO: 6.3 % — HIGH (ref 0–6)
EPI CELLS # UR: SIGNIFICANT CHANGE UP /HPF (ref 0–5)
EXTRA SST TUBE: SIGNIFICANT CHANGE UP
GLUCOSE SERPL-MCNC: 113 MG/DL — HIGH (ref 70–99)
GLUCOSE UR QL: NEGATIVE — SIGNIFICANT CHANGE UP
HCT VFR BLD CALC: 31.3 % — LOW (ref 34.5–45)
HGB BLD-MCNC: 10.3 G/DL — LOW (ref 11.5–15.5)
IMM GRANULOCYTES NFR BLD AUTO: 0.2 % — SIGNIFICANT CHANGE UP (ref 0–1.5)
KETONES UR-MCNC: NEGATIVE — SIGNIFICANT CHANGE UP
LEUKOCYTE ESTERASE UR-ACNC: ABNORMAL
LIDOCAIN IGE QN: 33 U/L — SIGNIFICANT CHANGE UP (ref 7–60)
LYMPHOCYTES # BLD AUTO: 0.92 K/UL — LOW (ref 1–3.3)
LYMPHOCYTES # BLD AUTO: 16.6 % — SIGNIFICANT CHANGE UP (ref 13–44)
MCHC RBC-ENTMCNC: 31.6 PG — SIGNIFICANT CHANGE UP (ref 27–34)
MCHC RBC-ENTMCNC: 32.9 GM/DL — SIGNIFICANT CHANGE UP (ref 32–36)
MCV RBC AUTO: 96 FL — SIGNIFICANT CHANGE UP (ref 80–100)
MONOCYTES # BLD AUTO: 0.57 K/UL — SIGNIFICANT CHANGE UP (ref 0–0.9)
MONOCYTES NFR BLD AUTO: 10.3 % — SIGNIFICANT CHANGE UP (ref 2–14)
NEUTROPHILS # BLD AUTO: 3.64 K/UL — SIGNIFICANT CHANGE UP (ref 1.8–7.4)
NEUTROPHILS NFR BLD AUTO: 65.9 % — SIGNIFICANT CHANGE UP (ref 43–77)
NITRITE UR-MCNC: NEGATIVE — SIGNIFICANT CHANGE UP
NRBC # BLD: 0 /100 WBCS — SIGNIFICANT CHANGE UP (ref 0–0)
PH UR: 6 — SIGNIFICANT CHANGE UP (ref 5–8)
PLATELET # BLD AUTO: 296 K/UL — SIGNIFICANT CHANGE UP (ref 150–400)
POTASSIUM SERPL-MCNC: 4 MMOL/L — SIGNIFICANT CHANGE UP (ref 3.5–5.3)
POTASSIUM SERPL-SCNC: 4 MMOL/L — SIGNIFICANT CHANGE UP (ref 3.5–5.3)
PROT SERPL-MCNC: 7 G/DL — SIGNIFICANT CHANGE UP (ref 6–8.3)
PROT UR-MCNC: NEGATIVE MG/DL — SIGNIFICANT CHANGE UP
RBC # BLD: 3.26 M/UL — LOW (ref 3.8–5.2)
RBC # FLD: 12.2 % — SIGNIFICANT CHANGE UP (ref 10.3–14.5)
RBC CASTS # UR COMP ASSIST: < 5 /HPF — SIGNIFICANT CHANGE UP
SODIUM SERPL-SCNC: 137 MMOL/L — SIGNIFICANT CHANGE UP (ref 135–145)
SP GR SPEC: <=1.005 — SIGNIFICANT CHANGE UP (ref 1–1.03)
UROBILINOGEN FLD QL: 0.2 E.U./DL — SIGNIFICANT CHANGE UP
WBC # BLD: 5.53 K/UL — SIGNIFICANT CHANGE UP (ref 3.8–10.5)
WBC # FLD AUTO: 5.53 K/UL — SIGNIFICANT CHANGE UP (ref 3.8–10.5)
WBC UR QL: < 5 /HPF — SIGNIFICANT CHANGE UP

## 2019-03-06 PROCEDURE — 87086 URINE CULTURE/COLONY COUNT: CPT

## 2019-03-06 PROCEDURE — 83690 ASSAY OF LIPASE: CPT

## 2019-03-06 PROCEDURE — 81001 URINALYSIS AUTO W/SCOPE: CPT

## 2019-03-06 PROCEDURE — 74177 CT ABD & PELVIS W/CONTRAST: CPT

## 2019-03-06 PROCEDURE — 99284 EMERGENCY DEPT VISIT MOD MDM: CPT

## 2019-03-06 PROCEDURE — 99284 EMERGENCY DEPT VISIT MOD MDM: CPT | Mod: 25

## 2019-03-06 PROCEDURE — 74177 CT ABD & PELVIS W/CONTRAST: CPT | Mod: 26

## 2019-03-06 PROCEDURE — 85025 COMPLETE CBC W/AUTO DIFF WBC: CPT

## 2019-03-06 PROCEDURE — 87184 SC STD DISK METHOD PER PLATE: CPT

## 2019-03-06 PROCEDURE — 96374 THER/PROPH/DIAG INJ IV PUSH: CPT | Mod: XU

## 2019-03-06 PROCEDURE — 80053 COMPREHEN METABOLIC PANEL: CPT

## 2019-03-06 PROCEDURE — 87186 SC STD MICRODIL/AGAR DIL: CPT

## 2019-03-06 PROCEDURE — 99214 OFFICE O/P EST MOD 30 MIN: CPT

## 2019-03-06 RX ORDER — SODIUM CHLORIDE 9 MG/ML
1000 INJECTION INTRAMUSCULAR; INTRAVENOUS; SUBCUTANEOUS ONCE
Qty: 0 | Refills: 0 | Status: COMPLETED | OUTPATIENT
Start: 2019-03-06 | End: 2019-03-06

## 2019-03-06 RX ORDER — IOHEXOL 300 MG/ML
30 INJECTION, SOLUTION INTRAVENOUS ONCE
Qty: 0 | Refills: 0 | Status: COMPLETED | OUTPATIENT
Start: 2019-03-06 | End: 2019-03-06

## 2019-03-06 RX ORDER — ONDANSETRON 8 MG/1
4 TABLET, FILM COATED ORAL ONCE
Qty: 0 | Refills: 0 | Status: COMPLETED | OUTPATIENT
Start: 2019-03-06 | End: 2019-03-06

## 2019-03-06 RX ADMIN — SODIUM CHLORIDE 1000 MILLILITER(S): 9 INJECTION INTRAMUSCULAR; INTRAVENOUS; SUBCUTANEOUS at 13:20

## 2019-03-06 RX ADMIN — ONDANSETRON 4 MILLIGRAM(S): 8 TABLET, FILM COATED ORAL at 13:20

## 2019-03-06 RX ADMIN — IOHEXOL 30 MILLILITER(S): 300 INJECTION, SOLUTION INTRAVENOUS at 13:20

## 2019-03-06 NOTE — ED PROVIDER NOTE - GASTROINTESTINAL, MLM
Abdomen soft+ lower mid abd ttp RLQ and LLQ + small palpable mass near incision site- which is c/d/i  no guarding.

## 2019-03-06 NOTE — ED ADULT NURSE NOTE - NSIMPLEMENTINTERV_GEN_ALL_ED
Implemented All Fall with Harm Risk Interventions:  [place] to call system. Call bell, personal items and telephone within reach. Instruct patient to call for assistance. Room bathroom lighting operational. Non-slip footwear when patient is off stretcher. Physically safe environment: no spills, clutter or unnecessary equipment. Stretcher in lowest position, wheels locked, appropriate side rails in place. Provide visual cue, wrist band, yellow gown, etc. Monitor gait and stability. Monitor for mental status changes and reorient to person, place, and time. Review medications for side effects contributing to fall risk. Reinforce activity limits and safety measures with patient and family. Provide visual clues: red socks.

## 2019-03-06 NOTE — CONSULT NOTE ADULT - ASSESSMENT
75 yo F with recent ventral hernia repair 2/14, now presenting with abdominal pain, and CT concerning for abdominal wall collections. Per discussion with attending, likely preperitoneal fluid collections are seromas.    Recommend No surgical intervention at this time  Patient can be discharged  Follow up in office with Dr. Heller/ Ancelmo  Pain control  Bowel regimen with senna / Colace    Seen and examined, Discussed with attending

## 2019-03-06 NOTE — ED PROVIDER NOTE - OBJECTIVE STATEMENT
71F PMHx Hypothyroid, HTN, DM, with infraumbilical hernia a/w SBO, s/p diagnostic laparoscopy, lysis of adhesions with incisional hernia repair with intraperitoneal mesh (2/14). 71F PMHx Hypothyroid, HTN, DM, with infraumbilical hernia a/w SBO, s/p diagnostic laparoscopy, lysis of adhesions with incisional hernia repair with intraperitoneal mesh (2/14). co lower abd pain, constipation- last bm was 3 days ago- tolerating po, decreased appetite  no f/c +passing gas  mild-moderate severity  pain lower abd, cramping- not taking meds for pain- no radiation of pain

## 2019-03-06 NOTE — CONSULT NOTE ADULT - SUBJECTIVE AND OBJECTIVE BOX
GENERAL SURGERY CONSULT NOTE    HPI: 73 yo F w/ PMHx T2DM, Hypothyroidism, HTN. Recently, underwent ventral hernia repair with Pre-peritoneal mesh placement, 2/14. Patient now presenting with abdominal pain, and difficulty passing stool. Patient reports, that after surgery she discontinued taking pain medications, and felt improved. She now reports approximately 1 week of abdominal pain, with tenderness along midline incision, and 2 spots protruding. She denies any redness at surgical site, or drainage. No fevers, has been passing gas, and bowel movements. Continues to have appetite.       PAST MEDICAL & SURGICAL HISTORY:  Type 2 diabetes mellitus: type 2  Hypothyroidism: Adult hypothyroidism  Arthropathy: Arthritis  Essential hypertension: HTN (hypertension)  Abdominal swelling: Pelvic mass  Obesity  Elective surgery for purposes other than treating health conditions: Gall Bladder removal  Other postprocedural status: S/P appendectomy  Elective surgery for purposes other than treating health conditions: Uterine Biospy  S/P Appendectomy    REVIEW OF SYSTEMS:   Pertinent positives/negatives noted in HPI.     HOME MEDICATIONS:    ALLERGIES:  Allergies    No Known Allergies    Intolerances        FAMILY HISTORY:  No pertinent family history in first degree relatives      Vital Signs Last 24 Hrs  T(C): 36.9 (06 Mar 2019 15:47), Max: 36.9 (06 Mar 2019 15:47)  T(F): 98.5 (06 Mar 2019 15:47), Max: 98.5 (06 Mar 2019 15:47)  HR: 61 (06 Mar 2019 15:47) (60 - 61)  BP: 117/66 (06 Mar 2019 15:47) (117/66 - 140/72)  BP(mean): --  RR: 16 (06 Mar 2019 15:47) (16 - 18)  SpO2: 100% (06 Mar 2019 15:47) (100% - 100%)    PHYSICAL EXAM:  General: WN/WD NAD  Neurology: A&Ox3, nonfocal, GIBSON x 4  Abdominal: Soft, Mildly tender at sites of abdominal wall collections. Without erythema or induration  Extremities: No edema, + peripheral pulses  Skin: Midline protrusion at site of incision  Incisions: Midline incision, well healing. CDI    LABS:                        10.3   5.53  )-----------( 296      ( 06 Mar 2019 13:25 )             31.3     03-06    137  |  101  |  18  ----------------------------<  113<H>  4.0   |  26  |  0.91    Ca    10.0      06 Mar 2019 13:25    TPro  7.0  /  Alb  4.2  /  TBili  0.5  /  DBili  x   /  AST  18  /  ALT  15  /  AlkPhos  109  03-06      Urinalysis Basic - ( 06 Mar 2019 14:55 )    Color: Yellow / Appearance: Clear / SG: <=1.005 / pH: x  Gluc: x / Ketone: NEGATIVE  / Bili: Negative / Urobili: 0.2 E.U./dL   Blood: x / Protein: NEGATIVE mg/dL / Nitrite: NEGATIVE   Leuk Esterase: Trace / RBC: < 5 /HPF / WBC < 5 /HPF   Sq Epi: x / Non Sq Epi: 0-5 /HPF / Bacteria: Present /HPF    < from: CT Abdomen and Pelvis w/ Oral Cont and w/ IV Cont (03.06.19 @ 14:54) >    PERITONEUM:No ascites, no free air.  BOWEL: Within normal limits.  VESSELS: Within normal limits  RETROPERITONEUM: 0.9 cm right obturator node, enlarged from prior,   nonspecific, follow-up with prior imaging. No retroperitoneal adenopathy.  ABDOMINAL WALL: Status post mesh repair of the ventral hernia. 2   rim-enhancing fluid collections anterior to the mesh decompressing into   the subcutaneous fat. 3.6 x 6 cm collection above the umbilicus. 13 x 8   cm collection belowthe umbilicus.  MUSCULOSKELETAL: Within normal limits    IMPRESSION:     Two large collections associated with the ventral wall mesh highly   suspicious for abscesses/mesh infection.    < end of copied text >

## 2019-03-06 NOTE — ED ADULT NURSE NOTE - OBJECTIVE STATEMENT
75 y/o female c/o diffuse lower abdominal pain and nausea s/p hernia repair surgery 2/14. NAD, VSS. pt reports last BM was Saturday/Sunday. denies fever/chills, vomiting.

## 2019-03-06 NOTE — ED PROVIDER NOTE - CLINICAL SUMMARY MEDICAL DECISION MAKING FREE TEXT BOX
palpable mass on exam- CT shows small collections- surgery consulted- as per Dr Heller- masses are seromas and px can go home, fu with him in office- px hungry- will give stool softeners as well

## 2019-03-06 NOTE — ED ADULT TRIAGE NOTE - CHIEF COMPLAINT QUOTE
abdominal redness and tenderness at suture site.  s/p sbo repair 2/19/19.  Sent in by Dr. Ag.  Denies fever, chills

## 2019-03-06 NOTE — ED PROVIDER NOTE - NSFOLLOWUPINSTRUCTIONS_ED_ALL_ED_FT
Seroma  A seroma is a collection of fluid on the body that looks like swelling or a mass. Seromas form where tissue has been injured or cut. Seromas vary in size. Some are small and painless. Others may become large and cause pain or discomfort. Many seromas go away on their own as the fluid is naturally absorbed by the body, and some seromas need to be drained.    What are the causes?  Seromas form as the result of damage to tissue or the removal of tissue. This tissue damage may occur during surgery or because of an injury or trauma. When tissue is disrupted or removed, empty space is created. The body’s natural defense system (immune system) causes fluid to enter the empty space and form a seroma.    What are the signs or symptoms?  Symptoms of this condition include:    Swelling at the site of a surgical cut (incision) or an injury.  Drainage of clear fluid at the surgery or injury site.  Discomfort or pain.    How is this diagnosed?  This condition is diagnosed based on your symptoms, your medical history, and a physical exam. During the exam, your health care provider will press on the seroma. You may also have tests, including:    Blood tests.  Imaging tests, such as an ultrasound or CT scan.    How is this treated?  Some seromas go away (resolve) on their own. Your health care provider may monitor you to make sure the seroma does not cause any complications. If your seroma does not resolve on its own, treatment may include:    Using a needle to drain the fluid from the seroma (needle aspiration).  Inserting a flexible tube (catheter) to drain the fluid.  Applying a bandage (dressing), such as an elastic bandage or binder.  Antibiotic medicines, if the seroma becomes infected.    In rare cases, surgery may be done to remove the seroma and repair the area.    Follow these instructions at home:  If you were prescribed an antibiotic medicine, take it as told by your health care provider. Do not stop taking the antibiotic even if you start to feel better.  Return to your normal activities as told by your health care provider. Ask your health care provider what activities are safe for you.  Take over-the-counter and prescription medicines only as told by your health care provider.  Check your seroma every day for signs of infection. Check for:    Redness or pain.  Fluid or pus.  More swelling.  Warmth.    ImageKeep all follow-up visits as told by your health care provider. This is important.  Contact a health care provider if:  You have a fever.  You have redness or pain at the site of the seroma.  You have fluid or pus coming from the seroma.  Your seroma is more swollen or is getting bigger.  Your seroma is warm to the touch.  This information is not intended to replace advice given to you by your health care provider. Make sure you discuss any questions you have with your health care provider.    Document Released: 04/14/2014 Document Revised: 09/29/2017 Document Reviewed: 09/29/2017  [employer] Interactive Patient Education © 2019 [employer]

## 2019-03-06 NOTE — ED PROVIDER NOTE - CARE PROVIDER_API CALL
Ike Heller)  ColonRectal Surgery; Surgery  [address], [address]  [place] [address]  Phone: [phone]  Fax: [phone]  Follow Up Time:

## 2019-03-07 NOTE — LETTER BODY
[Dear  ___] : Dear  [unfilled], [Attached please find my note.] : Attached please find my note. [FreeTextEntry2] : cancer surveillance

## 2019-03-07 NOTE — PHYSICAL EXAM
[Absent] : Adnexa(ae): Absent [Fully active, able to carry on all pre-disease performance without restriction] : Status 0 - Fully active, able to carry on all pre-disease performance without restriction [Normal] : Masses/Tenderness: Non-tender, no masses [Abnormal] : Examination for hernias: Abnormal [Abdomen Hernia Ventral] : A(n) [] : incarcerated [de-identified] : SSE and BME: intact vaginal cuff. no pelvic masses

## 2019-03-07 NOTE — DISCUSSION/SUMMARY
[FreeTextEntry1] : Exam today concerning for incarcerated hernia\par Patient, son and  instructed to go directly to [facility] ER\par Dr. Ag aware of findings\par Discussed with patient and family that there was no evidence of recurrent ovarian cancer during surgery

## 2019-03-07 NOTE — HISTORY OF PRESENT ILLNESS
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 9/2015\par \par Previous Therapy\par 1) Endometrial Biopsy 8/2014\par        a) Benign endometrial polyp\par 2) Exploratory laparotomy, total abdominal hysterectomy, bilateral salpingo-oophorectomy, omentectomy, pelvic and para aortic lymphadenectomy 9/9/2015\par      a) Borderline endometrioid adenofibroma with foci of microinvasion of left adnexal mass, 12.4cm, unremarkable fallopian tube, LN 0/9\par     b) Slides reviewed by [facility] - with microscopic foci of endometrioid adenocarcinoma in background of borderline L ovarian tumor\par 3) Vaginal Biopsy 4/2016\par     a)Granulation tissue\par 4) PET/CT 2/3/17  FAMILIA\par 5) Colonoscopy normal 1/2017\par 6)CEA= 4.9 on 8/14/2017, has been persistently elevated even while FAMILIA most recent 3.7\par 7) Hospitalized at [facility] 2/2019 with SBO and incarcerated hernia\par    a) Diagnostic laparoscopy with Dr. Ag and Dr. Vines\par   b) Patient FAMILIA, with incarcerated ventral hernia with mesh placement 2/19/19\par \par \par Here with abdominal pain. Has not seen Dr. Ag since her discharge from the hospital. She is having abdominal pain (6/10, somewhat improved from before surgery), persistent nausea. Has had 1 BM and is passing a bit of gas over the past two weeks. No vomiting. MInimal PO intake per patient. \par \par Health Maintenance\par Mammogram 2/2016\par Colonoscopy 2017- repeat 5 years

## 2019-03-09 LAB
-  AMPICILLIN/SULBACTAM: SIGNIFICANT CHANGE UP
-  AMPICILLIN: SIGNIFICANT CHANGE UP
-  CEFAZOLIN: SIGNIFICANT CHANGE UP
-  CEFTRIAXONE: SIGNIFICANT CHANGE UP
-  CEFTRIAXONE: SIGNIFICANT CHANGE UP
-  CIPROFLOXACIN: SIGNIFICANT CHANGE UP
-  GENTAMICIN: SIGNIFICANT CHANGE UP
-  NITROFURANTOIN: SIGNIFICANT CHANGE UP
-  PIPERACILLIN/TAZOBACTAM: SIGNIFICANT CHANGE UP
-  PIPERACILLIN/TAZOBACTAM: SIGNIFICANT CHANGE UP
-  TOBRAMYCIN: SIGNIFICANT CHANGE UP
-  TRIMETHOPRIM/SULFAMETHOXAZOLE: SIGNIFICANT CHANGE UP
CULTURE RESULTS: SIGNIFICANT CHANGE UP
METHOD TYPE: SIGNIFICANT CHANGE UP
ORGANISM # SPEC MICROSCOPIC CNT: SIGNIFICANT CHANGE UP
SPECIMEN SOURCE: SIGNIFICANT CHANGE UP

## 2019-03-10 DIAGNOSIS — I10 ESSENTIAL (PRIMARY) HYPERTENSION: ICD-10-CM

## 2019-03-10 DIAGNOSIS — E03.9 HYPOTHYROIDISM, UNSPECIFIED: ICD-10-CM

## 2019-03-10 DIAGNOSIS — Z79.891 LONG TERM (CURRENT) USE OF OPIATE ANALGESIC: ICD-10-CM

## 2019-03-10 DIAGNOSIS — R10.32 LEFT LOWER QUADRANT PAIN: ICD-10-CM

## 2019-03-10 DIAGNOSIS — E11.9 TYPE 2 DIABETES MELLITUS WITHOUT COMPLICATIONS: ICD-10-CM

## 2019-03-10 DIAGNOSIS — R10.31 RIGHT LOWER QUADRANT PAIN: ICD-10-CM

## 2019-03-10 DIAGNOSIS — Z79.899 OTHER LONG TERM (CURRENT) DRUG THERAPY: ICD-10-CM

## 2019-05-13 ENCOUNTER — APPOINTMENT (OUTPATIENT)
Dept: GYNECOLOGIC ONCOLOGY | Facility: CLINIC | Age: 74
End: 2019-05-13
Payer: MEDICARE

## 2019-05-13 VITALS
WEIGHT: 169 LBS | OXYGEN SATURATION: 92 % | BODY MASS INDEX: 31.1 KG/M2 | SYSTOLIC BLOOD PRESSURE: 137 MMHG | HEART RATE: 60 BPM | DIASTOLIC BLOOD PRESSURE: 82 MMHG | HEIGHT: 62 IN

## 2019-05-13 DIAGNOSIS — R35.0 FREQUENCY OF MICTURITION: ICD-10-CM

## 2019-05-13 PROCEDURE — 99212 OFFICE O/P EST SF 10 MIN: CPT

## 2019-05-14 LAB
APPEARANCE: CLEAR
BACTERIA: ABNORMAL
BILIRUBIN URINE: NEGATIVE
BLOOD URINE: NEGATIVE
COLOR: YELLOW
GLUCOSE QUALITATIVE U: NEGATIVE
HYALINE CASTS: 1 /LPF
KETONES URINE: NEGATIVE
LEUKOCYTE ESTERASE URINE: ABNORMAL
MICROSCOPIC-UA: NORMAL
NITRITE URINE: POSITIVE
PH URINE: 6
PROTEIN URINE: NORMAL
RED BLOOD CELLS URINE: 1 /HPF
SPECIFIC GRAVITY URINE: 1.02
SQUAMOUS EPITHELIAL CELLS: 0 /HPF
UROBILINOGEN URINE: NORMAL
WHITE BLOOD CELLS URINE: 22 /HPF

## 2019-05-16 LAB — BACTERIA UR CULT: ABNORMAL

## 2019-05-16 RX ORDER — SULFAMETHOXAZOLE AND TRIMETHOPRIM 800; 160 MG/1; MG/1
800-160 TABLET ORAL TWICE DAILY
Qty: 6 | Refills: 0 | Status: COMPLETED | COMMUNITY
Start: 2019-05-14 | End: 2019-05-16

## 2019-05-16 NOTE — HISTORY OF PRESENT ILLNESS
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 9/2015\par \par Previous Therapy\par 1) Endometrial Biopsy 8/2014\par        a) Benign endometrial polyp\par 2) Exploratory laparotomy, total abdominal hysterectomy, bilateral salpingo-oophorectomy, omentectomy, pelvic and para aortic lymphadenectomy 9/9/2015\par      a) Borderline endometrioid adenofibroma with foci of microinvasion of left adnexal mass, 12.4cm, unremarkable fallopian tube, LN 0/9\par     b) Slides reviewed by [facility] [place] - with microscopic foci of endometrioid adenocarcinoma in background of borderline L ovarian tumor\par 3) Vaginal Biopsy 4/2016\par     a)Granulation tissue\par 4) PET/CT 2/3/17  FAMILIA\par 5) Colonoscopy normal 1/2017\par 6)CEA= 4.9 on 8/14/2017, has been persistently elevated even while FAMILIA most recent 3.7\par 7) Hospitalized at [facility] 2/2019 with SBO and incarcerated hernia\par    a) Diagnostic laparoscopy with Dr. Ag and Dr. Vines\par   b) Patient FAMILIA, with incarcerated ventral hernia with mesh placement 2/19/19\par \par Patient here for follow-up. C/o UTI-like symptoms. \par \par Health Maintenance\par Mammogram 2/2016\par Colonoscopy 2017- repeat 5 years

## 2019-05-16 NOTE — DISCUSSION/SUMMARY
[FreeTextEntry1] : Urinalysis, Urine culture sent today\par Will call with results to determine plan of care\par Follow up exam due in September

## 2019-05-16 NOTE — PHYSICAL EXAM
[Abnormal] : Examination for hernias: Abnormal [Abdomen Hernia Ventral] : A(n) [] : incarcerated [Absent] : Adnexa(ae): Absent [Normal] : Recto-Vaginal Exam: Normal [Fully active, able to carry on all pre-disease performance without restriction] : Status 0 - Fully active, able to carry on all pre-disease performance without restriction [de-identified] : SSE and BME: intact vaginal cuff. no pelvic masses

## 2019-05-29 NOTE — PATIENT PROFILE ADULT. - VISION (WITH CORRECTIVE LENSES IF THE PATIENT USUALLY WEARS THEM):
Normal vision: sees adequately in most situations; can see medication labels, newsprint no previous reaction

## 2019-06-01 ENCOUNTER — OUTPATIENT (OUTPATIENT)
Dept: OUTPATIENT SERVICES | Facility: HOSPITAL | Age: 74
LOS: 1 days | End: 2019-06-01
Payer: MEDICARE

## 2019-06-18 ENCOUNTER — INPATIENT (INPATIENT)
Facility: HOSPITAL | Age: 74
LOS: 1 days | Discharge: HOME | End: 2019-06-20
Attending: SURGERY | Admitting: SURGERY
Payer: MEDICARE

## 2019-06-18 VITALS
SYSTOLIC BLOOD PRESSURE: 168 MMHG | TEMPERATURE: 98 F | OXYGEN SATURATION: 100 % | DIASTOLIC BLOOD PRESSURE: 85 MMHG | HEART RATE: 63 BPM

## 2019-06-18 DIAGNOSIS — Z90.49 ACQUIRED ABSENCE OF OTHER SPECIFIED PARTS OF DIGESTIVE TRACT: Chronic | ICD-10-CM

## 2019-06-18 DIAGNOSIS — Z90.710 ACQUIRED ABSENCE OF BOTH CERVIX AND UTERUS: Chronic | ICD-10-CM

## 2019-06-18 DIAGNOSIS — Z98.890 OTHER SPECIFIED POSTPROCEDURAL STATES: Chronic | ICD-10-CM

## 2019-06-18 DIAGNOSIS — K56.609 UNSPECIFIED INTESTINAL OBSTRUCTION, UNSPECIFIED AS TO PARTIAL VERSUS COMPLETE OBSTRUCTION: ICD-10-CM

## 2019-06-18 LAB
ALBUMIN SERPL ELPH-MCNC: 4.3 G/DL — SIGNIFICANT CHANGE UP (ref 3.3–5)
ALP SERPL-CCNC: 91 U/L — SIGNIFICANT CHANGE UP (ref 40–120)
ALT FLD-CCNC: 22 U/L — SIGNIFICANT CHANGE UP (ref 4–33)
ANION GAP SERPL CALC-SCNC: 11 MMO/L — SIGNIFICANT CHANGE UP (ref 7–14)
APPEARANCE UR: CLEAR — SIGNIFICANT CHANGE UP
APTT BLD: 29.3 SEC — SIGNIFICANT CHANGE UP (ref 27.5–36.3)
AST SERPL-CCNC: 19 U/L — SIGNIFICANT CHANGE UP (ref 4–32)
BACTERIA # UR AUTO: HIGH
BASE EXCESS BLDV CALC-SCNC: 0.4 MMOL/L — SIGNIFICANT CHANGE UP
BASOPHILS # BLD AUTO: 0.03 K/UL — SIGNIFICANT CHANGE UP (ref 0–0.2)
BASOPHILS NFR BLD AUTO: 0.3 % — SIGNIFICANT CHANGE UP (ref 0–2)
BILIRUB SERPL-MCNC: 0.6 MG/DL — SIGNIFICANT CHANGE UP (ref 0.2–1.2)
BILIRUB UR-MCNC: NEGATIVE — SIGNIFICANT CHANGE UP
BLD GP AB SCN SERPL QL: NEGATIVE — SIGNIFICANT CHANGE UP
BLOOD GAS VENOUS - CREATININE: 0.75 MG/DL — SIGNIFICANT CHANGE UP (ref 0.5–1.3)
BLOOD GAS VENOUS - FIO2: 21 — SIGNIFICANT CHANGE UP
BLOOD UR QL VISUAL: NEGATIVE — SIGNIFICANT CHANGE UP
BUN SERPL-MCNC: 24 MG/DL — HIGH (ref 7–23)
CALCIUM SERPL-MCNC: 9.9 MG/DL — SIGNIFICANT CHANGE UP (ref 8.4–10.5)
CHLORIDE BLDV-SCNC: 108 MMOL/L — SIGNIFICANT CHANGE UP (ref 96–108)
CHLORIDE SERPL-SCNC: 105 MMOL/L — SIGNIFICANT CHANGE UP (ref 98–107)
CO2 SERPL-SCNC: 24 MMOL/L — SIGNIFICANT CHANGE UP (ref 22–31)
COLOR SPEC: YELLOW — SIGNIFICANT CHANGE UP
CREAT SERPL-MCNC: 0.75 MG/DL — SIGNIFICANT CHANGE UP (ref 0.5–1.3)
EOSINOPHIL # BLD AUTO: 0.03 K/UL — SIGNIFICANT CHANGE UP (ref 0–0.5)
EOSINOPHIL NFR BLD AUTO: 0.3 % — SIGNIFICANT CHANGE UP (ref 0–6)
GAS PNL BLDV: 139 MMOL/L — SIGNIFICANT CHANGE UP (ref 136–146)
GLUCOSE BLDC GLUCOMTR-MCNC: 118 MG/DL — HIGH (ref 70–99)
GLUCOSE BLDV-MCNC: 130 MG/DL — HIGH (ref 70–99)
GLUCOSE SERPL-MCNC: 139 MG/DL — HIGH (ref 70–99)
GLUCOSE UR-MCNC: NEGATIVE — SIGNIFICANT CHANGE UP
HCO3 BLDV-SCNC: 23 MMOL/L — SIGNIFICANT CHANGE UP (ref 20–27)
HCT VFR BLD CALC: 36.4 % — SIGNIFICANT CHANGE UP (ref 34.5–45)
HCT VFR BLDV CALC: 38.9 % — SIGNIFICANT CHANGE UP (ref 34.5–45)
HGB BLD-MCNC: 12.3 G/DL — SIGNIFICANT CHANGE UP (ref 11.5–15.5)
HGB BLDV-MCNC: 12.7 G/DL — SIGNIFICANT CHANGE UP (ref 11.5–15.5)
HYALINE CASTS # UR AUTO: NEGATIVE — SIGNIFICANT CHANGE UP
IMM GRANULOCYTES NFR BLD AUTO: 0.3 % — SIGNIFICANT CHANGE UP (ref 0–1.5)
INR BLD: 1.01 — SIGNIFICANT CHANGE UP (ref 0.88–1.17)
KETONES UR-MCNC: NEGATIVE — SIGNIFICANT CHANGE UP
LACTATE BLDV-MCNC: 1.4 MMOL/L — SIGNIFICANT CHANGE UP (ref 0.5–2)
LEUKOCYTE ESTERASE UR-ACNC: SIGNIFICANT CHANGE UP
LIDOCAIN IGE QN: 29.7 U/L — SIGNIFICANT CHANGE UP (ref 7–60)
LYMPHOCYTES # BLD AUTO: 0.62 K/UL — LOW (ref 1–3.3)
LYMPHOCYTES # BLD AUTO: 6.9 % — LOW (ref 13–44)
MCHC RBC-ENTMCNC: 31.1 PG — SIGNIFICANT CHANGE UP (ref 27–34)
MCHC RBC-ENTMCNC: 33.8 % — SIGNIFICANT CHANGE UP (ref 32–36)
MCV RBC AUTO: 91.9 FL — SIGNIFICANT CHANGE UP (ref 80–100)
MONOCYTES # BLD AUTO: 0.34 K/UL — SIGNIFICANT CHANGE UP (ref 0–0.9)
MONOCYTES NFR BLD AUTO: 3.8 % — SIGNIFICANT CHANGE UP (ref 2–14)
NEUTROPHILS # BLD AUTO: 8 K/UL — HIGH (ref 1.8–7.4)
NEUTROPHILS NFR BLD AUTO: 88.4 % — HIGH (ref 43–77)
NITRITE UR-MCNC: POSITIVE — HIGH
NRBC # FLD: 0 K/UL — SIGNIFICANT CHANGE UP (ref 0–0)
PCO2 BLDV: 49 MMHG — SIGNIFICANT CHANGE UP (ref 41–51)
PH BLDV: 7.34 PH — SIGNIFICANT CHANGE UP (ref 7.32–7.43)
PH UR: 6 — SIGNIFICANT CHANGE UP (ref 5–8)
PLATELET # BLD AUTO: 186 K/UL — SIGNIFICANT CHANGE UP (ref 150–400)
PMV BLD: 9.5 FL — SIGNIFICANT CHANGE UP (ref 7–13)
PO2 BLDV: 23 MMHG — LOW (ref 35–40)
POTASSIUM BLDV-SCNC: 4.3 MMOL/L — SIGNIFICANT CHANGE UP (ref 3.4–4.5)
POTASSIUM SERPL-MCNC: 4.7 MMOL/L — SIGNIFICANT CHANGE UP (ref 3.5–5.3)
POTASSIUM SERPL-SCNC: 4.7 MMOL/L — SIGNIFICANT CHANGE UP (ref 3.5–5.3)
PROT SERPL-MCNC: 6.7 G/DL — SIGNIFICANT CHANGE UP (ref 6–8.3)
PROT UR-MCNC: 70 — SIGNIFICANT CHANGE UP
PROTHROM AB SERPL-ACNC: 11.5 SEC — SIGNIFICANT CHANGE UP (ref 9.8–13.1)
RBC # BLD: 3.96 M/UL — SIGNIFICANT CHANGE UP (ref 3.8–5.2)
RBC # FLD: 13.2 % — SIGNIFICANT CHANGE UP (ref 10.3–14.5)
RBC CASTS # UR COMP ASSIST: SIGNIFICANT CHANGE UP (ref 0–?)
RH IG SCN BLD-IMP: POSITIVE — SIGNIFICANT CHANGE UP
SAO2 % BLDV: 36 % — LOW (ref 60–85)
SODIUM SERPL-SCNC: 140 MMOL/L — SIGNIFICANT CHANGE UP (ref 135–145)
SP GR SPEC: 1.02 — SIGNIFICANT CHANGE UP (ref 1–1.04)
SQUAMOUS # UR AUTO: SIGNIFICANT CHANGE UP
UROBILINOGEN FLD QL: NORMAL — SIGNIFICANT CHANGE UP
WBC # BLD: 9.05 K/UL — SIGNIFICANT CHANGE UP (ref 3.8–10.5)
WBC # FLD AUTO: 9.05 K/UL — SIGNIFICANT CHANGE UP (ref 3.8–10.5)
WBC UR QL: HIGH (ref 0–?)

## 2019-06-18 PROCEDURE — 99222 1ST HOSP IP/OBS MODERATE 55: CPT

## 2019-06-18 PROCEDURE — 71045 X-RAY EXAM CHEST 1 VIEW: CPT | Mod: 26

## 2019-06-18 PROCEDURE — 74177 CT ABD & PELVIS W/CONTRAST: CPT | Mod: 26

## 2019-06-18 PROCEDURE — 76705 ECHO EXAM OF ABDOMEN: CPT | Mod: 26

## 2019-06-18 RX ORDER — SODIUM CHLORIDE 9 MG/ML
1000 INJECTION INTRAMUSCULAR; INTRAVENOUS; SUBCUTANEOUS ONCE
Refills: 0 | Status: COMPLETED | OUTPATIENT
Start: 2019-06-18 | End: 2019-06-18

## 2019-06-18 RX ORDER — ENOXAPARIN SODIUM 100 MG/ML
40 INJECTION SUBCUTANEOUS DAILY
Refills: 0 | Status: DISCONTINUED | OUTPATIENT
Start: 2019-06-18 | End: 2019-06-20

## 2019-06-18 RX ORDER — LINAGLIPTIN AND METFORMIN HYDROCHLORIDE 2.5; 85 MG/1; MG/1
1 TABLET, FILM COATED ORAL
Qty: 0 | Refills: 0 | DISCHARGE

## 2019-06-18 RX ORDER — LEVOTHYROXINE SODIUM 125 MCG
40 TABLET ORAL AT BEDTIME
Refills: 0 | Status: DISCONTINUED | OUTPATIENT
Start: 2019-06-18 | End: 2019-06-20

## 2019-06-18 RX ORDER — ACETAMINOPHEN 500 MG
650 TABLET ORAL ONCE
Refills: 0 | Status: COMPLETED | OUTPATIENT
Start: 2019-06-18 | End: 2019-06-18

## 2019-06-18 RX ORDER — CEFTRIAXONE 500 MG/1
1000 INJECTION, POWDER, FOR SOLUTION INTRAMUSCULAR; INTRAVENOUS ONCE
Refills: 0 | Status: COMPLETED | OUTPATIENT
Start: 2019-06-18 | End: 2019-06-18

## 2019-06-18 RX ORDER — ERGOCALCIFEROL 1.25 MG/1
1 CAPSULE ORAL
Qty: 0 | Refills: 0 | DISCHARGE

## 2019-06-18 RX ORDER — SODIUM CHLORIDE 9 MG/ML
1000 INJECTION, SOLUTION INTRAVENOUS
Refills: 0 | Status: DISCONTINUED | OUTPATIENT
Start: 2019-06-18 | End: 2019-06-19

## 2019-06-18 RX ORDER — CEFTRIAXONE 500 MG/1
1000 INJECTION, POWDER, FOR SOLUTION INTRAMUSCULAR; INTRAVENOUS EVERY 24 HOURS
Refills: 0 | Status: DISCONTINUED | OUTPATIENT
Start: 2019-06-19 | End: 2019-06-20

## 2019-06-18 RX ADMIN — SODIUM CHLORIDE 100 MILLILITER(S): 9 INJECTION, SOLUTION INTRAVENOUS at 23:07

## 2019-06-18 RX ADMIN — ENOXAPARIN SODIUM 40 MILLIGRAM(S): 100 INJECTION SUBCUTANEOUS at 23:06

## 2019-06-18 RX ADMIN — CEFTRIAXONE 100 MILLIGRAM(S): 500 INJECTION, POWDER, FOR SOLUTION INTRAMUSCULAR; INTRAVENOUS at 19:00

## 2019-06-18 RX ADMIN — Medication 650 MILLIGRAM(S): at 17:27

## 2019-06-18 RX ADMIN — Medication 650 MILLIGRAM(S): at 14:31

## 2019-06-18 RX ADMIN — SODIUM CHLORIDE 1000 MILLILITER(S): 9 INJECTION INTRAMUSCULAR; INTRAVENOUS; SUBCUTANEOUS at 17:27

## 2019-06-18 NOTE — ED ADULT NURSE NOTE - ED STAT RN HANDOFF DETAILS
Report given to YOBANY Layne pt in [facility], to be placed for transport. Report given to RN Misty, pt in NAD, to be placed for transport.

## 2019-06-18 NOTE — H&P ADULT - NSHPPHYSICALEXAM_GEN_ALL_CORE
Vital Signs Last 24 Hrs  T(C): 36.1 (18 Jun 2019 20:11), Max: 36.7 (18 Jun 2019 16:23)  T(F): 97 (18 Jun 2019 20:11), Max: 98 (18 Jun 2019 16:23)  HR: 55 (18 Jun 2019 20:11) (55 - 78)  BP: 141/57 (18 Jun 2019 20:11) (141/57 - 168/85)  BP(mean): --  RR: 18 (18 Jun 2019 20:11) (17 - 18)  SpO2: 100% (18 Jun 2019 20:11) (100% - 100%)    General: NAD, resting comfortably  Resp: unlabored breathing on RA  CV: HDS, rrr  Abd: soft, nondistended, mildly tender to b/l lower quadrants L > R  Extr: wwp

## 2019-06-18 NOTE — ED PROVIDER NOTE - CLINICAL SUMMARY MEDICAL DECISION MAKING FREE TEXT BOX
Franklin: H/o abd hernia repair. C/o severe abd pain and N/V. Abd tympanitic. Concern for SBO. CT, labs.

## 2019-06-18 NOTE — ED ADULT NURSE NOTE - OBJECTIVE STATEMENT
Pt presents to rm 24, A&Ox4, ambulatory w/o assistance, pmhx of HTN, hypothyroidism, DM, here for evaluation of abdominal pain, lightheadedness nausea, and vomiting (x3) that started this morning. Denies chest pain, shortness of breath, palpitations, diaphoresis, headaches, fevers, dizziness, vomiting, diarrhea, or urinary symptoms at this time. Pt presents to rm 24, A&Ox4, ambulatory w/o assistance, pmhx of HTN, hypothyroidism, DM, here for evaluation of abdominal pain, lightheadedness nausea, and vomiting (x3) that started this morning. Pt states nausea has resolved but has a headache at this time. Denies chest pain, shortness of breath, palpitations, diaphoresis, fevers, dizziness, vomiting, diarrhea, or urinary symptoms at this time. IV established in left AC with a 18G, labs drawn and sent, call bell in reach, warm blanket provided, bed in lowest position, side rails up x2, MD evaluation in progress.  Will continue to monitor.

## 2019-06-18 NOTE — ED ADULT NURSE REASSESSMENT NOTE - NS ED NURSE REASSESS COMMENT FT1
Pt resting comfortably in bed, pt continues to c/o headache at this time, BP and HR low- MD Hernandez aware, awaiting orders, pt stable, in NAD, will continue to monitor.

## 2019-06-18 NOTE — H&P ADULT - NSICDXPASTSURGICALHX_GEN_ALL_CORE_FT
PAST SURGICAL HISTORY:  H/O ventral hernia repair w/ mesh    S/P Appendectomy     S/P cholecystectomy     S/P hysterectomy

## 2019-06-18 NOTE — ED ADULT NURSE NOTE - INTERVENTIONS DEFINITIONS
Physically safe environment: no spills, clutter or unnecessary equipment/Stretcher in lowest position, wheels locked, appropriate side rails in place/Call bell, personal items and telephone within reach/Non-slip footwear when patient is off stretcher

## 2019-06-18 NOTE — ED PROVIDER NOTE - PROGRESS NOTE DETAILS
David PGY2: pt has not had recurrence of abd pain, abd soft, ntnd, awaiting ct as concern for partial obstruction given tympany observed on abdominal exam.

## 2019-06-18 NOTE — H&P ADULT - NSICDXPASTMEDICALHX_GEN_ALL_CORE_FT
PAST MEDICAL HISTORY:  Essential hypertension HTN (hypertension)    Hypothyroidism     Obesity     Type 2 diabetes mellitus diet controlled

## 2019-06-18 NOTE — H&P ADULT - HISTORY OF PRESENT ILLNESS
74F w/ HTN, DM (diet controlled), h/o appendectomy, lap nereyda, hysterectomy, incisional hernia repair (02/2019), now presents with small bowel obstruction. Patient states that she woke with abdominal pain this morning ~3 AM, worst in the b/l lower quadrants. She endorses nausea, emesis this AM, and continued flatus/multiple BMs this AM. She reports that she feels a little better since she presented to the ED.     In [facility] ED, patient's vitals stable, labs notable for leukocytosis to 9.0, venous lactate 1.4. CT abdomen/pelvis demonstrates small bowel obstruction in the right pelvis.

## 2019-06-18 NOTE — H&P ADULT - NSHPLABSRESULTS_GEN_ALL_CORE
CBC ( @ 14:26)                          12.3                     9.05    )--------------(  186        88.4<H>% Neuts, 6.9<L>% Lymphs, ANC: 8.00<H>                          36.4      BMP ( @ 14:26)       140     |  105     |  24<H> 			Ca++ --      Ca 9.9          ---------------------------------( 139<H>		Mg --           4.7     |  24      |  0.75  			Ph --        LFTs ( @ 14:26)      TPro 6.7 / Alb 4.3 / TBili 0.6 / DBili -- / AST 19 / ALT 22 / AlkPhos 91    Coags ( @ 21:49)  aPTT 29.3 / INR 1.01 / PT 11.5    VBG ( @ 14:26)     7.34 / 49 / 23<L> / 23 / 0.4 / 36.0<L>%      Lactate: 1.4    Urinalysis ( @ 16:45):     Color: YELLOW / Appearance: CLEAR / S.019 / pH: 6.0 / Gluc: NEGATIVE / Ketones: NEGATIVE / Bili: NEGATIVE / Urobili: NORMAL / Protein :70 / Nitrites: POSITIVE<H> / Leuk.Est: TRACE / RBC: 0-2 / WBC: 6-10<H> / Sq Epi: OCC / Non Sq Epi:  / Bacteria MODERATE<H>       < from: CT Abdomen and Pelvis w/ Oral Cont and w/ IV Cont (19 @ 19:49) >    LOWER CHEST: Within normal limits.    LIVER: Within normal limits.  BILE DUCTS: Normal caliber.  GALLBLADDER: Cholecystectomy.  SPLEEN: Within normal limits.  PANCREAS: Within normal limits.  ADRENALS: Within normal limits.  KIDNEYS/URETERS: Bilateral renal cortical scarring, cysts, subcentimeter   hypodensities.    BLADDER: Within normal limits.  REPRODUCTIVE ORGANS: Hysterectomy.    BOWEL: Distended loops of small bowel with transition point in the right   pelvis (2:88 and 602:53). The more distal small bowel loops are   relatively decompressed. Findings are concerning for small bowel   obstruction. Appendix are not seen.  PERITONEUM: A small amount of free fluid is present in the left lower   abdomen. No pneumoperitoneum.  VESSELS:  Within normal limits.  RETROPERITONEUM: No lymphadenopathy.    ABDOMINAL WALL: Within normal limits.  BONES: Degenerative change.    IMPRESSION:     Findings suspicious for small bowel obstruction with transition point in   the right pelvis.    KAVITHA GARAY M.D., RADIOLOGY RESIDENT  This document has been electronically signed.  MOIRA BARLOW M.D., ATTENDING RADIOLOGIST  This document has been electronically signed. 2019  8:49PM    < end of copied text >

## 2019-06-18 NOTE — ED PROVIDER NOTE - ATTENDING CONTRIBUTION TO CARE
I performed a face-to-face evaluation of the patient and performed a history and physical examination. I agree with the history and physical examination.    Franklin: H/o abd hernia repair. C/o severe abd pain and N/V. Abd tympanitic. Concern for SBO. CT, labs.

## 2019-06-18 NOTE — H&P ADULT - ASSESSMENT
ASSESSMENT:   74F w/ HTN, DM (diet controlled), h/o appendectomy, lap nereyda, hysterectomy, incisional hernia repair (02/2019), now presents with small bowel obstruction.     - Admit to B team surgery service under Dr. Patel  - NPO + NGT to W  - IVF  - No standing pain medication, patient must be assessed by surgical team prior to administration of pain medication   - Lovenox for DVT prophylaxis   - AM labs  - Small bowel follow through     Discussed with Dr. Garay [facility] PGY-2  Surgery Pager [phone]

## 2019-06-18 NOTE — ED PROVIDER NOTE - OBJECTIVE STATEMENT
74F hx of HTN, DM (now not on medications as its controlled), appendectomy, cataract surgery, hernia repair with mesh, presenting with nausea and vomiting at 4am, 8/10 pain at the RUQ and the LUQ, noted as sharp, constant but with some relief with multiple bowel movements - nonbloody stools and nonbloody emesis. Still passing gas. No dysuria. 74F hx of HTN, DM (now not on medications as its controlled), appendectomy, cataract surgery, hernia repair with mesh, presenting with nausea and vomiting at 4am, 8/10 pain at the RUQ and the LUQ, noted as sharp, constant but with some relief with multiple bowel movements - nonbloody stools and nonbloody emesis. Still passing gas. No dysuria but has had increased urinary frequency and hx of UTIs.

## 2019-06-19 LAB
ANION GAP SERPL CALC-SCNC: 12 MMO/L — SIGNIFICANT CHANGE UP (ref 7–14)
BUN SERPL-MCNC: 20 MG/DL — SIGNIFICANT CHANGE UP (ref 7–23)
CALCIUM SERPL-MCNC: 9.3 MG/DL — SIGNIFICANT CHANGE UP (ref 8.4–10.5)
CHLORIDE SERPL-SCNC: 104 MMOL/L — SIGNIFICANT CHANGE UP (ref 98–107)
CO2 SERPL-SCNC: 23 MMOL/L — SIGNIFICANT CHANGE UP (ref 22–31)
CREAT SERPL-MCNC: 0.75 MG/DL — SIGNIFICANT CHANGE UP (ref 0.5–1.3)
GLUCOSE BLDC GLUCOMTR-MCNC: 118 MG/DL — HIGH (ref 70–99)
GLUCOSE BLDC GLUCOMTR-MCNC: 118 MG/DL — HIGH (ref 70–99)
GLUCOSE BLDC GLUCOMTR-MCNC: 126 MG/DL — HIGH (ref 70–99)
GLUCOSE SERPL-MCNC: 118 MG/DL — HIGH (ref 70–99)
HCT VFR BLD CALC: 34.3 % — LOW (ref 34.5–45)
HCV AB S/CO SERPL IA: 0.08 S/CO — SIGNIFICANT CHANGE UP (ref 0–0.99)
HCV AB SERPL-IMP: SIGNIFICANT CHANGE UP
HGB BLD-MCNC: 11.6 G/DL — SIGNIFICANT CHANGE UP (ref 11.5–15.5)
MAGNESIUM SERPL-MCNC: 1.7 MG/DL — SIGNIFICANT CHANGE UP (ref 1.6–2.6)
MCHC RBC-ENTMCNC: 30.8 PG — SIGNIFICANT CHANGE UP (ref 27–34)
MCHC RBC-ENTMCNC: 33.8 % — SIGNIFICANT CHANGE UP (ref 32–36)
MCV RBC AUTO: 91 FL — SIGNIFICANT CHANGE UP (ref 80–100)
NRBC # FLD: 0 K/UL — SIGNIFICANT CHANGE UP (ref 0–0)
PHOSPHATE SERPL-MCNC: 3.2 MG/DL — SIGNIFICANT CHANGE UP (ref 2.5–4.5)
PLATELET # BLD AUTO: 194 K/UL — SIGNIFICANT CHANGE UP (ref 150–400)
PMV BLD: 10.1 FL — SIGNIFICANT CHANGE UP (ref 7–13)
POTASSIUM SERPL-MCNC: 3.8 MMOL/L — SIGNIFICANT CHANGE UP (ref 3.5–5.3)
POTASSIUM SERPL-SCNC: 3.8 MMOL/L — SIGNIFICANT CHANGE UP (ref 3.5–5.3)
RBC # BLD: 3.77 M/UL — LOW (ref 3.8–5.2)
RBC # FLD: 13.2 % — SIGNIFICANT CHANGE UP (ref 10.3–14.5)
SODIUM SERPL-SCNC: 139 MMOL/L — SIGNIFICANT CHANGE UP (ref 135–145)
WBC # BLD: 4.48 K/UL — SIGNIFICANT CHANGE UP (ref 3.8–10.5)
WBC # FLD AUTO: 4.48 K/UL — SIGNIFICANT CHANGE UP (ref 3.8–10.5)

## 2019-06-19 PROCEDURE — 74250 X-RAY XM SM INT 1CNTRST STD: CPT | Mod: 26

## 2019-06-19 PROCEDURE — 71045 X-RAY EXAM CHEST 1 VIEW: CPT | Mod: 26

## 2019-06-19 RX ORDER — GLUCAGON INJECTION, SOLUTION 0.5 MG/.1ML
1 INJECTION, SOLUTION SUBCUTANEOUS ONCE
Refills: 0 | Status: DISCONTINUED | OUTPATIENT
Start: 2019-06-19 | End: 2019-06-20

## 2019-06-19 RX ORDER — LOSARTAN POTASSIUM 100 MG/1
100 TABLET, FILM COATED ORAL DAILY
Refills: 0 | Status: DISCONTINUED | OUTPATIENT
Start: 2019-06-19 | End: 2019-06-20

## 2019-06-19 RX ORDER — DEXTROSE MONOHYDRATE, SODIUM CHLORIDE, AND POTASSIUM CHLORIDE 50; .745; 4.5 G/1000ML; G/1000ML; G/1000ML
1000 INJECTION, SOLUTION INTRAVENOUS
Refills: 0 | Status: DISCONTINUED | OUTPATIENT
Start: 2019-06-19 | End: 2019-06-20

## 2019-06-19 RX ORDER — INSULIN LISPRO 100/ML
VIAL (ML) SUBCUTANEOUS EVERY 6 HOURS
Refills: 0 | Status: DISCONTINUED | OUTPATIENT
Start: 2019-06-19 | End: 2019-06-20

## 2019-06-19 RX ORDER — POTASSIUM CHLORIDE 20 MEQ
10 PACKET (EA) ORAL
Refills: 0 | Status: COMPLETED | OUTPATIENT
Start: 2019-06-19 | End: 2019-06-19

## 2019-06-19 RX ORDER — DEXTROSE 50 % IN WATER 50 %
25 SYRINGE (ML) INTRAVENOUS ONCE
Refills: 0 | Status: DISCONTINUED | OUTPATIENT
Start: 2019-06-19 | End: 2019-06-20

## 2019-06-19 RX ORDER — MAGNESIUM SULFATE 500 MG/ML
2 VIAL (ML) INJECTION ONCE
Refills: 0 | Status: COMPLETED | OUTPATIENT
Start: 2019-06-19 | End: 2019-06-19

## 2019-06-19 RX ORDER — DEXTROSE 50 % IN WATER 50 %
15 SYRINGE (ML) INTRAVENOUS ONCE
Refills: 0 | Status: DISCONTINUED | OUTPATIENT
Start: 2019-06-19 | End: 2019-06-20

## 2019-06-19 RX ORDER — SODIUM CHLORIDE 9 MG/ML
1000 INJECTION, SOLUTION INTRAVENOUS
Refills: 0 | Status: DISCONTINUED | OUTPATIENT
Start: 2019-06-19 | End: 2019-06-20

## 2019-06-19 RX ORDER — DEXTROSE 50 % IN WATER 50 %
12.5 SYRINGE (ML) INTRAVENOUS ONCE
Refills: 0 | Status: DISCONTINUED | OUTPATIENT
Start: 2019-06-19 | End: 2019-06-20

## 2019-06-19 RX ADMIN — Medication 100 MILLIEQUIVALENT(S): at 10:55

## 2019-06-19 RX ADMIN — ENOXAPARIN SODIUM 40 MILLIGRAM(S): 100 INJECTION SUBCUTANEOUS at 12:40

## 2019-06-19 RX ADMIN — Medication 50 GRAM(S): at 17:33

## 2019-06-19 RX ADMIN — CEFTRIAXONE 100 MILLIGRAM(S): 500 INJECTION, POWDER, FOR SOLUTION INTRAMUSCULAR; INTRAVENOUS at 19:08

## 2019-06-19 RX ADMIN — DEXTROSE MONOHYDRATE, SODIUM CHLORIDE, AND POTASSIUM CHLORIDE 100 MILLILITER(S): 50; .745; 4.5 INJECTION, SOLUTION INTRAVENOUS at 20:50

## 2019-06-19 RX ADMIN — Medication 40 MICROGRAM(S): at 21:56

## 2019-06-19 RX ADMIN — DEXTROSE MONOHYDRATE, SODIUM CHLORIDE, AND POTASSIUM CHLORIDE 100 MILLILITER(S): 50; .745; 4.5 INJECTION, SOLUTION INTRAVENOUS at 19:08

## 2019-06-19 RX ADMIN — Medication 100 MILLIEQUIVALENT(S): at 12:39

## 2019-06-19 NOTE — PROGRESS NOTE ADULT - ASSESSMENT
ASSESSMENT:   74F w/ HTN, DM (diet controlled), h/o appendectomy, lap nereyda, hysterectomy, incisional hernia repair (02/2019), now presents with small bowel obstruction.     - NPO + NGT to LCW  - IVF  - No standing pain medication, patient must be assessed by surgical team prior to administration of pain medication   - Lovenox for DVT prophylaxis   - AM labs  - SBFT today    B Surgery [id number]

## 2019-06-19 NOTE — PROGRESS NOTE ADULT - SUBJECTIVE AND OBJECTIVE BOX
GENERAL SURGERY DAILY PROGRESS NOTE:     Subjective:  Pt seen and examined. No acute events overnight. Pain controlled. Reports a few episdoes of flatus on admission but nothing since. Denies n/v at this time.     Objective:    MEDICATIONS  (STANDING):  cefTRIAXone   IVPB 1000 milliGRAM(s) IV Intermittent every 24 hours  enoxaparin Injectable 40 milliGRAM(s) SubCutaneous daily  lactated ringers. 1000 milliLiter(s) (100 mL/Hr) IV Continuous <Continuous>  levothyroxine Injectable 40 MICROGram(s) IV Push at bedtime    MEDICATIONS  (PRN):      Vital Signs Last 24 Hrs  T(C): 36.5 (2019 05:14), Max: 36.7 (2019 16:23)  T(F): 97.7 (2019 05:14), Max: 98.1 (2019 02:00)  HR: 58 (2019 05:14) (55 - 78)  BP: 158/60 (2019 05:14) (141/57 - 168/85)  BP(mean): --  RR: 14 (2019 05:14) (14 - 18)  SpO2: 100% (2019 05:14) (99% - 100%)    I&O's Detail    2019 07:01  -  2019 07:00  --------------------------------------------------------  IN:    lactated ringers.: 600 mL  Total IN: 600 mL    OUT:    Nasoenteral Tube: 250 mL    Voided: 300 mL  Total OUT: 550 mL    Total NET: 50 mL    PHYSICAL EXAM  NAD, awake and alert  Respirations nonlabored  Abdomen soft, nontender, mildly distended, NGT in place w/ bilious output  No guarding or rebound tenderness        Daily Height in cm: 160.02 (2019 02:07)    Daily     LABS:                        11.6   4.48  )-----------( 194      ( 2019 06:30 )             34.3     06-18    140  |  105  |  24<H>  ----------------------------<  139<H>  4.7   |  24  |  0.75    Ca    9.9      2019 14:26    TPro  6.7  /  Alb  4.3  /  TBili  0.6  /  DBili  x   /  AST  19  /  ALT  22  /  AlkPhos  91  06-18    PT/INR - ( 2019 21:49 )   PT: 11.5 SEC;   INR: 1.01          PTT - ( 2019 21:49 )  PTT:29.3 SEC  Urinalysis Basic - ( 2019 16:45 )    Color: YELLOW / Appearance: CLEAR / S.019 / pH: 6.0  Gluc: NEGATIVE / Ketone: NEGATIVE  / Bili: NEGATIVE / Urobili: NORMAL   Blood: NEGATIVE / Protein: 70 / Nitrite: POSITIVE   Leuk Esterase: TRACE / RBC: 0-2 / WBC 6-10   Sq Epi: OCC / Non Sq Epi: x / Bacteria: MODERATE        RADIOLOGY & ADDITIONAL STUDIES:

## 2019-06-20 ENCOUNTER — TRANSCRIPTION ENCOUNTER (OUTPATIENT)
Age: 74
End: 2019-06-20

## 2019-06-20 VITALS
OXYGEN SATURATION: 100 % | HEART RATE: 52 BPM | RESPIRATION RATE: 19 BRPM | TEMPERATURE: 98 F | SYSTOLIC BLOOD PRESSURE: 117 MMHG | DIASTOLIC BLOOD PRESSURE: 65 MMHG

## 2019-06-20 DIAGNOSIS — Z71.89 OTHER SPECIFIED COUNSELING: ICD-10-CM

## 2019-06-20 LAB
ANION GAP SERPL CALC-SCNC: 14 MMO/L — SIGNIFICANT CHANGE UP (ref 7–14)
BUN SERPL-MCNC: 13 MG/DL — SIGNIFICANT CHANGE UP (ref 7–23)
CALCIUM SERPL-MCNC: 9.3 MG/DL — SIGNIFICANT CHANGE UP (ref 8.4–10.5)
CHLORIDE SERPL-SCNC: 105 MMOL/L — SIGNIFICANT CHANGE UP (ref 98–107)
CO2 SERPL-SCNC: 21 MMOL/L — LOW (ref 22–31)
CREAT SERPL-MCNC: 0.73 MG/DL — SIGNIFICANT CHANGE UP (ref 0.5–1.3)
GLUCOSE BLDC GLUCOMTR-MCNC: 123 MG/DL — HIGH (ref 70–99)
GLUCOSE BLDC GLUCOMTR-MCNC: 139 MG/DL — HIGH (ref 70–99)
GLUCOSE BLDC GLUCOMTR-MCNC: 152 MG/DL — HIGH (ref 70–99)
GLUCOSE SERPL-MCNC: 142 MG/DL — HIGH (ref 70–99)
HBA1C BLD-MCNC: 5.9 % — HIGH (ref 4–5.6)
HCT VFR BLD CALC: 33.5 % — LOW (ref 34.5–45)
HGB BLD-MCNC: 11.1 G/DL — LOW (ref 11.5–15.5)
MAGNESIUM SERPL-MCNC: 1.9 MG/DL — SIGNIFICANT CHANGE UP (ref 1.6–2.6)
MCHC RBC-ENTMCNC: 30.5 PG — SIGNIFICANT CHANGE UP (ref 27–34)
MCHC RBC-ENTMCNC: 33.1 % — SIGNIFICANT CHANGE UP (ref 32–36)
MCV RBC AUTO: 92 FL — SIGNIFICANT CHANGE UP (ref 80–100)
NRBC # FLD: 0.02 K/UL — SIGNIFICANT CHANGE UP (ref 0–0)
PHOSPHATE SERPL-MCNC: 3 MG/DL — SIGNIFICANT CHANGE UP (ref 2.5–4.5)
PLATELET # BLD AUTO: 181 K/UL — SIGNIFICANT CHANGE UP (ref 150–400)
PMV BLD: 10 FL — SIGNIFICANT CHANGE UP (ref 7–13)
POTASSIUM SERPL-MCNC: 4.3 MMOL/L — SIGNIFICANT CHANGE UP (ref 3.5–5.3)
POTASSIUM SERPL-SCNC: 4.3 MMOL/L — SIGNIFICANT CHANGE UP (ref 3.5–5.3)
RBC # BLD: 3.64 M/UL — LOW (ref 3.8–5.2)
RBC # FLD: 13.2 % — SIGNIFICANT CHANGE UP (ref 10.3–14.5)
SODIUM SERPL-SCNC: 140 MMOL/L — SIGNIFICANT CHANGE UP (ref 135–145)
SPECIMEN SOURCE: SIGNIFICANT CHANGE UP
WBC # BLD: 3.22 K/UL — LOW (ref 3.8–10.5)
WBC # FLD AUTO: 3.22 K/UL — LOW (ref 3.8–10.5)

## 2019-06-20 RX ORDER — INSULIN LISPRO 100/ML
VIAL (ML) SUBCUTANEOUS
Refills: 0 | Status: DISCONTINUED | OUTPATIENT
Start: 2019-06-20 | End: 2019-06-20

## 2019-06-20 RX ORDER — LEVOTHYROXINE SODIUM 125 MCG
75 TABLET ORAL DAILY
Refills: 0 | Status: DISCONTINUED | OUTPATIENT
Start: 2019-06-20 | End: 2019-06-20

## 2019-06-20 RX ORDER — CIPROFLOXACIN LACTATE 400MG/40ML
1 VIAL (ML) INTRAVENOUS
Qty: 2 | Refills: 0
Start: 2019-06-20 | End: 2019-06-20

## 2019-06-20 RX ORDER — LOSARTAN POTASSIUM 100 MG/1
100 TABLET, FILM COATED ORAL DAILY
Refills: 0 | Status: DISCONTINUED | OUTPATIENT
Start: 2019-06-20 | End: 2019-06-20

## 2019-06-20 RX ADMIN — DEXTROSE MONOHYDRATE, SODIUM CHLORIDE, AND POTASSIUM CHLORIDE 30 MILLILITER(S): 50; .745; 4.5 INJECTION, SOLUTION INTRAVENOUS at 09:16

## 2019-06-20 RX ADMIN — DEXTROSE MONOHYDRATE, SODIUM CHLORIDE, AND POTASSIUM CHLORIDE 100 MILLILITER(S): 50; .745; 4.5 INJECTION, SOLUTION INTRAVENOUS at 05:06

## 2019-06-20 RX ADMIN — ENOXAPARIN SODIUM 40 MILLIGRAM(S): 100 INJECTION SUBCUTANEOUS at 11:32

## 2019-06-20 RX ADMIN — LOSARTAN POTASSIUM 100 MILLIGRAM(S): 100 TABLET, FILM COATED ORAL at 05:06

## 2019-06-20 RX ADMIN — DEXTROSE MONOHYDRATE, SODIUM CHLORIDE, AND POTASSIUM CHLORIDE 30 MILLILITER(S): 50; .745; 4.5 INJECTION, SOLUTION INTRAVENOUS at 11:33

## 2019-06-20 NOTE — DISCHARGE NOTE NURSING/CASE MANAGEMENT/SOCIAL WORK - NSDCDPATPORTLINK_GEN_ALL_CORE
You can access the [facility] Patient Portal, offered by [facility], by registering with the following website: [email or web address]

## 2019-06-20 NOTE — DISCHARGE NOTE PROVIDER - HOSPITAL COURSE
74F w/ HTN, DM (diet controlled), h/o appendectomy, lap nereyda, hysterectomy, incisional hernia repair (02/2019), now presents to [facility] 6/18/19 with small bowel obstruction. Patient states that she woke with abdominal pain morning of admission ~3 AM, worst in the b/l lower quadrants. She endorses nausea, emesis this AM, and continued flatus/multiple BMs this AM. She reports that she feels a little better since she presented to the ED.         In [facility] ED, patient's vitals stable, labs notable for leukocytosis to 9.0, venous lactate 1.4.         Admission CT abd/pelvis (6/18):  Findings suspicious for small bowel obstruction with transition point in the right pelvis.        Patient was admitted to the surgical service, made NPO, NGT placed and started on IVF.        HD 2 diet was advanced with good tolerance.         Pt currently ambulating, voiding, tolerating a regular diet, without pain. Patient is stable for discharge home to follow up as an outpatient, instructed to call to schedule appointment.

## 2019-06-20 NOTE — DISCHARGE NOTE PROVIDER - CARE PROVIDER_API CALL
Pavan Maldonado)  Dietitian nutritionist; Surgery; Surgical Critical Care  [address] [facility], [address] [address]  Phone: [phone]  Fax: [phone]  Follow Up Time:

## 2019-06-20 NOTE — PROGRESS NOTE ADULT - SUBJECTIVE AND OBJECTIVE BOX
General Surgery Progress Note    SUBJECTIVE:  The patient was seen and examined. No acute events overnight. Pain controlled.  Yesterday, Small Bowel follow through showed contrast in colon. NGT d/c'd  +flatus, -BM  No n/v, cp, sob    OBJECTIVE:     ** VITAL SIGNS / I&O's **    Vital Signs Last 24 Hrs  T(C): 36.7 (2019 05:04), Max: 36.8 (2019 14:41)  T(F): 98 (2019 05:04), Max: 98.2 (2019 14:41)  HR: 55 (2019 05:04) (50 - 83)  BP: 156/60 (2019 05:04) (127/54 - 168/71)  BP(mean): --  RR: 18 (2019 05:04) (16 - 18)  SpO2: 97% (2019 05:04) (96% - 99%)      2019 07:01  -  2019 07:00  --------------------------------------------------------  IN:    dextrose 5% + sodium chloride 0.45% with potassium chloride 20 mEq/L: 2400 mL  Total IN: 2400 mL    OUT:  Total OUT: 0 mL    Total NET: 2400 mL          ** PHYSICAL EXAM **    -- CONSTITUTIONAL: Alert, NAD  -- PULMONARY: non-labored respirations  -- ABDOMEN: soft, non-distended, non-tender  -- NEURO: A&Ox3                          11.1   3.22  )-----------( 181      ( 2019 06:29 )             33.5     2019 06:29    140    |  105    |  13     ----------------------------<  142    4.3     |  21     |  0.73     Ca    9.3        2019 06:29  Phos  3.0       2019 06:29  Mg     1.9       2019 06:29    TPro  6.7    /  Alb  4.3    /  TBili  0.6    /  DBili  x      /  AST  19     /  ALT  22     /  AlkPhos  91     2019 14:26    PT/INR - ( 2019 21:49 )   PT: 11.5 SEC;   INR: 1.01          PTT - ( 2019 21:49 )  PTT:29.3 SEC  CAPILLARY BLOOD GLUCOSE      POCT Blood Glucose.: 123 mg/dL (2019 08:50)  POCT Blood Glucose.: 139 mg/dL (2019 06:24)  POCT Blood Glucose.: 118 mg/dL (2019 23:20)  POCT Blood Glucose.: 118 mg/dL (2019 18:00)  POCT Blood Glucose.: 126 mg/dL (2019 12:35)        LIVER FUNCTIONS - ( 2019 14:26 )  Alb: 4.3 g/dL / Pro: 6.7 g/dL / ALK PHOS: 91 u/L / ALT: 22 u/L / AST: 19 u/L / GGT: x             Urinalysis Basic - ( 2019 16:45 )    Color: YELLOW / Appearance: CLEAR / S.019 / pH: 6.0  Gluc: NEGATIVE / Ketone: NEGATIVE  / Bili: NEGATIVE / Urobili: NORMAL   Blood: NEGATIVE / Protein: 70 / Nitrite: POSITIVE   Leuk Esterase: TRACE / RBC: 0-2 / WBC 6-10   Sq Epi: OCC / Non Sq Epi: x / Bacteria: MODERATE        MEDICATIONS  (STANDING):  cefTRIAXone   IVPB 1000 milliGRAM(s) IV Intermittent every 24 hours  dextrose 5% + sodium chloride 0.45% with potassium chloride 20 mEq/L 1000 milliLiter(s) (30 mL/Hr) IV Continuous <Continuous>  dextrose 5%. 1000 milliLiter(s) (50 mL/Hr) IV Continuous <Continuous>  dextrose 50% Injectable 12.5 Gram(s) IV Push once  dextrose 50% Injectable 25 Gram(s) IV Push once  dextrose 50% Injectable 25 Gram(s) IV Push once  enoxaparin Injectable 40 milliGRAM(s) SubCutaneous daily  insulin lispro (HumaLOG) corrective regimen sliding scale   SubCutaneous every 6 hours  levothyroxine Injectable 40 MICROGram(s) IV Push at bedtime  losartan 100 milliGRAM(s) Oral daily    MEDICATIONS  (PRN):  dextrose 40% Gel 15 Gram(s) Oral once PRN Blood Glucose LESS THAN 70 milliGRAM(s)/deciliter  glucagon  Injectable 1 milliGRAM(s) IntraMuscular once PRN Glucose LESS THAN 70 milligrams/deciliter

## 2019-06-20 NOTE — DISCHARGE NOTE PROVIDER - NSDCCPCAREPLAN_GEN_ALL_CORE_FT
PRINCIPAL DISCHARGE DIAGNOSIS  Diagnosis: Small bowel obstruction  Assessment and Plan of Treatment: You were admitted to [facility] for bowel obstruction.  When you left the hospital you were pain free and without any signs of active obstruction (passing gas consistently).  If you have any abdominal pain, fever, chills, nausea or vomiting, please call Dr. Maldonado's office.  Please call Dr. Maldonado's office to make an appointment in 1 week.

## 2019-06-20 NOTE — PROGRESS NOTE ADULT - ASSESSMENT
74F w/ HTN, DM (diet controlled), h/o appendectomy, lap nereyda, hysterectomy, incisional hernia repair (02/2019), now presents with small bowel obstruction. SBO resolving.    PLAN:  - Advance diet as tolerated  - Pain control as needed  - Lovenox for DVT prophylaxis   - f/u AM labs  - oob/ambulate  - dispo planning    B Surgery   [id number]

## 2019-06-21 LAB
-  AMIKACIN: SIGNIFICANT CHANGE UP
-  AMPICILLIN/SULBACTAM: SIGNIFICANT CHANGE UP
-  AMPICILLIN: SIGNIFICANT CHANGE UP
-  AZTREONAM: SIGNIFICANT CHANGE UP
-  CEFAZOLIN: SIGNIFICANT CHANGE UP
-  CEFEPIME: SIGNIFICANT CHANGE UP
-  CEFOXITIN: SIGNIFICANT CHANGE UP
-  CEFTAZIDIME: SIGNIFICANT CHANGE UP
-  CEFTRIAXONE: SIGNIFICANT CHANGE UP
-  CIPROFLOXACIN: SIGNIFICANT CHANGE UP
-  ERTAPENEM: SIGNIFICANT CHANGE UP
-  GENTAMICIN: SIGNIFICANT CHANGE UP
-  IMIPENEM: SIGNIFICANT CHANGE UP
-  LEVOFLOXACIN: SIGNIFICANT CHANGE UP
-  MEROPENEM: SIGNIFICANT CHANGE UP
-  NITROFURANTOIN: SIGNIFICANT CHANGE UP
-  PIPERACILLIN/TAZOBACTAM: SIGNIFICANT CHANGE UP
-  TIGECYCLINE: SIGNIFICANT CHANGE UP
-  TOBRAMYCIN: SIGNIFICANT CHANGE UP
-  TRIMETHOPRIM/SULFAMETHOXAZOLE: SIGNIFICANT CHANGE UP
BACTERIA UR CULT: SIGNIFICANT CHANGE UP
METHOD TYPE: SIGNIFICANT CHANGE UP
ORGANISM # SPEC MICROSCOPIC CNT: SIGNIFICANT CHANGE UP
ORGANISM # SPEC MICROSCOPIC CNT: SIGNIFICANT CHANGE UP

## 2019-09-13 ENCOUNTER — APPOINTMENT (OUTPATIENT)
Dept: GYNECOLOGIC ONCOLOGY | Facility: CLINIC | Age: 74
End: 2019-09-13
Payer: MEDICARE

## 2019-09-13 VITALS
HEIGHT: 62 IN | WEIGHT: 172 LBS | SYSTOLIC BLOOD PRESSURE: 168 MMHG | BODY MASS INDEX: 31.65 KG/M2 | DIASTOLIC BLOOD PRESSURE: 64 MMHG

## 2019-09-13 PROCEDURE — 99214 OFFICE O/P EST MOD 30 MIN: CPT

## 2019-09-16 LAB
CANCER AG125 SERPL-ACNC: 22 U/ML
CEA SERPL-MCNC: 4.9 NG/ML

## 2019-09-20 NOTE — HISTORY OF PRESENT ILLNESS
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 9/2015\par \par Previous Therapy\par 1) Endometrial Biopsy 8/2014\par        a) Benign endometrial polyp\par 2) Exploratory laparotomy, total abdominal hysterectomy, bilateral salpingo-oophorectomy, omentectomy, pelvic and para aortic lymphadenectomy 9/9/2015\par      a) Borderline endometrioid adenofibroma with foci of microinvasion of left adnexal mass, 12.4cm, unremarkable fallopian tube, LN 0/9\par     b) Slides reviewed by [facility] - with microscopic foci of endometrioid adenocarcinoma in background of borderline L ovarian tumor\par 3) Vaginal Biopsy 4/2016\par     a)Granulation tissue\par 4) PET/CT 2/3/17  FAMILIA\par 5) Colonoscopy normal 1/2017\par 6)CEA= 4.9 on 8/14/2017, has been persistently elevated even while FAMILIA most recent 3.7\par 7) Hospitalized at [facility] 2/2019 with SBO and incarcerated hernia\par    a) Diagnostic laparoscopy with Dr. Ag and Dr. Vines\par   b) Patient FAMILIA, with incarcerated ventral hernia with mesh placement 2/19/19\par \par Patient here for follow-up. Reports trouble sleeping the past few weeks. After she has a night where she doesn't sleep she feels like her memory gives her problems.\par \par Health Maintenance\par Mammogram 2/2016\par Colonoscopy 2017- repeat 5 years

## 2019-09-20 NOTE — PHYSICAL EXAM
[Abnormal] : Examination for hernias: Abnormal [Abdomen Hernia Ventral] : A(n) [] : incarcerated [Absent] : Adnexa(ae): Absent [Normal] : Recto-Vaginal Exam: Normal [Fully active, able to carry on all pre-disease performance without restriction] : Status 0 - Fully active, able to carry on all pre-disease performance without restriction [de-identified] : SSE and BME: intact vaginal cuff. no pelvic masses

## 2019-11-15 ENCOUNTER — OUTPATIENT (OUTPATIENT)
Dept: OUTPATIENT SERVICES | Facility: HOSPITAL | Age: 74
LOS: 1 days | End: 2019-11-15
Payer: COMMERCIAL

## 2019-11-15 VITALS
RESPIRATION RATE: 16 BRPM | TEMPERATURE: 98 F | OXYGEN SATURATION: 98 % | DIASTOLIC BLOOD PRESSURE: 86 MMHG | HEART RATE: 47 BPM | SYSTOLIC BLOOD PRESSURE: 187 MMHG | HEIGHT: 63 IN | WEIGHT: 171.96 LBS

## 2019-11-15 DIAGNOSIS — Z98.890 OTHER SPECIFIED POSTPROCEDURAL STATES: Chronic | ICD-10-CM

## 2019-11-15 DIAGNOSIS — R94.39 ABNORMAL RESULT OF OTHER CARDIOVASCULAR FUNCTION STUDY: ICD-10-CM

## 2019-11-15 DIAGNOSIS — Z90.710 ACQUIRED ABSENCE OF BOTH CERVIX AND UTERUS: Chronic | ICD-10-CM

## 2019-11-15 DIAGNOSIS — Z90.49 ACQUIRED ABSENCE OF OTHER SPECIFIED PARTS OF DIGESTIVE TRACT: Chronic | ICD-10-CM

## 2019-11-15 LAB
ALBUMIN SERPL ELPH-MCNC: 4.5 G/DL — SIGNIFICANT CHANGE UP (ref 3.3–5)
ALP SERPL-CCNC: 94 U/L — SIGNIFICANT CHANGE UP (ref 40–120)
ALT FLD-CCNC: 18 U/L — SIGNIFICANT CHANGE UP (ref 10–45)
ANION GAP SERPL CALC-SCNC: 14 MMOL/L — SIGNIFICANT CHANGE UP (ref 5–17)
AST SERPL-CCNC: 19 U/L — SIGNIFICANT CHANGE UP (ref 10–40)
BILIRUB SERPL-MCNC: 0.7 MG/DL — SIGNIFICANT CHANGE UP (ref 0.2–1.2)
BUN SERPL-MCNC: 20 MG/DL — SIGNIFICANT CHANGE UP (ref 7–23)
CALCIUM SERPL-MCNC: 9.8 MG/DL — SIGNIFICANT CHANGE UP (ref 8.4–10.5)
CHLORIDE SERPL-SCNC: 104 MMOL/L — SIGNIFICANT CHANGE UP (ref 96–108)
CO2 SERPL-SCNC: 23 MMOL/L — SIGNIFICANT CHANGE UP (ref 22–31)
CREAT SERPL-MCNC: 0.86 MG/DL — SIGNIFICANT CHANGE UP (ref 0.5–1.3)
GLUCOSE SERPL-MCNC: 108 MG/DL — HIGH (ref 70–99)
HCT VFR BLD CALC: 34.5 % — SIGNIFICANT CHANGE UP (ref 34.5–45)
HGB BLD-MCNC: 12.1 G/DL — SIGNIFICANT CHANGE UP (ref 11.5–15.5)
MCHC RBC-ENTMCNC: 32.5 PG — SIGNIFICANT CHANGE UP (ref 27–34)
MCHC RBC-ENTMCNC: 35.1 GM/DL — SIGNIFICANT CHANGE UP (ref 32–36)
MCV RBC AUTO: 92.7 FL — SIGNIFICANT CHANGE UP (ref 80–100)
NRBC # BLD: 0 /100 WBCS — SIGNIFICANT CHANGE UP (ref 0–0)
PLATELET # BLD AUTO: 166 K/UL — SIGNIFICANT CHANGE UP (ref 150–400)
POTASSIUM SERPL-MCNC: 3.9 MMOL/L — SIGNIFICANT CHANGE UP (ref 3.5–5.3)
POTASSIUM SERPL-SCNC: 3.9 MMOL/L — SIGNIFICANT CHANGE UP (ref 3.5–5.3)
PROT SERPL-MCNC: 7 G/DL — SIGNIFICANT CHANGE UP (ref 6–8.3)
RBC # BLD: 3.72 M/UL — LOW (ref 3.8–5.2)
RBC # FLD: 12.6 % — SIGNIFICANT CHANGE UP (ref 10.3–14.5)
SODIUM SERPL-SCNC: 141 MMOL/L — SIGNIFICANT CHANGE UP (ref 135–145)
WBC # BLD: 4.52 K/UL — SIGNIFICANT CHANGE UP (ref 3.8–10.5)
WBC # FLD AUTO: 4.52 K/UL — SIGNIFICANT CHANGE UP (ref 3.8–10.5)

## 2019-11-15 PROCEDURE — 93010 ELECTROCARDIOGRAM REPORT: CPT

## 2019-11-15 PROCEDURE — C1887: CPT

## 2019-11-15 PROCEDURE — 85027 COMPLETE CBC AUTOMATED: CPT

## 2019-11-15 PROCEDURE — 82962 GLUCOSE BLOOD TEST: CPT

## 2019-11-15 PROCEDURE — 99152 MOD SED SAME PHYS/QHP 5/>YRS: CPT

## 2019-11-15 PROCEDURE — C1769: CPT

## 2019-11-15 PROCEDURE — 93005 ELECTROCARDIOGRAM TRACING: CPT

## 2019-11-15 PROCEDURE — 93458 L HRT ARTERY/VENTRICLE ANGIO: CPT

## 2019-11-15 PROCEDURE — C1894: CPT

## 2019-11-15 PROCEDURE — 80053 COMPREHEN METABOLIC PANEL: CPT

## 2019-11-15 RX ORDER — SODIUM CHLORIDE 9 MG/ML
3 INJECTION INTRAMUSCULAR; INTRAVENOUS; SUBCUTANEOUS EVERY 8 HOURS
Refills: 0 | Status: DISCONTINUED | OUTPATIENT
Start: 2019-11-15 | End: 2019-11-30

## 2019-11-15 NOTE — H&P CARDIOLOGY - PMH
Essential hypertension  HTN (hypertension)  Hypothyroidism    Obesity    Type 2 diabetes mellitus  diet controlled

## 2019-11-15 NOTE — H&P CARDIOLOGY - FAMILY HISTORY
No pertinent family history in first degree relatives Mother  Still living? No  Family history of uterine cancer, Age at diagnosis: Age Unknown

## 2019-11-15 NOTE — H&P CARDIOLOGY - HISTORY OF PRESENT ILLNESS
74 year old  female xxxx implantable devices h/o HTN, hypothyroid, obesity, T2DM ( A1c:    , managed by ,     diet controlled) who c/o dizziness, occ palpitations and CLEMONS. Pt underwent stress test (see below) that was positive and is now referred for left heart cath.     Symptoms:        Angina (Class):        Ischemic Symptoms:     Heart Failure:        Systolic/Diastolic/Combined:        NYHA Class (within 2 weeks):     Assessment of LVEF:       EF: 60%       Assessed by: TTE       Date: 10/2/19    Prior Cardiac Interventions: none       PCI's:        CABG:     Noninvasive Testing:   Stress Test: Date: 11/5/19       Protocol:        Duration of Exercise:        Symptoms: none       EKG Changes: none       DTS:        Myocardial Imaging: myoview inferior defect       Risk Assessment:     Echo: 10/2/19  EF 60%, mild MR, mild AR, calcified aortic valve    Antianginal Therapies:        Beta Blockers:  no       Calcium Channel Blockers: no       Long Acting Nitrates: no       Ranexa: no    Associated Risk Factors:        Cerebrovascular Disease: N/A       Chronic Lung Disease: N/A       Peripheral Arterial Disease: N/A       Chronic Kidney Disease (if yes, what is GFR): N/A       Uncontrolled Diabetes (if yes, what is HgbA1C or FBS): N/A       Poorly Controlled Hypertension (if yes, what is SBP): N/A       Morbid Obesity (if yes, what is BMI): N/A       History of Recent Ventricular Arrhythmia: N/A       Inability to Ambulate Safely: N/A       Need for Therapeutic Anticoagulation: N/A       Antiplatelet or Contrast Allergy: N/A 74 year old  female no implantable devices h/o HTN, hypothyroid, obesity, T2DM ( A1c:unknown , managed by PMD Dr Holloway , diet controlled) who c/o dizziness, occ palpitations and CLEMONS. Pt underwent stress test (see below) that was positive and is now referred for left heart cath.     Symptoms:        Angina (Class): 2       Ischemic Symptoms: sob    Heart Failure: none       Systolic/Diastolic/Combined:        NYHA Class (within 2 weeks):     Assessment of LVEF:       EF: 60%       Assessed by: TTE       Date: 10/2/19    Prior Cardiac Interventions: none       PCI's:        CABG:     Noninvasive Testing:   Stress Test: Date: 11/5/19       Protocol:        Duration of Exercise:        Symptoms: none       EKG Changes: none       DTS:        Myocardial Imaging: myoview inferior defect       Risk Assessment:     Echo: 10/2/19  EF 60%, mild MR, mild AR, calcified aortic valve    Antianginal Therapies:        Beta Blockers:  no       Calcium Channel Blockers: no       Long Acting Nitrates: no       Ranexa: no    Associated Risk Factors:        Cerebrovascular Disease: N/A       Chronic Lung Disease: N/A       Peripheral Arterial Disease: N/A       Chronic Kidney Disease (if yes, what is GFR): N/A       Uncontrolled Diabetes (if yes, what is HgbA1C or FBS): N/A       Poorly Controlled Hypertension (if yes, what is SBP): N/A       Morbid Obesity (if yes, what is BMI): N/A       History of Recent Ventricular Arrhythmia: N/A       Inability to Ambulate Safely: N/A       Need for Therapeutic Anticoagulation: N/A       Antiplatelet or Contrast Allergy: N/A

## 2020-02-24 ENCOUNTER — EMERGENCY (EMERGENCY)
Facility: HOSPITAL | Age: 75
LOS: 1 days | Discharge: HOME | End: 2020-02-24
Attending: STUDENT IN AN ORGANIZED HEALTH CARE EDUCATION/TRAINING PROGRAM
Payer: MEDICARE

## 2020-02-24 VITALS
DIASTOLIC BLOOD PRESSURE: 58 MMHG | RESPIRATION RATE: 18 BRPM | TEMPERATURE: 98 F | OXYGEN SATURATION: 100 % | HEART RATE: 88 BPM | SYSTOLIC BLOOD PRESSURE: 143 MMHG

## 2020-02-24 VITALS
SYSTOLIC BLOOD PRESSURE: 152 MMHG | TEMPERATURE: 99 F | RESPIRATION RATE: 18 BRPM | DIASTOLIC BLOOD PRESSURE: 70 MMHG | OXYGEN SATURATION: 98 % | HEART RATE: 60 BPM

## 2020-02-24 DIAGNOSIS — Z90.49 ACQUIRED ABSENCE OF OTHER SPECIFIED PARTS OF DIGESTIVE TRACT: Chronic | ICD-10-CM

## 2020-02-24 DIAGNOSIS — Z98.890 OTHER SPECIFIED POSTPROCEDURAL STATES: Chronic | ICD-10-CM

## 2020-02-24 DIAGNOSIS — Z90.710 ACQUIRED ABSENCE OF BOTH CERVIX AND UTERUS: Chronic | ICD-10-CM

## 2020-02-24 LAB
ALBUMIN SERPL ELPH-MCNC: 4.5 G/DL — SIGNIFICANT CHANGE UP (ref 3.3–5)
ALP SERPL-CCNC: 110 U/L — SIGNIFICANT CHANGE UP (ref 40–120)
ALT FLD-CCNC: 19 U/L — SIGNIFICANT CHANGE UP (ref 4–33)
ANION GAP SERPL CALC-SCNC: 13 MMO/L — SIGNIFICANT CHANGE UP (ref 7–14)
AST SERPL-CCNC: 20 U/L — SIGNIFICANT CHANGE UP (ref 4–32)
BASOPHILS # BLD AUTO: 0.03 K/UL — SIGNIFICANT CHANGE UP (ref 0–0.2)
BASOPHILS NFR BLD AUTO: 0.6 % — SIGNIFICANT CHANGE UP (ref 0–2)
BILIRUB SERPL-MCNC: 0.8 MG/DL — SIGNIFICANT CHANGE UP (ref 0.2–1.2)
BUN SERPL-MCNC: 26 MG/DL — HIGH (ref 7–23)
CALCIUM SERPL-MCNC: 10.1 MG/DL — SIGNIFICANT CHANGE UP (ref 8.4–10.5)
CHLORIDE SERPL-SCNC: 104 MMOL/L — SIGNIFICANT CHANGE UP (ref 98–107)
CO2 SERPL-SCNC: 21 MMOL/L — LOW (ref 22–31)
CREAT SERPL-MCNC: 0.84 MG/DL — SIGNIFICANT CHANGE UP (ref 0.5–1.3)
EOSINOPHIL # BLD AUTO: 0.14 K/UL — SIGNIFICANT CHANGE UP (ref 0–0.5)
EOSINOPHIL NFR BLD AUTO: 2.7 % — SIGNIFICANT CHANGE UP (ref 0–6)
GLUCOSE SERPL-MCNC: 111 MG/DL — HIGH (ref 70–99)
HCT VFR BLD CALC: 36.6 % — SIGNIFICANT CHANGE UP (ref 34.5–45)
HGB BLD-MCNC: 11.9 G/DL — SIGNIFICANT CHANGE UP (ref 11.5–15.5)
IMM GRANULOCYTES NFR BLD AUTO: 0.4 % — SIGNIFICANT CHANGE UP (ref 0–1.5)
LYMPHOCYTES # BLD AUTO: 1.21 K/UL — SIGNIFICANT CHANGE UP (ref 1–3.3)
LYMPHOCYTES # BLD AUTO: 22.9 % — SIGNIFICANT CHANGE UP (ref 13–44)
MCHC RBC-ENTMCNC: 31.2 PG — SIGNIFICANT CHANGE UP (ref 27–34)
MCHC RBC-ENTMCNC: 32.5 % — SIGNIFICANT CHANGE UP (ref 32–36)
MCV RBC AUTO: 96.1 FL — SIGNIFICANT CHANGE UP (ref 80–100)
MONOCYTES # BLD AUTO: 0.4 K/UL — SIGNIFICANT CHANGE UP (ref 0–0.9)
MONOCYTES NFR BLD AUTO: 7.6 % — SIGNIFICANT CHANGE UP (ref 2–14)
NEUTROPHILS # BLD AUTO: 3.48 K/UL — SIGNIFICANT CHANGE UP (ref 1.8–7.4)
NEUTROPHILS NFR BLD AUTO: 65.8 % — SIGNIFICANT CHANGE UP (ref 43–77)
NRBC # FLD: 0 K/UL — SIGNIFICANT CHANGE UP (ref 0–0)
PLATELET # BLD AUTO: 205 K/UL — SIGNIFICANT CHANGE UP (ref 150–400)
PMV BLD: 10.5 FL — SIGNIFICANT CHANGE UP (ref 7–13)
POTASSIUM SERPL-MCNC: 4.4 MMOL/L — SIGNIFICANT CHANGE UP (ref 3.5–5.3)
POTASSIUM SERPL-SCNC: 4.4 MMOL/L — SIGNIFICANT CHANGE UP (ref 3.5–5.3)
PROT SERPL-MCNC: 7.4 G/DL — SIGNIFICANT CHANGE UP (ref 6–8.3)
RBC # BLD: 3.81 M/UL — SIGNIFICANT CHANGE UP (ref 3.8–5.2)
RBC # FLD: 12.3 % — SIGNIFICANT CHANGE UP (ref 10.3–14.5)
SODIUM SERPL-SCNC: 138 MMOL/L — SIGNIFICANT CHANGE UP (ref 135–145)
WBC # BLD: 5.28 K/UL — SIGNIFICANT CHANGE UP (ref 3.8–10.5)
WBC # FLD AUTO: 5.28 K/UL — SIGNIFICANT CHANGE UP (ref 3.8–10.5)

## 2020-02-24 PROCEDURE — 73610 X-RAY EXAM OF ANKLE: CPT | Mod: 26,LT

## 2020-02-24 PROCEDURE — 70486 CT MAXILLOFACIAL W/O DYE: CPT | Mod: 26

## 2020-02-24 PROCEDURE — 73552 X-RAY EXAM OF FEMUR 2/>: CPT | Mod: 26,LT

## 2020-02-24 PROCEDURE — 71046 X-RAY EXAM CHEST 2 VIEWS: CPT | Mod: 26

## 2020-02-24 PROCEDURE — 72125 CT NECK SPINE W/O DYE: CPT | Mod: 26

## 2020-02-24 PROCEDURE — 70450 CT HEAD/BRAIN W/O DYE: CPT | Mod: 26

## 2020-02-24 PROCEDURE — 73590 X-RAY EXAM OF LOWER LEG: CPT | Mod: 26,LT

## 2020-02-24 PROCEDURE — 99284 EMERGENCY DEPT VISIT MOD MDM: CPT

## 2020-02-24 PROCEDURE — 73502 X-RAY EXAM HIP UNI 2-3 VIEWS: CPT | Mod: 26,LT

## 2020-02-24 PROCEDURE — 73564 X-RAY EXAM KNEE 4 OR MORE: CPT | Mod: 26,LT

## 2020-02-24 RX ORDER — ACETAMINOPHEN 500 MG
975 TABLET ORAL ONCE
Refills: 0 | Status: COMPLETED | OUTPATIENT
Start: 2020-02-24 | End: 2020-02-24

## 2020-02-24 RX ADMIN — Medication 975 MILLIGRAM(S): at 14:53

## 2020-02-24 NOTE — ED PROVIDER NOTE - OBJECTIVE STATEMENT
74 y/o F w/ PMH hypothyroidism and HTN presents to the ED 1 day after mechanical fall at home. Pt states she tripped 5 stairs from the bottom, landing on her face and L knee. Pt c/o L facial pain as well as L knee pain and L hip pain. Denies any vision changes, numbness, chest pain, back pain, or abdominal chris. Pt states she took 1 Advil pill earlier today but has not taken anything else since. Denies any LOC, neck pain, or any prodromal symptoms prior to falling. Pt reports history of syncope with falls and has workup with outpatient doctor. Pt has seen cardiology and neurology and reports yesterday was totally awake for fall. 74 y/o F w/ PMH hypothyroidism and HTN presents to the ED 1 day after mechanical fall at home. Pt states she tripped 5 stairs from the bottom, landing on her face and L knee. Pt c/o L facial pain as well as L knee pain and L hip pain. Denies any vision changes, numbness, chest pain, back pain, or abdominal chris. Pt states she took 1 Advil pill earlier today but has not taken anything else since. Denies any LOC, neck pain, or any prodromal symptoms prior to falling. Pt reports history of syncope with falls and has workup with outpatient doctor. Pt has been evaluated by cardiology and neurology out-patient and denies syncope associated with this fall.

## 2020-02-24 NOTE — ED ADULT TRIAGE NOTE - CHIEF COMPLAINT QUOTE
Pt slipped and fell down 5 steps yesterday and c/o left lower back, left hip and left leg pain. Pt denies LOC or blood thinners is ambulatory with cane and has bruise noted below the left eye. PMH hypthyroid, HTN

## 2020-02-24 NOTE — ED PROVIDER NOTE - PATIENT PORTAL LINK FT
You can access the FollowMyHealth Patient Portal offered by [facility] by registering at the following website: [email or web address]. By joining [facility]’s FollowMyHealth portal, you will also be able to view your health information using other applications (apps) compatible with our system.

## 2020-02-24 NOTE — ED PROVIDER NOTE - EXTREMITY EXAM
right upper extremity findings/left lower extremity findings/left upper extremity findings/right lower extremity findings

## 2020-02-24 NOTE — ED PROVIDER NOTE - CROS ED [facility] ALL NEG
Called back and LM on  to call us. A letter was left on my desk yesterday for pt. Need to clarify with her where to send it. - - -

## 2020-02-24 NOTE — ED PROVIDER NOTE - LOWER EXTREMITY EXAM, LEFT
Lateral ecchymosis at L hip. Pain and decreased range of motion of the L knee with swelling. Compartments soft. 2+ peripheral pulses. Lateral ecchymosis at L hip. Pain and decreased range of motion of the L knee with swelling. Compartments soft. 2+ peripheral pulses. compartments soft

## 2020-02-24 NOTE — ED ADULT NURSE NOTE - NSSUHOSCREENINGYN_ED_ALL_ED
rTMS visit 5.  No adverse events.  AMT=58.  Treatment intensity=52.  6Hz priming then 1 hz principal to contralesional M1.  Followed by box and block retest.  Left hand improved from 15 at baseline to 21 today.  Right hand remained stable with 54 at baseline and 56 today.  Practiced his hobby of using stamp collection adjustment using tweezers with left hand.  He was able to do this slowly.  Good exercise. Practiced with Smart Glove and did well.  
Yes - the patient is able to be screened

## 2020-02-24 NOTE — ED PROVIDER NOTE - CLINICAL SUMMARY MEDICAL DECISION MAKING FREE TEXT BOX
76 y/o F s/p mechanical fall. Will assess for facial fracture and L lower extremity fracture. Based on history, does not sound like she dislocated her knee. Plan for symptomatic relief, labs, x-ray, CT and reassess.

## 2020-02-24 NOTE — ED PROVIDER NOTE - PROGRESS NOTE DETAILS
Eloy: Imaging, and labs wnl, patient in no acute distress, ambulating with stable gait and without difficulty, plan for d/c home with strict return precautions. Patient expressed understanding and requesing discharge.

## 2020-02-24 NOTE — ED ADULT NURSE NOTE - OBJECTIVE STATEMENT
Pt endorses s/p fall today.  Denies any dizziness prior to fall.  Pt endorses pain to left knee initially.  Denies pain at this time.   IV access obtained.  Labs drawn and sent.  Pt medicated as per EMAR.

## 2020-03-11 ENCOUNTER — APPOINTMENT (OUTPATIENT)
Dept: GYNECOLOGIC ONCOLOGY | Facility: CLINIC | Age: 75
End: 2020-03-11

## 2020-03-23 ENCOUNTER — APPOINTMENT (OUTPATIENT)
Dept: GYNECOLOGIC ONCOLOGY | Facility: CLINIC | Age: 75
End: 2020-03-23

## 2020-06-11 ENCOUNTER — APPOINTMENT (OUTPATIENT)
Dept: GYNECOLOGIC ONCOLOGY | Facility: CLINIC | Age: 75
End: 2020-06-11
Payer: MEDICARE

## 2020-06-11 DIAGNOSIS — E66.9 OBESITY, UNSPECIFIED: ICD-10-CM

## 2020-06-11 DIAGNOSIS — C56.9 MALIGNANT NEOPLASM OF UNSPECIFIED OVARY: ICD-10-CM

## 2020-06-11 PROCEDURE — G2010: CPT

## 2020-08-21 ENCOUNTER — OUTPATIENT (OUTPATIENT)
Dept: OUTPATIENT SERVICES | Facility: HOSPITAL | Age: 75
LOS: 1 days | Discharge: STILL ADMITTED OR ADMINISTRATIVE | End: 2020-08-21

## 2020-08-21 DIAGNOSIS — Z98.890 OTHER SPECIFIED POSTPROCEDURAL STATES: Chronic | ICD-10-CM

## 2020-08-21 DIAGNOSIS — Z90.710 ACQUIRED ABSENCE OF BOTH CERVIX AND UTERUS: Chronic | ICD-10-CM

## 2020-08-21 DIAGNOSIS — Z90.49 ACQUIRED ABSENCE OF OTHER SPECIFIED PARTS OF DIGESTIVE TRACT: Chronic | ICD-10-CM

## 2020-08-24 DIAGNOSIS — F43.23 ADJUSTMENT DISORDER WITH MIXED ANXIETY AND DEPRESSED MOOD: ICD-10-CM

## 2020-09-16 ENCOUNTER — APPOINTMENT (OUTPATIENT)
Dept: GYNECOLOGIC ONCOLOGY | Facility: CLINIC | Age: 75
End: 2020-09-16
Payer: MEDICARE

## 2020-09-21 ENCOUNTER — OUTPATIENT (OUTPATIENT)
Dept: OUTPATIENT SERVICES | Facility: HOSPITAL | Age: 75
LOS: 1 days | Discharge: HOME | End: 2020-09-21
Payer: MEDICARE

## 2020-09-21 DIAGNOSIS — F03.90 UNSPECIFIED DEMENTIA WITHOUT BEHAVIORAL DISTURBANCE: ICD-10-CM

## 2020-09-21 DIAGNOSIS — F43.23 ADJUSTMENT DISORDER WITH MIXED ANXIETY AND DEPRESSED MOOD: ICD-10-CM

## 2020-09-21 DIAGNOSIS — Z90.710 ACQUIRED ABSENCE OF BOTH CERVIX AND UTERUS: Chronic | ICD-10-CM

## 2020-09-21 DIAGNOSIS — Z98.890 OTHER SPECIFIED POSTPROCEDURAL STATES: Chronic | ICD-10-CM

## 2020-09-21 DIAGNOSIS — Z90.49 ACQUIRED ABSENCE OF OTHER SPECIFIED PARTS OF DIGESTIVE TRACT: Chronic | ICD-10-CM

## 2020-09-22 DIAGNOSIS — F02.81 DEMENTIA IN OTHER DISEASES CLASSIFIED ELSEWHERE, UNSPECIFIED SEVERITY, WITH BEHAVIORAL DISTURBANCE: ICD-10-CM

## 2020-09-22 DIAGNOSIS — G31.84 MILD COGNITIVE IMPAIRMENT OF UNCERTAIN OR UNKNOWN ETIOLOGY: ICD-10-CM

## 2020-09-23 ENCOUNTER — APPOINTMENT (OUTPATIENT)
Dept: GYNECOLOGIC ONCOLOGY | Facility: CLINIC | Age: 75
End: 2020-09-23
Payer: MEDICARE

## 2020-09-23 VITALS
HEART RATE: 64 BPM | HEIGHT: 62 IN | BODY MASS INDEX: 33.31 KG/M2 | OXYGEN SATURATION: 97 % | DIASTOLIC BLOOD PRESSURE: 78 MMHG | WEIGHT: 181 LBS | SYSTOLIC BLOOD PRESSURE: 132 MMHG

## 2020-09-23 DIAGNOSIS — Z12.31 ENCOUNTER FOR SCREENING MAMMOGRAM FOR MALIGNANT NEOPLASM OF BREAST: ICD-10-CM

## 2020-09-23 DIAGNOSIS — K59.00 CONSTIPATION, UNSPECIFIED: ICD-10-CM

## 2020-09-23 PROCEDURE — 36415 COLL VENOUS BLD VENIPUNCTURE: CPT

## 2020-09-23 PROCEDURE — 99214 OFFICE O/P EST MOD 30 MIN: CPT

## 2020-09-23 NOTE — PHYSICAL EXAM
[Normal] : No focal neurologic defects observed [de-identified] : lower extremity edema noted [de-identified] : started crying when I asked how she was doing, admits to depression  [de-identified] : SSE and BME: intact vaginal cuff. no pelvic masses

## 2020-09-23 NOTE — REVIEW OF SYSTEMS
[Depression] : depression [FreeTextEntry3] : agrees to see SW [de-identified] : +CONSTIPATION [de-identified] : ambulates with cane

## 2020-09-23 NOTE — ASSESSMENT
[FreeTextEntry1] : CEA\par CA-125\par Mammogram \par Will order milk of magnesium for constipation. Pt advised if constipation continues to call us for re-evaluation\par Lower extremity edema noted BL, no pain - Pt advised to f/u with PCP\par Will refer to SW for depression and further evaluation \par 5 years FAMILIA! Annual exams now with Rosy HENNESSY in [place]

## 2020-09-23 NOTE — HISTORY OF PRESENT ILLNESS
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 9/2015\par \par Previous Therapy\par 1) Endometrial Biopsy 8/2014\par        a) Benign endometrial polyp\par 2) Exploratory laparotomy, total abdominal hysterectomy, bilateral salpingo-oophorectomy, omentectomy, pelvic and para aortic lymphadenectomy 9/9/2015\par      a) Borderline endometrioid adenofibroma with foci of microinvasion of left adnexal mass, 12.4cm, unremarkable fallopian tube, LN 0/9\par     b) [place] [facility] [place] with microscopic foci of endometrioid adenocarcinoma in background of borderline L ovarian tumor\par 3) Vaginal Biopsy 4/2016\par     a)Granulation tissue\par 4) PET/CT 2/3/17  FAMILIA\par 5) Colonoscopy normal 1/2017\par 6)CEA= 4.9 on 8/14/2017, has been persistently elevated even while FAMILIA most recent 3.7\par 7) Hospitalized at [facility] 2/2019 with SBO and incarcerated hernia\par    a) Diagnostic laparoscopy with Dr. Ag and Dr. Vines\par   b) Patient FAMILIA, with incarcerated ventral hernia with mesh placement 2/19/19\par 8) CEA= 4.9 on 9/16/2019 \par \par Patient here for follow-up. Pt reports she is feeling well but has noticed increased constipation in the past 2 weeks. Pt denies abdominal pain, abdominal bloating or vaginal bleeding. She also reports depression-- and agrees to be seen by \par \par Health Maintenance\par Mammogram 2/2016- Due - will give referral today \par Colonoscopy 2017- repeat 5 years

## 2020-09-29 ENCOUNTER — INPATIENT (INPATIENT)
Facility: HOSPITAL | Age: 75
LOS: 4 days | Discharge: HOME | End: 2020-10-04
Attending: INTERNAL MEDICINE | Admitting: INTERNAL MEDICINE
Payer: MEDICARE

## 2020-09-29 VITALS
SYSTOLIC BLOOD PRESSURE: 131 MMHG | HEART RATE: 62 BPM | RESPIRATION RATE: 16 BRPM | HEIGHT: 63 IN | DIASTOLIC BLOOD PRESSURE: 62 MMHG | TEMPERATURE: 97 F | OXYGEN SATURATION: 99 %

## 2020-09-29 DIAGNOSIS — Z90.49 ACQUIRED ABSENCE OF OTHER SPECIFIED PARTS OF DIGESTIVE TRACT: Chronic | ICD-10-CM

## 2020-09-29 DIAGNOSIS — Z98.890 OTHER SPECIFIED POSTPROCEDURAL STATES: Chronic | ICD-10-CM

## 2020-09-29 DIAGNOSIS — Z90.710 ACQUIRED ABSENCE OF BOTH CERVIX AND UTERUS: Chronic | ICD-10-CM

## 2020-09-29 DIAGNOSIS — R55 SYNCOPE AND COLLAPSE: ICD-10-CM

## 2020-09-29 LAB
ALBUMIN SERPL ELPH-MCNC: 4.4 G/DL — SIGNIFICANT CHANGE UP (ref 3.3–5)
ALP SERPL-CCNC: 104 U/L — SIGNIFICANT CHANGE UP (ref 40–120)
ALT FLD-CCNC: 20 U/L — SIGNIFICANT CHANGE UP (ref 4–33)
ANION GAP SERPL CALC-SCNC: 11 MMO/L — SIGNIFICANT CHANGE UP (ref 7–14)
AST SERPL-CCNC: 20 U/L — SIGNIFICANT CHANGE UP (ref 4–32)
BASOPHILS # BLD AUTO: 0.04 K/UL — SIGNIFICANT CHANGE UP (ref 0–0.2)
BASOPHILS NFR BLD AUTO: 0.7 % — SIGNIFICANT CHANGE UP (ref 0–2)
BILIRUB SERPL-MCNC: 0.3 MG/DL — SIGNIFICANT CHANGE UP (ref 0.2–1.2)
BUN SERPL-MCNC: 30 MG/DL — HIGH (ref 7–23)
CALCIUM SERPL-MCNC: 10.1 MG/DL — SIGNIFICANT CHANGE UP (ref 8.4–10.5)
CANCER AG125 SERPL-ACNC: 25 U/ML
CHLORIDE SERPL-SCNC: 106 MMOL/L — SIGNIFICANT CHANGE UP (ref 98–107)
CO2 SERPL-SCNC: 23 MMOL/L — SIGNIFICANT CHANGE UP (ref 22–31)
CREAT SERPL-MCNC: 0.99 MG/DL — SIGNIFICANT CHANGE UP (ref 0.5–1.3)
EOSINOPHIL # BLD AUTO: 0.09 K/UL — SIGNIFICANT CHANGE UP (ref 0–0.5)
EOSINOPHIL NFR BLD AUTO: 1.6 % — SIGNIFICANT CHANGE UP (ref 0–6)
GLUCOSE BLDC GLUCOMTR-MCNC: 219 MG/DL — HIGH (ref 70–99)
GLUCOSE SERPL-MCNC: 164 MG/DL — HIGH (ref 70–99)
HCT VFR BLD CALC: 34 % — LOW (ref 34.5–45)
HGB BLD-MCNC: 11.6 G/DL — SIGNIFICANT CHANGE UP (ref 11.5–15.5)
IMM GRANULOCYTES NFR BLD AUTO: 0.3 % — SIGNIFICANT CHANGE UP (ref 0–1.5)
LYMPHOCYTES # BLD AUTO: 1.17 K/UL — SIGNIFICANT CHANGE UP (ref 1–3.3)
LYMPHOCYTES # BLD AUTO: 20.3 % — SIGNIFICANT CHANGE UP (ref 13–44)
MCHC RBC-ENTMCNC: 31.1 PG — SIGNIFICANT CHANGE UP (ref 27–34)
MCHC RBC-ENTMCNC: 34.1 % — SIGNIFICANT CHANGE UP (ref 32–36)
MCV RBC AUTO: 91.2 FL — SIGNIFICANT CHANGE UP (ref 80–100)
MONOCYTES # BLD AUTO: 0.44 K/UL — SIGNIFICANT CHANGE UP (ref 0–0.9)
MONOCYTES NFR BLD AUTO: 7.6 % — SIGNIFICANT CHANGE UP (ref 2–14)
NEUTROPHILS # BLD AUTO: 4.01 K/UL — SIGNIFICANT CHANGE UP (ref 1.8–7.4)
NEUTROPHILS NFR BLD AUTO: 69.5 % — SIGNIFICANT CHANGE UP (ref 43–77)
NRBC # FLD: 0 K/UL — SIGNIFICANT CHANGE UP (ref 0–0)
PLATELET # BLD AUTO: 204 K/UL — SIGNIFICANT CHANGE UP (ref 150–400)
PMV BLD: 9.7 FL — SIGNIFICANT CHANGE UP (ref 7–13)
POTASSIUM SERPL-MCNC: 4.8 MMOL/L — SIGNIFICANT CHANGE UP (ref 3.5–5.3)
POTASSIUM SERPL-SCNC: 4.8 MMOL/L — SIGNIFICANT CHANGE UP (ref 3.5–5.3)
PROT SERPL-MCNC: 6.8 G/DL — SIGNIFICANT CHANGE UP (ref 6–8.3)
RBC # BLD: 3.73 M/UL — LOW (ref 3.8–5.2)
RBC # FLD: 12.5 % — SIGNIFICANT CHANGE UP (ref 10.3–14.5)
SODIUM SERPL-SCNC: 140 MMOL/L — SIGNIFICANT CHANGE UP (ref 135–145)
TROPONIN T, HIGH SENSITIVITY: 11 NG/L — SIGNIFICANT CHANGE UP (ref ?–14)
TROPONIN T, HIGH SENSITIVITY: 11 NG/L — SIGNIFICANT CHANGE UP (ref ?–14)
WBC # BLD: 5.77 K/UL — SIGNIFICANT CHANGE UP (ref 3.8–10.5)
WBC # FLD AUTO: 5.77 K/UL — SIGNIFICANT CHANGE UP (ref 3.8–10.5)

## 2020-09-29 PROCEDURE — 99285 EMERGENCY DEPT VISIT HI MDM: CPT | Mod: 25

## 2020-09-29 PROCEDURE — 12002 RPR S/N/AX/GEN/TRNK2.6-7.5CM: CPT

## 2020-09-29 PROCEDURE — 70450 CT HEAD/BRAIN W/O DYE: CPT | Mod: 26

## 2020-09-29 PROCEDURE — 99223 1ST HOSP IP/OBS HIGH 75: CPT

## 2020-09-29 PROCEDURE — 73130 X-RAY EXAM OF HAND: CPT | Mod: 26,LT

## 2020-09-29 RX ORDER — GLUCAGON INJECTION, SOLUTION 0.5 MG/.1ML
1 INJECTION, SOLUTION SUBCUTANEOUS ONCE
Refills: 0 | Status: DISCONTINUED | OUTPATIENT
Start: 2020-09-29 | End: 2020-10-04

## 2020-09-29 RX ORDER — SODIUM CHLORIDE 9 MG/ML
1000 INJECTION, SOLUTION INTRAVENOUS
Refills: 0 | Status: DISCONTINUED | OUTPATIENT
Start: 2020-09-29 | End: 2020-10-04

## 2020-09-29 RX ORDER — DEXTROSE 50 % IN WATER 50 %
25 SYRINGE (ML) INTRAVENOUS ONCE
Refills: 0 | Status: DISCONTINUED | OUTPATIENT
Start: 2020-09-29 | End: 2020-10-04

## 2020-09-29 RX ORDER — DEXTROSE 50 % IN WATER 50 %
12.5 SYRINGE (ML) INTRAVENOUS ONCE
Refills: 0 | Status: DISCONTINUED | OUTPATIENT
Start: 2020-09-29 | End: 2020-10-04

## 2020-09-29 RX ORDER — INSULIN LISPRO 100/ML
VIAL (ML) SUBCUTANEOUS AT BEDTIME
Refills: 0 | Status: DISCONTINUED | OUTPATIENT
Start: 2020-09-29 | End: 2020-10-04

## 2020-09-29 RX ORDER — INSULIN LISPRO 100/ML
VIAL (ML) SUBCUTANEOUS
Refills: 0 | Status: DISCONTINUED | OUTPATIENT
Start: 2020-09-29 | End: 2020-10-04

## 2020-09-29 RX ORDER — SENNA PLUS 8.6 MG/1
2 TABLET ORAL AT BEDTIME
Refills: 0 | Status: DISCONTINUED | OUTPATIENT
Start: 2020-09-29 | End: 2020-10-04

## 2020-09-29 RX ORDER — TETANUS TOXOID, REDUCED DIPHTHERIA TOXOID AND ACELLULAR PERTUSSIS VACCINE, ADSORBED 5; 2.5; 8; 8; 2.5 [IU]/.5ML; [IU]/.5ML; UG/.5ML; UG/.5ML; UG/.5ML
0.5 SUSPENSION INTRAMUSCULAR ONCE
Refills: 0 | Status: COMPLETED | OUTPATIENT
Start: 2020-09-29 | End: 2020-09-29

## 2020-09-29 RX ORDER — ACETAMINOPHEN 500 MG
975 TABLET ORAL ONCE
Refills: 0 | Status: COMPLETED | OUTPATIENT
Start: 2020-09-29 | End: 2020-09-29

## 2020-09-29 RX ORDER — ENOXAPARIN SODIUM 100 MG/ML
40 INJECTION SUBCUTANEOUS DAILY
Refills: 0 | Status: DISCONTINUED | OUTPATIENT
Start: 2020-09-29 | End: 2020-10-04

## 2020-09-29 RX ORDER — DEXTROSE 50 % IN WATER 50 %
15 SYRINGE (ML) INTRAVENOUS ONCE
Refills: 0 | Status: DISCONTINUED | OUTPATIENT
Start: 2020-09-29 | End: 2020-10-04

## 2020-09-29 RX ORDER — ACETAMINOPHEN 500 MG
650 TABLET ORAL EVERY 6 HOURS
Refills: 0 | Status: DISCONTINUED | OUTPATIENT
Start: 2020-09-29 | End: 2020-10-04

## 2020-09-29 RX ADMIN — Medication 2: at 23:04

## 2020-09-29 RX ADMIN — Medication 975 MILLIGRAM(S): at 15:12

## 2020-09-29 RX ADMIN — TETANUS TOXOID, REDUCED DIPHTHERIA TOXOID AND ACELLULAR PERTUSSIS VACCINE, ADSORBED 0.5 MILLILITER(S): 5; 2.5; 8; 8; 2.5 SUSPENSION INTRAMUSCULAR at 15:12

## 2020-09-29 NOTE — ED ADULT NURSE NOTE - OBJECTIVE STATEMENT
Pt. A&Ox3, brought to rm 24, states she was walking outside her house when she became dizzy falling forward bracing herself with her left hand and hitting the right temporal. Laceration noted to left lateral wrist. No wound noted to head. States she takes asa daily. Denies HA, cp, sob, dizziness, palpitations or n/v at this time. Has b/l ankle pain that has been going on x few days. Also states she's been having frequent falls, last fall a few weeks ago. Pt. suppose to be using walker but hasn't been. #20g IV placed to left AC, labs sent. MD at bedside for eval. VS done as charted, breathing well on RA. Respirations even and unlabored. Will continue to monitor.

## 2020-09-29 NOTE — ED ADULT NURSE REASSESSMENT NOTE - NS ED NURSE REASSESS COMMENT FT1
Break RN: A&Ox4, c/o mild discomfort to left hand. Pain medication given. Pt. denies any dizziness at this time. Will continue to monitor.

## 2020-09-29 NOTE — ED PROVIDER NOTE - CLINICAL SUMMARY MEDICAL DECISION MAKING FREE TEXT BOX
Pt presents with multiple presyncopal episodes resulting in falls, no preceding cp/sob/palpitations. Episodes occur at random, concern for cardiac arrythmia, will admit for tele and echo

## 2020-09-29 NOTE — ED ADULT NURSE NOTE - NSIMPLEMENTINTERV_GEN_ALL_ED
Implemented All Fall with Harm Risk Interventions:  [place] to call system. Call bell, personal items and telephone within reach. Instruct patient to call for assistance. Room bathroom lighting operational. Non-slip footwear when patient is off stretcher. Physically safe environment: no spills, clutter or unnecessary equipment. Stretcher in lowest position, wheels locked, appropriate side rails in place. Provide visual cue, wrist band, yellow gown, etc. Monitor gait and stability. Monitor for mental status changes and reorient to person, place, and time. Review medications for side effects contributing to fall risk. Reinforce activity limits and safety measures with patient and family. Provide visual clues: red socks.

## 2020-09-29 NOTE — ED PROVIDER NOTE - OBJECTIVE STATEMENT
76yo female with pmh dm, htn presenting with fall, lightheadedness.  Patient was ambulating today and felt lightheaded, fell forward landing on outstretch hands.  Notes lac to lateral left hand.  Denies pain elsewhere.  States lightheadedness occurs at random, no provoking factors.  Denies symptoms at this time.  No chest pain, shortness of breath.  Had a fall last week 2/2 lightheadedness without injury.  Denies head trauma.  No headache, neck pain.  No back pain.

## 2020-09-29 NOTE — ED ADULT TRIAGE NOTE - CHIEF COMPLAINT QUOTE
pt c/o dizziness and left hand pain/laceration s/p trip and fall onto concrete at home. pt states she hit her head. Laceration noted to left hand, arrived with guaze taped to hand. No active bleeding noted at this time. Denies anticoagulant use, CP, SOB, N/V, fevers/chills. PMH: DM2, HTN

## 2020-09-29 NOTE — ED PROVIDER NOTE - ATTENDING CONTRIBUTION TO CARE
I performed a history and physical exam of the patient and discussed their management with the resident. I reviewed the resident's note and agree with the documented findings and plan of care. I have edited as appropriate. My medical decision making and observations are found above.  NAD, well appearing,CTAB

## 2020-09-29 NOTE — ED PROVIDER NOTE - PHYSICAL EXAMINATION
General appearance: NAD, conversant, afebrile    Neck: Trachea midline; Full range of motion, supple   Pulm: CTA bl, normal respiratory effort and no intercostal retractions, normal work of breathing   CV: RRR, No murmurs, rubs, or gallops   Extremities: No peripheral edema, laceration 2cm lateral left hand, FROM, 5/5 MS x4, gross sensation intact,   Skin: Dry, normal temperature, turgor and texture; no rash   Psych: Appropriate affect, cooperative; alert and oriented to person, place and time

## 2020-09-30 DIAGNOSIS — R42 DIZZINESS AND GIDDINESS: ICD-10-CM

## 2020-09-30 DIAGNOSIS — I10 ESSENTIAL (PRIMARY) HYPERTENSION: ICD-10-CM
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
## 2020-09-30 DIAGNOSIS — Z29.9 ENCOUNTER FOR PROPHYLACTIC MEASURES, UNSPECIFIED: ICD-10-CM

## 2020-09-30 DIAGNOSIS — E03.9 HYPOTHYROIDISM, UNSPECIFIED: ICD-10-CM

## 2020-09-30 DIAGNOSIS — E11.9 TYPE 2 DIABETES MELLITUS WITHOUT COMPLICATIONS: ICD-10-CM

## 2020-09-30 LAB
ANION GAP SERPL CALC-SCNC: 14 MMO/L — SIGNIFICANT CHANGE UP (ref 7–14)
BUN SERPL-MCNC: 29 MG/DL — HIGH (ref 7–23)
CALCIUM SERPL-MCNC: 9.8 MG/DL — SIGNIFICANT CHANGE UP (ref 8.4–10.5)
CEA SERPL-MCNC: 6 NG/ML
CHLORIDE SERPL-SCNC: 108 MMOL/L — HIGH (ref 98–107)
CO2 SERPL-SCNC: 21 MMOL/L — LOW (ref 22–31)
CREAT SERPL-MCNC: 0.99 MG/DL — SIGNIFICANT CHANGE UP (ref 0.5–1.3)
GLUCOSE BLDC GLUCOMTR-MCNC: 126 MG/DL — HIGH (ref 70–99)
GLUCOSE BLDC GLUCOMTR-MCNC: 129 MG/DL — HIGH (ref 70–99)
GLUCOSE BLDC GLUCOMTR-MCNC: 141 MG/DL — HIGH (ref 70–99)
GLUCOSE BLDC GLUCOMTR-MCNC: 174 MG/DL — HIGH (ref 70–99)
GLUCOSE SERPL-MCNC: 162 MG/DL — HIGH (ref 70–99)
HCT VFR BLD CALC: 31.9 % — LOW (ref 34.5–45)
HGB BLD-MCNC: 10.8 G/DL — LOW (ref 11.5–15.5)
MAGNESIUM SERPL-MCNC: 1.8 MG/DL — SIGNIFICANT CHANGE UP (ref 1.6–2.6)
MCHC RBC-ENTMCNC: 31 PG — SIGNIFICANT CHANGE UP (ref 27–34)
MCHC RBC-ENTMCNC: 33.9 % — SIGNIFICANT CHANGE UP (ref 32–36)
MCV RBC AUTO: 91.7 FL — SIGNIFICANT CHANGE UP (ref 80–100)
NRBC # FLD: 0 K/UL — SIGNIFICANT CHANGE UP (ref 0–0)
PHOSPHATE SERPL-MCNC: 3.8 MG/DL — SIGNIFICANT CHANGE UP (ref 2.5–4.5)
PLATELET # BLD AUTO: 189 K/UL — SIGNIFICANT CHANGE UP (ref 150–400)
PMV BLD: 9.7 FL — SIGNIFICANT CHANGE UP (ref 7–13)
POTASSIUM SERPL-MCNC: 4.5 MMOL/L — SIGNIFICANT CHANGE UP (ref 3.5–5.3)
POTASSIUM SERPL-SCNC: 4.5 MMOL/L — SIGNIFICANT CHANGE UP (ref 3.5–5.3)
RBC # BLD: 3.48 M/UL — LOW (ref 3.8–5.2)
RBC # FLD: 12.6 % — SIGNIFICANT CHANGE UP (ref 10.3–14.5)
SARS-COV-2 RNA SPEC QL NAA+PROBE: SIGNIFICANT CHANGE UP
SODIUM SERPL-SCNC: 143 MMOL/L — SIGNIFICANT CHANGE UP (ref 135–145)
TSH SERPL-MCNC: 1.82 UIU/ML — SIGNIFICANT CHANGE UP (ref 0.27–4.2)
WBC # BLD: 5.55 K/UL — SIGNIFICANT CHANGE UP (ref 3.8–10.5)
WBC # FLD AUTO: 5.55 K/UL — SIGNIFICANT CHANGE UP (ref 3.8–10.5)

## 2020-09-30 PROCEDURE — 99233 SBSQ HOSP IP/OBS HIGH 50: CPT

## 2020-09-30 PROCEDURE — 70553 MRI BRAIN STEM W/O & W/DYE: CPT | Mod: 26

## 2020-09-30 RX ORDER — ERGOCALCIFEROL 1.25 MG/1
1 CAPSULE ORAL
Qty: 0 | Refills: 0 | DISCHARGE

## 2020-09-30 RX ORDER — MIRTAZAPINE 45 MG/1
1 TABLET, ORALLY DISINTEGRATING ORAL
Qty: 0 | Refills: 0 | DISCHARGE

## 2020-09-30 RX ORDER — PREGABALIN 225 MG/1
1 CAPSULE ORAL
Qty: 0 | Refills: 0 | DISCHARGE

## 2020-09-30 RX ORDER — LEVOTHYROXINE SODIUM 125 MCG
75 TABLET ORAL DAILY
Refills: 0 | Status: DISCONTINUED | OUTPATIENT
Start: 2020-09-30 | End: 2020-10-04

## 2020-09-30 RX ORDER — LEVOTHYROXINE SODIUM 125 MCG
1 TABLET ORAL
Qty: 0 | Refills: 0 | DISCHARGE

## 2020-09-30 RX ORDER — SIMVASTATIN 20 MG/1
1 TABLET, FILM COATED ORAL
Qty: 0 | Refills: 0 | DISCHARGE

## 2020-09-30 RX ORDER — LOSARTAN POTASSIUM 100 MG/1
1 TABLET, FILM COATED ORAL
Qty: 0 | Refills: 0 | DISCHARGE

## 2020-09-30 RX ORDER — INFLUENZA VIRUS VACCINE 15; 15; 15; 15 UG/.5ML; UG/.5ML; UG/.5ML; UG/.5ML
0.5 SUSPENSION INTRAMUSCULAR ONCE
Refills: 0 | Status: DISCONTINUED | OUTPATIENT
Start: 2020-09-30 | End: 2020-10-02

## 2020-09-30 RX ORDER — LOSARTAN POTASSIUM 100 MG/1
100 TABLET, FILM COATED ORAL DAILY
Refills: 0 | Status: DISCONTINUED | OUTPATIENT
Start: 2020-09-30 | End: 2020-10-04

## 2020-09-30 RX ORDER — SIMVASTATIN 20 MG/1
10 TABLET, FILM COATED ORAL AT BEDTIME
Refills: 0 | Status: DISCONTINUED | OUTPATIENT
Start: 2020-09-30 | End: 2020-10-04

## 2020-09-30 RX ORDER — MECLIZINE HCL 12.5 MG
25 TABLET ORAL
Refills: 0 | Status: DISCONTINUED | OUTPATIENT
Start: 2020-09-30 | End: 2020-10-04

## 2020-09-30 RX ORDER — SODIUM CHLORIDE 9 MG/ML
1000 INJECTION INTRAMUSCULAR; INTRAVENOUS; SUBCUTANEOUS
Refills: 0 | Status: DISCONTINUED | OUTPATIENT
Start: 2020-09-30 | End: 2020-10-04

## 2020-09-30 RX ADMIN — Medication 650 MILLIGRAM(S): at 11:10

## 2020-09-30 RX ADMIN — Medication 650 MILLIGRAM(S): at 02:47

## 2020-09-30 RX ADMIN — Medication 75 MICROGRAM(S): at 05:52

## 2020-09-30 RX ADMIN — SENNA PLUS 2 TABLET(S): 8.6 TABLET ORAL at 01:26

## 2020-09-30 RX ADMIN — Medication 650 MILLIGRAM(S): at 01:26

## 2020-09-30 RX ADMIN — LOSARTAN POTASSIUM 100 MILLIGRAM(S): 100 TABLET, FILM COATED ORAL at 10:10

## 2020-09-30 RX ADMIN — Medication 1: at 16:57

## 2020-09-30 RX ADMIN — ENOXAPARIN SODIUM 40 MILLIGRAM(S): 100 INJECTION SUBCUTANEOUS at 13:04

## 2020-09-30 RX ADMIN — SIMVASTATIN 10 MILLIGRAM(S): 20 TABLET, FILM COATED ORAL at 22:17

## 2020-09-30 RX ADMIN — Medication 650 MILLIGRAM(S): at 10:10

## 2020-09-30 RX ADMIN — SODIUM CHLORIDE 75 MILLILITER(S): 9 INJECTION INTRAMUSCULAR; INTRAVENOUS; SUBCUTANEOUS at 13:04

## 2020-09-30 NOTE — H&P ADULT - PROBLEM SELECTOR PLAN 1
-Pt reports unprovoked dizziness. No associated ear pain or tinnitus   -w/ nystagmus on exam as well as dysmetria. CT head neg, will check MRI to further assess cerebellum  -check orthostatics  -EKG non ischemic. Trops <6. Cath 11/19 Mild nonobstructive coronary artery disease. Monitor on telemetry. Check TTE -Pt reports unprovoked dizziness. No associated ear pain or tinnitus   -w/ nystagmus on exam as well as dysmetria. CT head neg, will check MRI to further assess cerebellum  -check orthostatics  -EKG non ischemic. Trops <6. Cath 11/19 Mild nonobstructive coronary artery disease. Monitor on telemetry. Check TTE  -ddx includes BPPV vs central pathology

## 2020-09-30 NOTE — CONSULT NOTE ADULT - ASSESSMENT
75 year old female with PMH of DMT2, non-obstructive CAD, hypothyroidism, HTN and vertigo presented with severe transient dizziness /near syncope yesterday.  Patient endorsed "sudden fall"  as she was walking in the yard.  She reports occasional dizziness associated with  postural change or poor sleep.  She was noted mild orthostatic hypotension.  Telemetry recorded episodes of sinus bradyarrhythmia with HR down to 40's 45-49  ?[place].  Discussed with patient and her family regarding the potential benefit of an implantable loop recorder for long term monitoring given her baseline mild sinus node dysfunction.  They stated understanding and agreed to proceed with implantable loop recorder.   -Echocardiogram  -ILR on 10/1  -Continue telemetry monitoring for symptomatic justen or HR <40   -Discussed with Dr. Ashby

## 2020-09-30 NOTE — H&P ADULT - ASSESSMENT
76yo female with PMH DM, HTN, HLD, hypothyroidism presenting s/p fall admitted for workup of dizziness

## 2020-09-30 NOTE — H&P ADULT - PROBLEM SELECTOR PLAN 2
-likely 2/2 to dysequilibrium, no associated syncope. Pt denies LOC  -workup as above  -PT evaluation  -s/p lac repair

## 2020-09-30 NOTE — CONSULT NOTE ADULT - SUBJECTIVE AND OBJECTIVE BOX
CARDIOLOGY CONSULT - Dr. Salgado         HPI:  74yo female with PMH DM, HTN, HLD, hypothryoidism presenting s/p fall. Patient reports she was ambulating today and felt lightheaded, fell forward landing on outstretch hands.  Notes lac to lateral left hand.  Denies pain elsewhere.  States lightheadedness occurs at random, but she notes it happens whenever "I get irritated".  Denies LOC or head trauma. Denies urinary or bowel incontinence. No associated chest pain or shortness of breath.  Denies ear pain or tinnitus. Had a fall last week 2/2 lightheadedness without injury.  Endorses gait instability, states she feels unbalanced especially during episodes of lightheadedness/dizziness. No headache, neck pain, weakness or numbness.  No back pain. Reports long history of lightheadedness/dizziness. Was started on meclizine with some improvement. Also endorses poor PO intake 2/2 anorexia.       PAST MEDICAL & SURGICAL HISTORY:  Type 2 diabetes mellitus  diet controlled    Hypothyroidism    Essential hypertension  HTN (hypertension)    Obesity    S/P hysterectomy    H/O ventral hernia repair  w/ mesh    S/P cholecystectomy    S/P Appendectomy            PREVIOUS DIAGNOSTIC TESTING:    [ ] Echocardiogram:    [ x]  Catheterization:    < from: Cardiac Cath Lab - Adult (11.15.19 @ 10:27) >  DIAGNOSTIC RECOMMENDATIONS: Continue current medical management.  Continue routine post-cath care.  Discharge home today to followup with referring physician in 1-2 weeks.  INTERVENTIONAL IMPRESSIONS: Mild nonobstructive coronary arery disease.  Normal LVEF.  INTERVENTIONAL RECOMMENDATIONS: Continue current medical management.  Continue routine post-cath care.  Discharge home today to followup with referring physician in 1-2 weeks.  DISPOSITION: The patient left the catheterization laboratory in stable  condition.  Prepared and signed by  Cortes Salgado M.D.    [x ] Stress Test:  	    < from: Nuclear Stress Test-Exercise (12.18.16 @ 09:58) >  Post-stress gated wall motion analysis was performed (LVEF  = 63 %;LVEDV = 86 ml.), revealing normal LV function.  There was no segmental wall motion abnormality. RV  function appeared normal.  ------------------------------------------------------------------------  IMPRESSIONS:Normal Study  * Myocardial Perfusion SPECT results are borderline.  * There is a small, mild defect in proximal to mid  inferolateral wall that is predominantly reversible,  suggestive of mild ischemia. Of note, the observed  abnormality was not present on prone imaging for  attenuation correction.  * Post-stress gated wall motion analysis was performed  (LVEF = 63 %;LVEDV = 86 ml.), revealing normal LV  function. There was no segmental wall motion abnormality.  RV function appeared normal.  * Based upon the above findings, medical therapy and  follow-up testing is suggested.  ------------------------------------------------------------------------  Revised on  12/18/2016 - 12:34:58 by Drake Chou MD, RPVI  Confirmed on  12/19/2016 - 14:50:00 by Rohith Renee M.D.  ------------------------------------------------------------------------    < end of copied text >    MEDICATIONS:  Home Medications:  B-12 1000 mcg oral tablet: 1 tab(s) orally once a day (30 Sep 2020 10:50)  levothyroxine 75 mcg (0.075 mg) oral tablet: 1 tab(s) orally once a day (30 Sep 2020 10:50)  losartan 100 mg oral tablet: 1 tab(s) orally once a day (30 Sep 2020 10:50)  meclizine 25 mg oral tablet: 1 tab(s) orally 2 times a day, As Needed (30 Sep 2020 10:50)  meloxicam 15 mg oral tablet: 1 tab(s) orally once a day, As Needed (30 Sep 2020 10:50)  mirtazapine 15 mg oral tablet: 1 tab(s) orally once a day (at bedtime) (30 Sep 2020 10:50)  simvastatin 10 mg oral tablet: 1 tab(s) orally once a day (at bedtime) (30 Sep 2020 10:50)  Vitamin D2 50,000 intl units (1.25 mg) oral capsule: 1 cap(s) orally once a week (30 Sep 2020 10:50)      MEDICATIONS  (STANDING):  dextrose 5%. 1000 milliLiter(s) (50 mL/Hr) IV Continuous <Continuous>  dextrose 50% Injectable 12.5 Gram(s) IV Push once  dextrose 50% Injectable 25 Gram(s) IV Push once  dextrose 50% Injectable 25 Gram(s) IV Push once  enoxaparin Injectable 40 milliGRAM(s) SubCutaneous daily  influenza   Vaccine 0.5 milliLiter(s) IntraMuscular once  insulin lispro (HumaLOG) corrective regimen sliding scale   SubCutaneous three times a day before meals  insulin lispro (HumaLOG) corrective regimen sliding scale   SubCutaneous at bedtime  levothyroxine 75 MICROGram(s) Oral daily  losartan 100 milliGRAM(s) Oral daily  senna 2 Tablet(s) Oral at bedtime  simvastatin 10 milliGRAM(s) Oral at bedtime      FAMILY HISTORY:  Family history of uterine cancer (Mother)        SOCIAL HISTORY:    [ ] Non-smoker  [ ] Smoker  [ ] Alcohol    Allergies    No Known Allergies    Intolerances    	    REVIEW OF SYSTEMS:  CONSTITUTIONAL: No fever, weight loss, or fatigue  EYES: No eye pain, visual disturbances, or discharge  ENMT:  No difficulty hearing, tinnitus, vertigo; No sinus or throat pain  NECK: No pain or stiffness  RESPIRATORY: No cough, wheezing, chills or hemoptysis; No Shortness of Breath  CARDIOVASCULAR: No chest pain, palpitations, passing out, dizziness, or leg swelling  GASTROINTESTINAL: No abdominal or epigastric pain. No nausea, vomiting, or hematemesis; No diarrhea or constipation. No melena or hematochezia.  GENITOURINARY: No dysuria, frequency, hematuria, or incontinence  NEUROLOGICAL: No headaches, memory loss, loss of strength, numbness, or tremors  SKIN: No itching, burning, rashes, or lesions   	    [ ] All others negative	  [ ] Unable to obtain    PHYSICAL EXAM:  T(C): 36.8 (09-30-20 @ 05:49), Max: 36.8 (09-29-20 @ 20:57)  HR: 62 (09-30-20 @ 09:56) (52 - 65)  BP: 164/53 (09-30-20 @ 09:56) (131/62 - 169/61)  RR: 16 (09-30-20 @ 05:49) (15 - 18)  SpO2: 98% (09-30-20 @ 05:49) (97% - 100%)  Wt(kg): --  I&O's Summary      Appearance: Normal	  Psychiatry: A & O x 3, Mood & affect appropriate  HEENT:   Normal oral mucosa, PERRL, EOMI	  Lymphatic: No lymphadenopathy  Cardiovascular: Normal S1 S2,RRR, No JVD, No murmurs  Respiratory: Lungs clear to auscultation	  Gastrointestinal:  Soft, Non-tender, + BS	  Skin: No rashes, No ecchymoses, No cyanosis	  Neurologic: Non-focal  Extremities: Normal range of motion, No clubbing, cyanosis or edema  Vascular: Peripheral pulses palpable 2+ bilaterally    TELEMETRY: 	    ECG:  	  RADIOLOGY:  OTHER: 	  	  LABS:	 	    CARDIAC MARKERS:  Troponin T, High Sensitivity: 11 ng/L (09-29 @ 16:30)  Troponin T, High Sensitivity: 11 ng/L (09-29 @ 13:55)                                  10.8   5.55  )-----------( 189      ( 30 Sep 2020 06:51 )             31.9     09-30    143  |  108<H>  |  29<H>  ----------------------------<  162<H>  4.5   |  21<L>  |  0.99    Ca    9.8      30 Sep 2020 06:51  Phos  3.8     09-30  Mg     1.8     09-30    TPro  6.8  /  Alb  4.4  /  TBili  0.3  /  DBili  x   /  AST  20  /  ALT  20  /  AlkPhos  104  09-29      proBNP:   Lipid Profile:   HgA1c:   TSH: Thyroid Stimulating Hormone, Serum: 1.82 uIU/mL (09-30 @ 06:51)         CARDIOLOGY CONSULT - Dr. Salgado         HPI:  76yo female with PMH DM, HTN, HLD, hypothryoidism presenting s/p fall. Patient reports she was ambulating and felt lightheaded, dizzy fell forward landing on her hands.  Notes lac to lateral left hand.  Denies pain elsewhere.  States lightheadedness occurs at random, but she notes it happens whenever "I get irritated" or make fast movements.  Denies LOC or head trauma. Denies urinary or bowel incontinence. No associated chest pain or shortness of breath.  Denies ear pain or tinnitus. Had a fall last week 2/2 lightheadedness without injury.  Endorses gait instability, states she feels unbalanced especially during episodes of lightheadedness/dizziness. No headache, neck pain, weakness or numbness.  No back pain. Reports long history of lightheadedness/dizziness. Was started on meclizine with some improvement. Also endorses poor PO intake 2/2 anorexia. pt is known from prior admission. Cath from 11/15/19 : Mild nonobstructive coronary artery disease. Normal LVEF.  tele SB hrs as low as 45, with 1st degree avb. pacs , ROS otherwise negative         PAST MEDICAL & SURGICAL HISTORY:  Type 2 diabetes mellitus  diet controlled    Hypothyroidism    Essential hypertension  HTN (hypertension)    Obesity    S/P hysterectomy    H/O ventral hernia repair  w/ mesh    S/P cholecystectomy    S/P Appendectomy            PREVIOUS DIAGNOSTIC TESTING:    [ ] Echocardiogram:    [ x]  Catheterization:    < from: Cardiac Cath Lab - Adult (11.15.19 @ 10:27) >  DIAGNOSTIC RECOMMENDATIONS: Continue current medical management.  Continue routine post-cath care.  Discharge home today to followup with referring physician in 1-2 weeks.  INTERVENTIONAL IMPRESSIONS: Mild nonobstructive coronary arery disease.  Normal LVEF.  INTERVENTIONAL RECOMMENDATIONS: Continue current medical management.  Continue routine post-cath care.  Discharge home today to followup with referring physician in 1-2 weeks.  DISPOSITION: The patient left the catheterization laboratory in stable  condition.  Prepared and signed by  Cortes Salgado M.D.    [x ] Stress Test:  	    < from: Nuclear Stress Test-Exercise (12.18.16 @ 09:58) >  Post-stress gated wall motion analysis was performed (LVEF  = 63 %;LVEDV = 86 ml.), revealing normal LV function.  There was no segmental wall motion abnormality. RV  function appeared normal.  ------------------------------------------------------------------------  IMPRESSIONS:Normal Study  * Myocardial Perfusion SPECT results are borderline.  * There is a small, mild defect in proximal to mid  inferolateral wall that is predominantly reversible,  suggestive of mild ischemia. Of note, the observed  abnormality was not present on prone imaging for  attenuation correction.  * Post-stress gated wall motion analysis was performed  (LVEF = 63 %;LVEDV = 86 ml.), revealing normal LV  function. There was no segmental wall motion abnormality.  RV function appeared normal.  * Based upon the above findings, medical therapy and  follow-up testing is suggested.  ------------------------------------------------------------------------  Revised on  12/18/2016 - 12:34:58 by Drake Chou MD, RPVI  Confirmed on  12/19/2016 - 14:50:00 by Rohith Renee M.D.  ------------------------------------------------------------------------    < end of copied text >    MEDICATIONS:  Home Medications:  B-12 1000 mcg oral tablet: 1 tab(s) orally once a day (30 Sep 2020 10:50)  levothyroxine 75 mcg (0.075 mg) oral tablet: 1 tab(s) orally once a day (30 Sep 2020 10:50)  losartan 100 mg oral tablet: 1 tab(s) orally once a day (30 Sep 2020 10:50)  meclizine 25 mg oral tablet: 1 tab(s) orally 2 times a day, As Needed (30 Sep 2020 10:50)  meloxicam 15 mg oral tablet: 1 tab(s) orally once a day, As Needed (30 Sep 2020 10:50)  mirtazapine 15 mg oral tablet: 1 tab(s) orally once a day (at bedtime) (30 Sep 2020 10:50)  simvastatin 10 mg oral tablet: 1 tab(s) orally once a day (at bedtime) (30 Sep 2020 10:50)  Vitamin D2 50,000 intl units (1.25 mg) oral capsule: 1 cap(s) orally once a week (30 Sep 2020 10:50)      MEDICATIONS  (STANDING):  dextrose 5%. 1000 milliLiter(s) (50 mL/Hr) IV Continuous <Continuous>  dextrose 50% Injectable 12.5 Gram(s) IV Push once  dextrose 50% Injectable 25 Gram(s) IV Push once  dextrose 50% Injectable 25 Gram(s) IV Push once  enoxaparin Injectable 40 milliGRAM(s) SubCutaneous daily  influenza   Vaccine 0.5 milliLiter(s) IntraMuscular once  insulin lispro (HumaLOG) corrective regimen sliding scale   SubCutaneous three times a day before meals  insulin lispro (HumaLOG) corrective regimen sliding scale   SubCutaneous at bedtime  levothyroxine 75 MICROGram(s) Oral daily  losartan 100 milliGRAM(s) Oral daily  senna 2 Tablet(s) Oral at bedtime  simvastatin 10 milliGRAM(s) Oral at bedtime      FAMILY HISTORY:  Family history of uterine cancer (Mother)        SOCIAL HISTORY:    [x ] Non-smoker  [ ] Smoker  [ ] Alcohol    Allergies    No Known Allergies    Intolerances    	    REVIEW OF SYSTEMS:  CONSTITUTIONAL: No fever, weight loss, or fatigue  EYES: No eye pain, visual disturbances, or discharge  ENMT:  No difficulty hearing, tinnitus, vertigo; No sinus or throat pain  NECK: No pain or stiffness  RESPIRATORY: No cough, wheezing, chills or hemoptysis; No Shortness of Breath  CARDIOVASCULAR:  see hpi  GASTROINTESTINAL: No abdominal or epigastric pain. No nausea, vomiting, or hematemesis; No diarrhea or constipation. No melena or hematochezia.  GENITOURINARY: No dysuria, frequency, hematuria, or incontinence  NEUROLOGICAL: No headaches, memory loss, loss of strength, numbness, or tremors  SKIN: No itching, burning, rashes, or lesions   	    [x] All others negative	  [ ] Unable to obtain    PHYSICAL EXAM:  T(C): 36.8 (09-30-20 @ 05:49), Max: 36.8 (09-29-20 @ 20:57)  HR: 62 (09-30-20 @ 09:56) (52 - 65)  BP: 164/53 (09-30-20 @ 09:56) (131/62 - 169/61)  RR: 16 (09-30-20 @ 05:49) (15 - 18)  SpO2: 98% (09-30-20 @ 05:49) (97% - 100%)  Wt(kg): --  I&O's Summary      Appearance: Normal	  Psychiatry: A & O x 3, Mood & affect appropriate  HEENT:   Normal oral mucosa, PERRL, EOMI	  Lymphatic: No lymphadenopathy  Cardiovascular: Normal S1 S2,RRR, No JVD, No murmurs  Respiratory: Lungs clear to auscultation	  Gastrointestinal:  Soft, Non-tender, + BS	  Skin: No rashes, No ecchymoses, No cyanosis	  Neurologic: Non-focal  Extremities: Normal range of motion, No clubbing, cyanosis or edema  Vascular: Peripheral pulses palpable 2+ bilaterally    TELEMETRY: SB/ SA hr 45- 50, intermittently going into [place] 	    ECG: SB hr 59 bpm  	  RADIOLOGY:    < from: CT Head No Cont (09.29.20 @ 16:43) >  FINDINGS:  VENTRICLES AND SULCI:  Normal.  INTRA-AXIAL:  No mass, blood or abnormal attenuation is seen.  EXTRA-AXIAL:  No mass or collection is seen.  VISUALIZED SINUSES:  Clear.  VISUALIZED MASTOIDS:  Clear.  CALVARIUM:  Normal.  MISCELLANEOUS:  Intraocular lens implants    IMPRESSION:  Normal non-contrast CT of the brain.          < end of copied text >  OTHER: 	  	  LABS:	 	    CARDIAC MARKERS:  Troponin T, High Sensitivity: 11 ng/L (09-29 @ 16:30)  Troponin T, High Sensitivity: 11 ng/L (09-29 @ 13:55)                                  10.8   5.55  )-----------( 189      ( 30 Sep 2020 06:51 )             31.9     09-30    143  |  108<H>  |  29<H>  ----------------------------<  162<H>  4.5   |  21<L>  |  0.99    Ca    9.8      30 Sep 2020 06:51  Phos  3.8     09-30  Mg     1.8     09-30    TPro  6.8  /  Alb  4.4  /  TBili  0.3  /  DBili  x   /  AST  20  /  ALT  20  /  AlkPhos  104  09-29      proBNP:   Lipid Profile:   HgA1c:   TSH: Thyroid Stimulating Hormone, Serum: 1.82 uIU/mL (09-30 @ 06:51)

## 2020-09-30 NOTE — H&P ADULT - NSHPLABSRESULTS_GEN_ALL_CORE
(09-29 @ 13:55)                      11.6  5.77 )-----------( 204                 34.0    Neutrophils = 4.01 (69.5%)  Lymphocytes = 1.17 (20.3%)  Eosinophils = 0.09 (1.6%)  Basophils = 0.04 (0.7%)  Monocytes = 0.44 (7.6%)  Bands = --%    09-29    140  |  106  |  30<H>  ----------------------------<  164<H>  4.8   |  23  |  0.99    Ca    10.1      29 Sep 2020 13:55    TPro  6.8  /  Alb  4.4  /  TBili  0.3  /  DBili  x   /  AST  20  /  ALT  20  /  AlkPhos  104  09-29        < from: CT Head No Cont (09.29.20 @ 16:43) >    IMPRESSION:  Normal non-contrast CT of the brain.      < end of copied text >    < from: Xray Hand 3 Views, Left (09.29.20 @ 15:10) >      IMPRESSION:  Metallic ring overlies and obscures mid to distal 3rd proximal phalanx shaft.    Appearance of bandaging material over a laceration defect by 5th MCP region. No tracking gas collections or suspected radiopaque foreign bodies in or around this area.    Chronic small ovoid dystrophic calcific density projects adjacent to dorsal ulnar margin of 4th proximal phalanx head.    No dislocations or acute appearing fractures.    Degenerative spurring projects from ulnar and radial margins of thumb distal phalanx base. Otherwise preserved joint spaces and no joint margin erosions.    Carpal bones normally aligned.    Neutral ulnar variance.    Generalized osteopenia otherwise no discrete lytic or blastic lesions.    < end of copied text >    Labs and imaging reviewed  EKG: Sinus bradycardia with sinus arrhythmia (09-29 @ 13:55)                      11.6  5.77 )-----------( 204                 34.0    Neutrophils = 4.01 (69.5%)  Lymphocytes = 1.17 (20.3%)  Eosinophils = 0.09 (1.6%)  Basophils = 0.04 (0.7%)  Monocytes = 0.44 (7.6%)  Bands = --%    09-29    140  |  106  |  30<H>  ----------------------------<  164<H>  4.8   |  23  |  0.99    Ca    10.1      29 Sep 2020 13:55    TPro  6.8  /  Alb  4.4  /  TBili  0.3  /  DBili  x   /  AST  20  /  ALT  20  /  AlkPhos  104  09-29        < from: CT Head No Cont (09.29.20 @ 16:43) >    IMPRESSION:  Normal non-contrast CT of the brain.      < end of copied text >    < from: Xray Hand 3 Views, Left (09.29.20 @ 15:10) >      IMPRESSION:  Metallic ring overlies and obscures mid to distal 3rd proximal phalanx shaft.    Appearance of bandaging material over a laceration defect by 5th MCP region. No tracking gas collections or suspected radiopaque foreign bodies in or around this area.    Chronic small ovoid dystrophic calcific density projects adjacent to dorsal ulnar margin of 4th proximal phalanx head.    No dislocations or acute appearing fractures.    Degenerative spurring projects from ulnar and radial margins of thumb distal phalanx base. Otherwise preserved joint spaces and no joint margin erosions.    Carpal bones normally aligned.    Neutral ulnar variance.    Generalized osteopenia otherwise no discrete lytic or blastic lesions.    < end of copied text >    Labs and imaging reviewed  EKG: Sinus bradycardia with sinus arrhythmia. No acute ST changes

## 2020-09-30 NOTE — H&P ADULT - NSHPPHYSICALEXAM_GEN_ALL_CORE
GENERAL APPEARANCE: Well developed, well nourished, alert and cooperative. NAD.   HEENT:  PERRL, EOMI. External auditory canals normal, hearing grossly intact.  NECK: Neck supple, non-tender without lymphadenopathy, masses or thyromegaly.  CARDIAC: Normal S1 and S2. No S3, S4 or murmurs. Rhythm is regular.  LUNGS: Clear to auscultation and percussion without rales, rhonchi, wheezing or diminished breath sounds.  ABDOMEN: Positive bowel sounds. Soft, nondistended, nontender. No guarding or rebound.   MUSCULOSKELETAL: ROM intact spine and extremities. No joint erythema or tenderness.   BACK: normal gait and posture, no spinal deformity, symmetry of spinal muscles, without tenderness, decreased range of motion.  EXTREMITIES: No significant deformity or joint abnormality. No edema. Peripheral pulses intact. No varicosities.  NEUROLOGICAL: CN II-XII intact. Vertical nystagmus. Mild dysmetria. Strength and sensation symmetric and intact throughout.   SKIN: Skin normal color, texture and turgor with no lesions or eruptions.  PSYCHIATRIC: AOx3.Normal affect and behavior. GENERAL APPEARANCE: Well developed, well nourished, alert and cooperative. NAD.   HEENT:  PERRL, EOMI. External auditory canals normal, hearing grossly intact.  NECK: Neck supple, non-tender without lymphadenopathy, masses or thyromegaly.  CARDIAC: Normal S1 and S2. No S3, S4 or murmurs. Rhythm is regular.  LUNGS: Clear to auscultation and percussion without rales, rhonchi, wheezing or diminished breath sounds.  ABDOMEN: Positive bowel sounds. Soft, nondistended, nontender. No guarding or rebound.   MUSCULOSKELETAL: ROM intact spine and extremities. No joint erythema or tenderness.   BACK: normal gait and posture, no spinal deformity, symmetry of spinal muscles, without tenderness, decreased range of motion.  EXTREMITIES: No significant deformity or joint abnormality. No edema. Peripheral pulses intact. No varicosities.  NEUROLOGICAL: CN II-XII intact. Vertical nystagmus. Mild dysmetria. Strength and sensation symmetric and intact throughout.   SKIN: left hand laceration dressed  PSYCHIATRIC: AOx3.Normal affect and behavior.

## 2020-09-30 NOTE — CONSULT NOTE ADULT - ASSESSMENT
cath11/15/19 : Mild nonobstructive coronary artery disease. Normal LVEF  a/p    76yo female with PMH CAD, DM, HTN, HLD, hypothryoidism presenting s/p fall.    1. s/p fall; Near Syncope   -unclear if symptomatic secondary to conduction disease vs vertigo vs 2/2 to hypovolemia/decrease po intake  -on tele SB/ SA -intermittently going into [place]   -not on AVN meds   -orthostatics hypotension positive , start IVF   -check Echo to eval lv fx   -TSH wnl  -EP eval   -HS trops negative, no acs   -c/w meclazine     2.CAD   -cv stable, start low dose ASA      cath11/15/19 : Mild nonobstructive coronary artery disease. Normal LVEF  a/p    76yo female with PMH CAD, DM, HTN, HLD, hypothryoidism presenting s/p fall.    1. s/p fall; Near Syncope   -unclear if symptomatic secondary to conduction disease vs vertigo vs 2/2 to hypovolemia/decrease po intake  -on tele SB/ SA -intermittently going into [place]   -not on AVN meds   -orthostatics hypotension positive , start IVF   -check Echo to eval lv fx   -TSH wnl  -EP eval   -HS trops negative, no acs   -c/w meclizine     2.CAD   -cv stable, start low dose ASA

## 2020-09-30 NOTE — H&P ADULT - HISTORY OF PRESENT ILLNESS
74yo female with PMH DM, HTN, HLD, hypothryoidism presenting s/p fall. Patient reports she was ambulating today and felt lightheaded, fell forward landing on outstretch hands.  Notes lac to lateral left hand.  Denies pain elsewhere.  States lightheadedness occurs at random, but she notes it happens whenever "I get irritated".  Denies LOC or head trauma. Denies urinary or bowel incontinence. No associated chest pain or shortness of breath.  Denies ear pain or tinnitus. Had a fall last week 2/2 lightheadedness without injury.  Endorses gait instability, states she feels unbalanced especially during episodes of lightheadedness/dizziness. No headache, neck pain, weakness or numbness.  No back pain. Reports long history of lightheadedness/dizziness. Was started on meclizine with some improvement. Also endorses poor PO intake 2/2 anorexia.

## 2020-09-30 NOTE — H&P ADULT - NSHPREVIEWOFSYSTEMS_GEN_ALL_CORE
CONSTITUTIONAL:  No weight loss, fever, chills, weakness or fatigue.  HEENT:  Eyes:  No visual loss, blurred vision, double vision or yellow sclerae. Ears, Nose, Throat:  No hearing loss, sneezing, congestion, runny nose or sore throat.  SKIN:  No rash or itching.  CARDIOVASCULAR:  No chest pain, chest pressure or chest discomfort. No palpitations or edema.  RESPIRATORY:  No shortness of breath, cough or sputum.  GASTROINTESTINAL: +anorexia No nausea, vomiting or diarrhea. No abdominal pain or blood.  GENITOURINARY:  Denies hematuria, dysuria.   NEUROLOGICAL:  +dizziness +gait instability No headache, syncope, paralysis, ataxia, numbness or tingling in the extremities. No change in bowel or bladder control.  MUSCULOSKELETAL: +left hand pain No muscle, back pain, joint pain or stiffness.  HEMATOLOGIC:  No anemia, bleeding or bruising.  LYMPHATICS:  No enlarged nodes. No history of splenectomy.  PSYCHIATRIC:  No history of depression or anxiety.  ENDOCRINOLOGIC:  No reports of sweating, cold or heat intolerance. No polyuria or polydipsia.  ALLERGIES:  No history of asthma, hives, eczema or rhinitis.

## 2020-09-30 NOTE — CONSULT NOTE ADULT - ATTENDING COMMENTS
75 year old female with PMH of DMT2, non-obstructive CAD, hypothyroidism, HTN and vertigo presented with severe transient dizziness /near syncope yesterday.  Patient endorsed "sudden fall"  as she was walking in the yard.  She reports occasional dizziness associated with  postural change or poor sleep.  She was noted mild orthostatic hypotension.  Telemetry recorded episodes of sinus bradyarrhythmia with HR down to 40's 45-49  Romero 1.  Discussed with patient and her family regarding the potential benefit of an implantable loop recorder for long term monitoring given her baseline mild sinus node dysfunction.  They stated understanding and agreed to proceed with implantable loop recorder. Plan for tomorrow.
Patient seen and examined, agree with the above assessment and plan by FABIOLA Vega.  74yo female with PMH CAD, DM, HTN, HLD, hypothryoidism presenting s/p fall.  -unclear if symptomatic secondary to conduction disease vs vertigo vs 2/2 to hypovolemia/decrease po intake  -on tele SB/ SA -intermittently going into Wenckebach   -orthostatics +  -IVF  -ECHO  -EP eval for ILR

## 2020-09-30 NOTE — H&P ADULT - NSICDXFAMILYHX_GEN_ALL_CORE_FT
FAMILY HISTORY:  Mother  Still living? No  Family history of uterine cancer, Age at diagnosis: Age Unknown

## 2020-09-30 NOTE — CONSULT NOTE ADULT - SUBJECTIVE AND OBJECTIVE BOX
Patient is a 75y old  Female who presents with a chief complaint of fall (30 Sep 2020 12:04)          HPI:      75 year old female with PMH of DMT2, non-obstructive CAD, hypothyroidism, HTN and vertigo presented with severe transient dizziness /near syncope yesterday.  Patient endorsed "sudden fall"  as she was walking in the yard.  She reports occasional dizziness associated with postural change or poor sleep.  She was noted mild orthostatic hypotension.  Telemetry recorded episodes of sinus bradyarrhythmia with HR down to 40's 45-49  ?Wenckebach.  She denies any symptoms associated with bradyarrhythmia in 40's.  She is not on any AV kranthi agents.  She denies prior near syncope /syncope.           PAST MEDICAL & SURGICAL HISTORY:  Type 2 diabetes mellitus  diet controlled    Hypothyroidism    Essential hypertension  HTN (hypertension)    Obesity    S/P hysterectomy    H/O ventral hernia repair  w/ mesh    S/P cholecystectomy    S/P Appendectomy        MEDICATIONS  (STANDING):  dextrose 5%. 1000 milliLiter(s) (50 mL/Hr) IV Continuous <Continuous>  dextrose 50% Injectable 12.5 Gram(s) IV Push once  dextrose 50% Injectable 25 Gram(s) IV Push once  dextrose 50% Injectable 25 Gram(s) IV Push once  enoxaparin Injectable 40 milliGRAM(s) SubCutaneous daily  influenza   Vaccine 0.5 milliLiter(s) IntraMuscular once  insulin lispro (HumaLOG) corrective regimen sliding scale   SubCutaneous three times a day before meals  insulin lispro (HumaLOG) corrective regimen sliding scale   SubCutaneous at bedtime  levothyroxine 75 MICROGram(s) Oral daily  losartan 100 milliGRAM(s) Oral daily  senna 2 Tablet(s) Oral at bedtime  simvastatin 10 milliGRAM(s) Oral at bedtime  sodium chloride 0.9%. 1000 milliLiter(s) (75 mL/Hr) IV Continuous <Continuous>    MEDICATIONS  (PRN):  acetaminophen   Tablet .. 650 milliGRAM(s) Oral every 6 hours PRN Mild Pain (1 - 3), Moderate Pain (4 - 6)  dextrose 40% Gel 15 Gram(s) Oral once PRN Blood Glucose LESS THAN 70 milliGRAM(s)/deciliter  glucagon  Injectable 1 milliGRAM(s) IntraMuscular once PRN Glucose LESS THAN 70 milligrams/deciliter  meclizine 25 milliGRAM(s) Oral two times a day PRN Dizziness    Allergies    No Known Allergies    Intolerances      FAMILY HISTORY:  Family history of uterine cancer (Mother)        SOCIAL HISTORY:  Denies smoking; no   Alcohol  or  Drug abuse         REVIEW OF SYSTEMS:    CONSTITUTIONAL: No fever, weight loss, chills, shakes, or fatigue  EYES: No eye pain, visual disturbances, or discharge  ENMT:  No difficulty hearing, tinnitus, vertigo; No sinus or throat pain  NECK: No pain or stiffness  RESPIRATORY: No cough, wheezing, hemoptysis, or shortness of breath  CARDIOVASCULAR: No chest pain, dyspnea, palpitations,  syncope, paroxysmal nocturnal dyspnea, orthopnea, or arm or leg swelling +dizziness, +near syncope  GASTROINTESTINAL: No abdominal  or epigastric pain, nausea, vomiting, hematemesis, diarrhea, constipation, melena or bright red blood.  GENITOURINARY: No dysuria, nocturia, hematuria, or urinary incontinence  NEUROLOGICAL: No headaches, memory loss, slurred speech, limb weakness, loss of strength, numbness, or tremors  SKIN: No itching, burning, rashes, or lesions   LYMPH NODES: No enlarged glands  ENDOCRINE: No heat or cold intolerance, or hair loss  MUSCULOSKELETAL: No joint pain or swelling, muscle, back, or extremity pain  PSYCHIATRIC: No depression, anxiety, or difficulty sleeping  HEME/LYMPH: No easy bruising or bleeding gums  ALLERY AND IMMUNOLOGIC: No hives or rash.      Vital Signs Last 24 Hrs  T(C): 36.8 (30 Sep 2020 05:49), Max: 36.8 (29 Sep 2020 20:57)  T(F): 98.2 (30 Sep 2020 05:49), Max: 98.3 (29 Sep 2020 20:57)  HR: 62 (30 Sep 2020 09:56) (52 - 65)  BP: 164/53 (30 Sep 2020 09:56) (139/67 - 169/61)  BP(mean): --  RR: 16 (30 Sep 2020 05:49) (15 - 18)  SpO2: 98% (30 Sep 2020 05:49) (98% - 100%)    PHYSICAL EXAM:    GENERAL: In no apparent distress, well nourished, and hydrated.  HEAD:  Atraumatic, Normocephalic  NECK: Supple . No JVD or carotid bruit or thyroidmegaly.  Carotid pulse is 2+ bilaterally.  PULMONARY: Clear to auscultation and perfusion.  No rales, wheezing, or rhonchi bilaterally.  HEART: Regular rate and rhythm; No murmurs, rubs, or gallops.  ABDOMEN: Soft, Nontender, Nondistended; Bowel sounds present  EXTREMITIES: No clubbing, cyanosis, or edema  NEUROLOGICAL: Alert oriented to person, place and time.  Speech clear.  Skin: Dry intact, no rashes or lesions.          INTERPRETATION OF TELEMETRY:  Sinus rhythm with episodes of sinus bradyarrhythmia 45-60's bpm    ECG: sinus justen with 1 st degree AVB at 59; QRS 98 ms; QT/QTc 416/411ms        LABS:                        10.8   5.55  )-----------( 189      ( 30 Sep 2020 06:51 )             31.9     09-30    143  |  108<H>  |  29<H>  ----------------------------<  162<H>  4.5   |  21<L>  |  0.99    Ca    9.8      30 Sep 2020 06:51  Phos  3.8     09-30  Mg     1.8     09-30    TPro  6.8  /  Alb  4.4  /  TBili  0.3  /  DBili  x   /  AST  20  /  ALT  20  /  AlkPhos  104  09-29    TSH: 1.8          BNP  RADIOLOGY & ADDITIONAL STUDIES:  PREVIOUS DIAGNOSTIC TESTING:      ECHO FINDINGS:      STRESS FINDINGS:    CATHETERIZATION FINDINGS:

## 2020-10-01 LAB
ANION GAP SERPL CALC-SCNC: 12 MMO/L — SIGNIFICANT CHANGE UP (ref 7–14)
BUN SERPL-MCNC: 21 MG/DL — SIGNIFICANT CHANGE UP (ref 7–23)
CALCIUM SERPL-MCNC: 9.8 MG/DL — SIGNIFICANT CHANGE UP (ref 8.4–10.5)
CHLORIDE SERPL-SCNC: 105 MMOL/L — SIGNIFICANT CHANGE UP (ref 98–107)
CO2 SERPL-SCNC: 21 MMOL/L — LOW (ref 22–31)
CREAT SERPL-MCNC: 0.76 MG/DL — SIGNIFICANT CHANGE UP (ref 0.5–1.3)
GLUCOSE BLDC GLUCOMTR-MCNC: 123 MG/DL — HIGH (ref 70–99)
GLUCOSE BLDC GLUCOMTR-MCNC: 136 MG/DL — HIGH (ref 70–99)
GLUCOSE BLDC GLUCOMTR-MCNC: 149 MG/DL — HIGH (ref 70–99)
GLUCOSE BLDC GLUCOMTR-MCNC: 189 MG/DL — HIGH (ref 70–99)
GLUCOSE SERPL-MCNC: 125 MG/DL — HIGH (ref 70–99)
HCT VFR BLD CALC: 36.4 % — SIGNIFICANT CHANGE UP (ref 34.5–45)
HGB BLD-MCNC: 11.9 G/DL — SIGNIFICANT CHANGE UP (ref 11.5–15.5)
MAGNESIUM SERPL-MCNC: 1.7 MG/DL — SIGNIFICANT CHANGE UP (ref 1.6–2.6)
MCHC RBC-ENTMCNC: 30.9 PG — SIGNIFICANT CHANGE UP (ref 27–34)
MCHC RBC-ENTMCNC: 32.7 % — SIGNIFICANT CHANGE UP (ref 32–36)
MCV RBC AUTO: 94.5 FL — SIGNIFICANT CHANGE UP (ref 80–100)
NRBC # FLD: 0 K/UL — SIGNIFICANT CHANGE UP (ref 0–0)
PHOSPHATE SERPL-MCNC: 3.6 MG/DL — SIGNIFICANT CHANGE UP (ref 2.5–4.5)
PLATELET # BLD AUTO: 177 K/UL — SIGNIFICANT CHANGE UP (ref 150–400)
PMV BLD: 10.4 FL — SIGNIFICANT CHANGE UP (ref 7–13)
POTASSIUM SERPL-MCNC: 4.1 MMOL/L — SIGNIFICANT CHANGE UP (ref 3.5–5.3)
POTASSIUM SERPL-SCNC: 4.1 MMOL/L — SIGNIFICANT CHANGE UP (ref 3.5–5.3)
RBC # BLD: 3.85 M/UL — SIGNIFICANT CHANGE UP (ref 3.8–5.2)
RBC # FLD: 12.4 % — SIGNIFICANT CHANGE UP (ref 10.3–14.5)
SARS-COV-2 IGG SERPL QL IA: NEGATIVE — SIGNIFICANT CHANGE UP
SARS-COV-2 IGM SERPL IA-ACNC: <0.1 INDEX — SIGNIFICANT CHANGE UP
SODIUM SERPL-SCNC: 138 MMOL/L — SIGNIFICANT CHANGE UP (ref 135–145)
WBC # BLD: 5.08 K/UL — SIGNIFICANT CHANGE UP (ref 3.8–10.5)
WBC # FLD AUTO: 5.08 K/UL — SIGNIFICANT CHANGE UP (ref 3.8–10.5)

## 2020-10-01 PROCEDURE — 33285 INSJ SUBQ CAR RHYTHM MNTR: CPT

## 2020-10-01 RX ORDER — ASPIRIN/CALCIUM CARB/MAGNESIUM 324 MG
81 TABLET ORAL DAILY
Refills: 0 | Status: DISCONTINUED | OUTPATIENT
Start: 2020-10-01 | End: 2020-10-04

## 2020-10-01 RX ADMIN — Medication 81 MILLIGRAM(S): at 11:58

## 2020-10-01 RX ADMIN — Medication 75 MICROGRAM(S): at 05:06

## 2020-10-01 RX ADMIN — ENOXAPARIN SODIUM 40 MILLIGRAM(S): 100 INJECTION SUBCUTANEOUS at 11:58

## 2020-10-01 RX ADMIN — Medication 650 MILLIGRAM(S): at 23:25

## 2020-10-01 RX ADMIN — SIMVASTATIN 10 MILLIGRAM(S): 20 TABLET, FILM COATED ORAL at 21:25

## 2020-10-01 NOTE — PROGRESS NOTE ADULT - SUBJECTIVE AND OBJECTIVE BOX
CARDIOLOGY FOLLOW UP - Dr. Salgado    CC no cp or sob  c/o dizziness which worsens on exertion       PHYSICAL EXAM:  T(C): 36.6 (10-01-20 @ 05:03), Max: 37 (09-30-20 @ 21:00)  HR: 55 (10-01-20 @ 05:03) (55 - 59)  BP: 145/60 (10-01-20 @ 05:03) (145/60 - 160/67)  RR: 18 (10-01-20 @ 05:03) (17 - 18)  SpO2: 100% (10-01-20 @ 05:03) (100% - 100%)  Wt(kg): --  I&O's Summary      Appearance: Normal	  Cardiovascular: Normal S1 S2,RRR, No JVD, No murmurs  Respiratory: Lungs clear to auscultation	  Gastrointestinal:  Soft, Non-tender, + BS	  Extremities: Normal range of motion, No clubbing, cyanosis or edema        MEDICATIONS  (STANDING):  dextrose 5%. 1000 milliLiter(s) (50 mL/Hr) IV Continuous <Continuous>  dextrose 50% Injectable 12.5 Gram(s) IV Push once  dextrose 50% Injectable 25 Gram(s) IV Push once  dextrose 50% Injectable 25 Gram(s) IV Push once  enoxaparin Injectable 40 milliGRAM(s) SubCutaneous daily  influenza   Vaccine 0.5 milliLiter(s) IntraMuscular once  insulin lispro (HumaLOG) corrective regimen sliding scale   SubCutaneous three times a day before meals  insulin lispro (HumaLOG) corrective regimen sliding scale   SubCutaneous at bedtime  levothyroxine 75 MICROGram(s) Oral daily  losartan 100 milliGRAM(s) Oral daily  senna 2 Tablet(s) Oral at bedtime  simvastatin 10 milliGRAM(s) Oral at bedtime  sodium chloride 0.9%. 1000 milliLiter(s) (75 mL/Hr) IV Continuous <Continuous>      TELEMETRY: bradyarrhythmia hr 45-58  with Wenckebach, sinus pause noted 1.50	    ECG:  	  RADIOLOGY:   DIAGNOSTIC TESTING:  [ ] Echocardiogram:  [ ]  Catheterization:  [ ] Stress Test:    OTHER: 	    LABS:	 	    Troponin T, High Sensitivity: 11 ng/L [<6 - 14] (09-29 @ 16:30)  Troponin T, High Sensitivity: 11 ng/L [<6 - 14] (09-29 @ 13:55)                          11.9   5.08  )-----------( 177      ( 01 Oct 2020 05:41 )             36.4     10-01    138  |  105  |  21  ----------------------------<  125<H>  4.1   |  21<L>  |  0.76    Ca    9.8      01 Oct 2020 05:41  Phos  3.6     10-01  Mg     1.7     10-01    TPro  6.8  /  Alb  4.4  /  TBili  0.3  /  DBili  x   /  AST  20  /  ALT  20  /  AlkPhos  104  09-29

## 2020-10-01 NOTE — CHART NOTE - NSCHARTNOTEFT_GEN_A_CORE
Type of Procedure: ILR implant  Licensed independent practitioner: Suzy Ashby MD  Assistant: None  Description of procedure:   Written informed consent was obtained from the patient after a full explanation of the risks and benefits of  the procedure. The patient was brought to the lab in the fasting state. Continuous electrocardiographic and  hemodynamic monitoring was initiated. The patient was prepped and draped in the usual sterile fashion.   Local anesthetic was delivered and a 1 cm diagonal incision was made just lateral to the sternum using the  incision tool supplied by the . Using the insertion tool, a subcutaneous tunnel was created  approximately 8 mm under the skin. The insertion tool was then rotated 180 degrees to create a pocket for the  device, and the preloaded device was then inserted into the pocket. The device was held in place while the  insertion tool was removed. Hemostasis was achieved with manual pressure. Dermabond was then placed  over the incision. The wound was covered with a dry sterile dressing. The procedure was well  tolerated and the patient left the laboratory alert and in good condition. Patient education regarding device  triggering was performed prior to discharge.  During the procedure, a MDT rep was present to manage a complex .  Programming was as follows:  - Tachycardia: 171 bpm), 16 beats duration  - Bradycardia: >2,000ms (  - Pause: >3 seconds  - AF detection: ON    Estimated blood loss: 0cc  Specimen removed: N/A  Preoperative Dx: Syncope  Postoperative Dx: Syncope  Complications: None  Anesthesia type: Local

## 2020-10-01 NOTE — CHART NOTE - NSCHARTNOTEFT_GEN_A_CORE
Tele reviewed:  SR with SB down to 45 bpm and pauses up to 1.5 seconds.  Patient denies dizziness.  Scheduled for implantable loop recorder today.  Post Loop implantation teaching provided.  Follow up on 10/15 at 12:30p .  Oncology Building 4 th Floor at [facility]  [phone].

## 2020-10-01 NOTE — PROGRESS NOTE ADULT - SUBJECTIVE AND OBJECTIVE BOX
Patient is a 75y old  Female who presents with a chief complaint of fall (01 Oct 2020 10:22)      INTERVAL HPI/OVERNIGHT EVENTS:  T(C): 36.6 (10-01-20 @ 05:03), Max: 37 (09-30-20 @ 21:00)  HR: 55 (10-01-20 @ 05:03) (55 - 57)  BP: 145/60 (10-01-20 @ 05:03) (145/60 - 160/65)  RR: 18 (10-01-20 @ 05:03) (18 - 18)  SpO2: 100% (10-01-20 @ 05:03) (100% - 100%)  Wt(kg): --  I&O's Summary      LABS:                        11.9   5.08  )-----------( 177      ( 01 Oct 2020 05:41 )             36.4     10-01    138  |  105  |  21  ----------------------------<  125<H>  4.1   |  21<L>  |  0.76    Ca    9.8      01 Oct 2020 05:41  Phos  3.6     10-01  Mg     1.7     10-01          CAPILLARY BLOOD GLUCOSE      POCT Blood Glucose.: 149 mg/dL (01 Oct 2020 11:48)  POCT Blood Glucose.: 136 mg/dL (01 Oct 2020 06:32)  POCT Blood Glucose.: 126 mg/dL (30 Sep 2020 22:13)  POCT Blood Glucose.: 174 mg/dL (30 Sep 2020 16:54)            MEDICATIONS  (STANDING):  aspirin enteric coated 81 milliGRAM(s) Oral daily  dextrose 5%. 1000 milliLiter(s) (50 mL/Hr) IV Continuous <Continuous>  dextrose 50% Injectable 12.5 Gram(s) IV Push once  dextrose 50% Injectable 25 Gram(s) IV Push once  dextrose 50% Injectable 25 Gram(s) IV Push once  enoxaparin Injectable 40 milliGRAM(s) SubCutaneous daily  influenza   Vaccine 0.5 milliLiter(s) IntraMuscular once  insulin lispro (HumaLOG) corrective regimen sliding scale   SubCutaneous three times a day before meals  insulin lispro (HumaLOG) corrective regimen sliding scale   SubCutaneous at bedtime  levothyroxine 75 MICROGram(s) Oral daily  losartan 100 milliGRAM(s) Oral daily  senna 2 Tablet(s) Oral at bedtime  simvastatin 10 milliGRAM(s) Oral at bedtime  sodium chloride 0.9%. 1000 milliLiter(s) (75 mL/Hr) IV Continuous <Continuous>    MEDICATIONS  (PRN):  acetaminophen   Tablet .. 650 milliGRAM(s) Oral every 6 hours PRN Mild Pain (1 - 3), Moderate Pain (4 - 6)  dextrose 40% Gel 15 Gram(s) Oral once PRN Blood Glucose LESS THAN 70 milliGRAM(s)/deciliter  glucagon  Injectable 1 milliGRAM(s) IntraMuscular once PRN Glucose LESS THAN 70 milligrams/deciliter  meclizine 25 milliGRAM(s) Oral two times a day PRN Dizziness          PHYSICAL EXAM:  GENERAL: NAD, well-groomed, well-developed  HEAD:  Atraumatic, Normocephalic  CHEST/LUNG: Clear to percussion bilaterally; No rales, rhonchi, wheezing, or rubs  HEART: Regular rate and rhythm; No murmurs, rubs, or gallops  ABDOMEN: Soft, Nontender, Nondistended; Bowel sounds present  EXTREMITIES:  2+ Peripheral Pulses, No clubbing, cyanosis, or edema  LYMPH: No lymphadenopathy noted  SKIN: No rashes or lesions    Care Discussed with Consultants/Other Providers [ ] YES  [ ] NO

## 2020-10-01 NOTE — PROGRESS NOTE ADULT - ASSESSMENT
76yo female with PMH DM, HTN, HLD, hypothyroidism presenting s/p fall admitted for workup of dizziness    Problem/Plan - 1:  ·  Problem: Dizziness.  Plan: -Pt reports unprovoked dizziness. No associated ear pain or tinnitus   -w/ nystagmus on exam as well as dysmetria. CT head neg, will check MRI to further assess cerebellum  - cards and neuro fu  - ILR today  - EP fu     Problem/Plan - 2:  ·  Problem: Fall, initial encounter.  Plan: -likely 2/2 to dysequilibrium, no associated syncope. Pt denies LOC  -workup as above  -PT evaluation  -s/p lac repair.     Problem/Plan - 3:  ·  Problem: Type 2 diabetes mellitus.  Plan: -uncontrolled  -holding oral hypoglycemics   -c/w ISS      Problem/Plan - 4:·  Problem: Hypothyroidism.  Plan: -c/w levothyroxine     Problem/Plan - 5:  ·  Problem: Essential hypertension.  Plan: -c/w losartan.     Problem/Plan - 6:  Problem: Need for prophylactic measure. Plan: DVT ppx: lovenox  PT eval.

## 2020-10-01 NOTE — PROGRESS NOTE ADULT - ASSESSMENT
cath11/15/19 : Mild nonobstructive coronary artery disease. Normal LVEF  a/p    74yo female with PMH CAD, DM, HTN, HLD, hypothryoidism presenting s/p fall.    1. s/p fall; Near Syncope   -unclear if symptomatic secondary to conduction disease vs vertigo vs 2/2 to hypovolemia/decrease po intake  -on tele Sinus bradyarrhythmia -intermittently going into Wenckebach   -not on AVN meds   -orthostatics hypotension positive 9/30 , s/p IVF   -repeat orthostatics   -check Echo to eval lv fx   -TSH wnl  -EP eval noted. plan for ILR   -HS trops negative, no acs   -c/w meclizine     2.CAD   -cv stable, start  low dose ASA      cath11/15/19 : Mild nonobstructive coronary artery disease. Normal LVEF  a/p    76yo female with PMH CAD, DM, HTN, HLD, hypothryoidism presenting s/p fall.    1. s/p fall; Near Syncope   -unclear if symptomatic secondary to conduction disease vs vertigo vs 2/2 to hypovolemia/decrease po intake  -on tele Sinus bradyarrhythmia -intermittently going into [place]enckebach   -not on AVN meds   -orthostatics hypotension + on 9/30 , s/p IVF   -repeat orthostatics   -check Echo to eval lv fx   -TSH wnl  -EP eval noted. plan for ILR   -HS trops negative, no acs   -c/w meclizine     2.CAD   -cv stable, start  low dose ASA

## 2020-10-02 LAB
ANION GAP SERPL CALC-SCNC: 12 MMO/L — SIGNIFICANT CHANGE UP (ref 7–14)
BUN SERPL-MCNC: 23 MG/DL — SIGNIFICANT CHANGE UP (ref 7–23)
CALCIUM SERPL-MCNC: 9.5 MG/DL — SIGNIFICANT CHANGE UP (ref 8.4–10.5)
CHLORIDE SERPL-SCNC: 105 MMOL/L — SIGNIFICANT CHANGE UP (ref 98–107)
CO2 SERPL-SCNC: 23 MMOL/L — SIGNIFICANT CHANGE UP (ref 22–31)
CREAT SERPL-MCNC: 0.92 MG/DL — SIGNIFICANT CHANGE UP (ref 0.5–1.3)
GLUCOSE BLDC GLUCOMTR-MCNC: 105 MG/DL — HIGH (ref 70–99)
GLUCOSE BLDC GLUCOMTR-MCNC: 150 MG/DL — HIGH (ref 70–99)
GLUCOSE BLDC GLUCOMTR-MCNC: 216 MG/DL — HIGH (ref 70–99)
GLUCOSE SERPL-MCNC: 128 MG/DL — HIGH (ref 70–99)
HCT VFR BLD CALC: 33.9 % — LOW (ref 34.5–45)
HGB BLD-MCNC: 11.2 G/DL — LOW (ref 11.5–15.5)
MAGNESIUM SERPL-MCNC: 1.8 MG/DL — SIGNIFICANT CHANGE UP (ref 1.6–2.6)
MCHC RBC-ENTMCNC: 31.5 PG — SIGNIFICANT CHANGE UP (ref 27–34)
MCHC RBC-ENTMCNC: 33 % — SIGNIFICANT CHANGE UP (ref 32–36)
MCV RBC AUTO: 95.2 FL — SIGNIFICANT CHANGE UP (ref 80–100)
NRBC # FLD: 0 K/UL — SIGNIFICANT CHANGE UP (ref 0–0)
PHOSPHATE SERPL-MCNC: 3.8 MG/DL — SIGNIFICANT CHANGE UP (ref 2.5–4.5)
PLATELET # BLD AUTO: 173 K/UL — SIGNIFICANT CHANGE UP (ref 150–400)
PMV BLD: 10.3 FL — SIGNIFICANT CHANGE UP (ref 7–13)
POTASSIUM SERPL-MCNC: 4 MMOL/L — SIGNIFICANT CHANGE UP (ref 3.5–5.3)
POTASSIUM SERPL-SCNC: 4 MMOL/L — SIGNIFICANT CHANGE UP (ref 3.5–5.3)
RBC # BLD: 3.56 M/UL — LOW (ref 3.8–5.2)
RBC # FLD: 12.4 % — SIGNIFICANT CHANGE UP (ref 10.3–14.5)
SODIUM SERPL-SCNC: 140 MMOL/L — SIGNIFICANT CHANGE UP (ref 135–145)
WBC # BLD: 4.34 K/UL — SIGNIFICANT CHANGE UP (ref 3.8–10.5)
WBC # FLD AUTO: 4.34 K/UL — SIGNIFICANT CHANGE UP (ref 3.8–10.5)

## 2020-10-02 PROCEDURE — 93306 TTE W/DOPPLER COMPLETE: CPT | Mod: 26

## 2020-10-02 RX ORDER — MECLIZINE HCL 12.5 MG
12.5 TABLET ORAL
Refills: 0 | Status: DISCONTINUED | OUTPATIENT
Start: 2020-10-02 | End: 2020-10-04

## 2020-10-02 RX ORDER — INFLUENZA VIRUS VACCINE 15; 15; 15; 15 UG/.5ML; UG/.5ML; UG/.5ML; UG/.5ML
0.7 SUSPENSION INTRAMUSCULAR ONCE
Refills: 0 | Status: COMPLETED | OUTPATIENT
Start: 2020-10-02 | End: 2020-10-02

## 2020-10-02 RX ADMIN — Medication 650 MILLIGRAM(S): at 00:00

## 2020-10-02 RX ADMIN — ENOXAPARIN SODIUM 40 MILLIGRAM(S): 100 INJECTION SUBCUTANEOUS at 12:50

## 2020-10-02 RX ADMIN — SIMVASTATIN 10 MILLIGRAM(S): 20 TABLET, FILM COATED ORAL at 22:06

## 2020-10-02 RX ADMIN — Medication 2: at 12:50

## 2020-10-02 RX ADMIN — Medication 75 MICROGRAM(S): at 05:30

## 2020-10-02 RX ADMIN — Medication 81 MILLIGRAM(S): at 13:38

## 2020-10-02 RX ADMIN — SENNA PLUS 2 TABLET(S): 8.6 TABLET ORAL at 22:06

## 2020-10-02 NOTE — PROGRESS NOTE ADULT - ASSESSMENT
cath11/15/19 : Mild nonobstructive coronary artery disease. Normal LVEF  a/p    74yo female with PMH CAD, DM, HTN, HLD, hypothryoidism presenting s/p fall.    1. s/p fall; Near Syncope   -unclear if symptomatic secondary to conduction disease vs vertigo vs 2/2 to hypovolemia/decrease po intake  -on tele remains in  Sinus bradyarrhythmia -intermittently going into [place]enckebach   -not on AVN meds   -orthostatics hypotension + on 9/30 , s/p IVF   -repeat orthostatics Pending   -Pending Echo to eval lv fx   -TSH wnl  -EP eval noted.  now s/p ILR   -HS trops negative, no acs   -c/w meclizine   -MRI noted; neuro f/u     2.CAD   -cv stable, low dose ASA      cath11/15/19 : Mild nonobstructive coronary artery disease. Normal LVEF  a/p    76yo female with PMH CAD, DM, HTN, HLD, hypothryoidism presenting s/p fall.    1. s/p fall; Near Syncope   -unclear if symptomatic secondary to conduction disease vs vertigo vs 2/2 to hypovolemia/decrease po intake  -on tele remains in  Sinus bradyarrhythmia -intermittently going into Wenckebach   -not on AVN meds   -orthostatic hypotension + on 9/30 , s/p IVF   -repeat orthostatics Pending   -Pending Echo to eval lv fx   -TSH wnl  -EP eval noted.  now s/p ILR   -HS trops negative, no acs   -c/w meclizine   -MRI noted; neuro f/u     2.CAD   -cv stable, low dose ASA

## 2020-10-02 NOTE — CHART NOTE - NSCHARTNOTEFT_GEN_A_CORE
Denies dizziness.  s/p implantable loop recorder ( medtronic).  Post loop recorder implantation teaching provided to patient and her family. Follow up on 10/15 at 12:30 for wound check .  Oncology Building 4 th Floor at [facility]  [phone].

## 2020-10-02 NOTE — PROGRESS NOTE ADULT - SUBJECTIVE AND OBJECTIVE BOX
[facility]        Patient is a 75y old  Female who presents with a chief complaint of fall (02 Oct 2020 10:25)    Excerpt from H&P,"  74yo female with PMH DM, HTN, HLD, hypothryoidism presenting s/p fall. Patient reports she was ambulating today and felt lightheaded, fell forward landing on outstretch hands.  Notes lac to lateral left hand.  Denies pain elsewhere.  States lightheadedness occurs at random, but she notes it happens whenever "I get irritated".  Denies LOC or head trauma. Denies urinary or bowel incontinence. No associated chest pain or shortness of breath.  Denies ear pain or tinnitus. Had a fall last week 2/2 lightheadedness without injury.  Endorses gait instability, states she feels unbalanced especially during episodes of lightheadedness/dizziness. No headache, neck pain, weakness or numbness.  No back pain. Reports long history of lightheadedness/dizziness. Was started on meclizine with some improvement. Also endorses poor PO intake 2/2 anorexia.            *****PAST MEDICAL / Surgical  HISTORY:  PAST MEDICAL & SURGICAL HISTORY:  Type 2 diabetes mellitus  diet controlled    Hypothyroidism    Essential hypertension  HTN (hypertension)    Obesity    S/P hysterectomy    H/O ventral hernia repair  w/ mesh    S/P cholecystectomy    S/P Appendectomy             *****FAMILY HISTORY:  FAMILY HISTORY:  Family history of uterine cancer (Mother)             *****SOCIAL HISTORY:  Alcohol: None  Smoking: None         *****ALLERGIES:   Allergies    No Known Allergies    Intolerances             *****MEDICATIONS: current medication reviewed and documented.   MEDICATIONS  (STANDING):  aspirin enteric coated 81 milliGRAM(s) Oral daily  dextrose 5%. 1000 milliLiter(s) (50 mL/Hr) IV Continuous <Continuous>  dextrose 50% Injectable 12.5 Gram(s) IV Push once  dextrose 50% Injectable 25 Gram(s) IV Push once  dextrose 50% Injectable 25 Gram(s) IV Push once  enoxaparin Injectable 40 milliGRAM(s) SubCutaneous daily  influenza  Vaccine (HIGH DOSE) 0.7 milliLiter(s) IntraMuscular once  insulin lispro (HumaLOG) corrective regimen sliding scale   SubCutaneous three times a day before meals  insulin lispro (HumaLOG) corrective regimen sliding scale   SubCutaneous at bedtime  levothyroxine 75 MICROGram(s) Oral daily  losartan 100 milliGRAM(s) Oral daily  senna 2 Tablet(s) Oral at bedtime  simvastatin 10 milliGRAM(s) Oral at bedtime  sodium chloride 0.9%. 1000 milliLiter(s) (75 mL/Hr) IV Continuous <Continuous>    MEDICATIONS  (PRN):  acetaminophen   Tablet .. 650 milliGRAM(s) Oral every 6 hours PRN Mild Pain (1 - 3), Moderate Pain (4 - 6)  dextrose 40% Gel 15 Gram(s) Oral once PRN Blood Glucose LESS THAN 70 milliGRAM(s)/deciliter  glucagon  Injectable 1 milliGRAM(s) IntraMuscular once PRN Glucose LESS THAN 70 milligrams/deciliter  meclizine 25 milliGRAM(s) Oral two times a day PRN Dizziness  meclizine 12.5 milliGRAM(s) Oral two times a day PRN Dizziness           *****REVIEW OF SYSTEM:  GEN: no fever, no chills, no pain  RESP: no SOB, no cough, no sputum  CVS: no chest pain, no palpitations, no edema  GI: no abdominal pain, no nausea, no vomiting, no constipation, no diarrhea  : no dysurea, no frequency, no hematurea  Neuro: no headache, no dizziness  PSYCH: no anxiety, no depression  Derm : no itching, no rash         *****VITAL SIGNS:  T(C): 36.7 (10-02-20 @ 15:39), Max: 36.7 (10-02-20 @ 15:39)  HR: 56 (10-02-20 @ 15:39) (50 - 62)  BP: 136/41 (10-02-20 @ 15:39) (133/61 - 155/73)  RR: 17 (10-02-20 @ 15:39) (17 - 18)  SpO2: 99% (10-02-20 @ 15:39) (99% - 100%)  Wt(kg): --           *****PHYSICAL EXAM:   Alert oriented x 3   Attention comprehension are fair. Able to name, repeat, read without any difficulty.   Able to follow 3 step commands.     EOMI fundi not visualized,  VFF to confrontration  No facial asymmetry   Tongue is midline   Palate elevates symmetrically   Moving all 4 ext symmetrically no pronator drift   Reflexes are symmetric throughout   sensation is grossly symmetric  Gait : not assessed.  B/L down going toes               *****LAB AND IMAGIN.2   4.34  )-----------( 173      ( 02 Oct 2020 07:20 )             33.9               10-    140  |  105  |  23  ----------------------------<  128<H>  4.0   |  23  |  0.92    Ca    9.5      02 Oct 2020 07:20  Phos  3.8     10-02  Mg     1.8     10-02                                  [All pertinent recent Imaging reports reviewed]         *****A S S E S S M E N T   A N D   P L A N :   74yo female with PMH DM, HTN, HLD, hypothryoidism presenting s/p fall. Patient reports she was ambulating today and felt lightheaded, fell forward landing on outstretch hands.  Notes lac to lateral left hand.  Denies pain elsewhere.  States lightheadedness occurs at random, but she notes it happens whenever "I get irritated".  Denies LOC or head trauma. Denies urinary or bowel incontinence. No associated chest pain or shortness of breath.  Denies ear pain or tinnitus. Had a fall last week 2/2 lightheadedness without injury.  Endorses gait instability, states she feels unbalanced especially during episodes of lightheadedness/dizziness. No headache, neck pain, weakness or numbness.  No back pain. Reports long history of lightheadedness/dizziness. Was started on meclizine with some improvement. Also endorses poor PO intake 2/2 anorexia.     Problem/Recommendations 1:fall of unclear etiology   + orthostatic   liberalize bp goals  compression stockings  carotid u/s   mri brain with and without c/w microvascular disease     continue asa/statin for secondary prophylaxis           ___________________________  Will follow with you.  Thank you,  Viv Hughes MD  Diplomate of the American Board of Neurology and Psychiatry.  Diplomate of the American Board of Vascular Neurology.   [facility] ([facility]   Ph: [phone]    Differential diagnosis and plan of care discussed with patient after the evaluation.   Advanced care planning options discussed.   Pain assessed and judicious use of narcotics when appropriate was discussed.  Importance of Fall prevention discussed.  Counseling on Smoking and Alcohol cessation was offered when appropriate.  Counseling on Diet, exercise, and medication compliance was done.   83 minutes spent on the total encounter;  more than 50 % of the visit was spent on counseling  and or coordinating care by the attending physician.    Thank you for allowing me to participate in the care of this hema patient. Please do not hesitate to call me if you have any questions.     This and subsequent notes were partially created using voice recognition software and will  inherently be subject to errors including those of syntax and sound alike substitutions which may escape proofreading. In such instances original meaning may be extrapolated by contextual derivation.

## 2020-10-02 NOTE — CHART NOTE - NSCHARTNOTEFT_GEN_A_CORE
[employer] COVERAGE.    Late Entry.     Pt went for ILR placement today. Pt denies SOB, CP, swelling, edema, paresthesia distal to site or pain at site. Site checked. No ecchymosis, hematoma, or swelling near ILR placement. Active and passive ROM of LUE and Left shoulder. Motor and Sensory intact. 2+ peripheral pulses intact. Capillary refill less than 2 seconds. Pt educated to look out for bruising, swelling, tingling, and numbness of site/distal to site and notify RN. Will continue to monitor overnight.     Vital Signs Last 24 Hrs  T(C): 36.5 (01 Oct 2020 21:30), Max: 36.6 (01 Oct 2020 05:03)  T(F): 97.7 (01 Oct 2020 21:30), Max: 97.9 (01 Oct 2020 17:07)  HR: 60 (01 Oct 2020 21:30) (55 - 62)  BP: 155/73 (01 Oct 2020 21:30) (145/60 - 155/73)  RR: 18 (01 Oct 2020 21:30) (18 - 18)  SpO2: 100% (01 Oct 2020 21:30) (100% - 100%)    Paloma Adkins PA  Medicine [id number]

## 2020-10-02 NOTE — PROGRESS NOTE ADULT - SUBJECTIVE AND OBJECTIVE BOX
CARDIOLOGY FOLLOW UP - Dr. Salgado    CC no cp or sob   dizziness improving  s/p ILR placement yesterday       PHYSICAL EXAM:  T(C): 36.4 (10-02-20 @ 05:30), Max: 36.6 (10-01-20 @ 17:07)  HR: 50 (10-02-20 @ 05:30) (50 - 62)  BP: 142/57 (10-02-20 @ 05:30) (142/57 - 155/73)  RR: 18 (10-02-20 @ 05:30) (18 - 18)  SpO2: 99% (10-02-20 @ 05:30) (99% - 100%)  Wt(kg): --  I&O's Summary      Appearance: Normal	  Cardiovascular: Normal S1 S2,RRR, No JVD, No murmurs  Respiratory: Lungs clear to auscultation	  Gastrointestinal:  Soft, Non-tender, + BS	  Extremities: Normal range of motion, No clubbing, cyanosis or edema        MEDICATIONS  (STANDING):  aspirin enteric coated 81 milliGRAM(s) Oral daily  dextrose 5%. 1000 milliLiter(s) (50 mL/Hr) IV Continuous <Continuous>  dextrose 50% Injectable 12.5 Gram(s) IV Push once  dextrose 50% Injectable 25 Gram(s) IV Push once  dextrose 50% Injectable 25 Gram(s) IV Push once  enoxaparin Injectable 40 milliGRAM(s) SubCutaneous daily  influenza   Vaccine 0.5 milliLiter(s) IntraMuscular once  insulin lispro (HumaLOG) corrective regimen sliding scale   SubCutaneous three times a day before meals  insulin lispro (HumaLOG) corrective regimen sliding scale   SubCutaneous at bedtime  levothyroxine 75 MICROGram(s) Oral daily  losartan 100 milliGRAM(s) Oral daily  senna 2 Tablet(s) Oral at bedtime  simvastatin 10 milliGRAM(s) Oral at bedtime  sodium chloride 0.9%. 1000 milliLiter(s) (75 mL/Hr) IV Continuous <Continuous>      TELEMETRY: SB hr 45-50, Wenckebach  	    ECG:  	  RADIOLOGY:   DIAGNOSTIC TESTING:  [ ] Echocardiogram:  [ ]  Catheterization:  [ ] Stress Test:    OTHER: 	    LABS:	 	    Troponin T, High Sensitivity: 11 ng/L [<6 - 14] (09-29 @ 16:30)  Troponin T, High Sensitivity: 11 ng/L [<6 - 14] (09-29 @ 13:55)                          11.2   4.34  )-----------( 173      ( 02 Oct 2020 07:20 )             33.9     10-02    140  |  105  |  23  ----------------------------<  128<H>  4.0   |  23  |  0.92    Ca    9.5      02 Oct 2020 07:20  Phos  3.8     10-02  Mg     1.8     10-02

## 2020-10-02 NOTE — PROGRESS NOTE ADULT - ASSESSMENT
Problem/Plan - 1:  ·  Problem: Dizziness.  Plan: -Pt reports unprovoked dizziness. No associated ear pain or tinnitus   -w/ nystagmus on exam as well as dysmetria. CT head neg, will check MRI to further assess cerebellum  - cards and neuro fu  - ILR today  - EP fu     Problem/Plan - 2:  ·  Problem: Fall, initial encounter.  Plan: -likely 2/2 to dysequilibrium, no associated syncope. Pt denies LOC  -workup as above  -PT evaluation  -s/p lac repair.     Problem/Plan - 3:  ·  Problem: Type 2 diabetes mellitus.  Plan: -uncontrolled  -holding oral hypoglycemics   -c/w ISS      Problem/Plan - 4:·  Problem: Hypothyroidism.  Plan: -c/w levothyroxine Problem/Plan - 5:  ·  Problem: Essential hypertension.  Plan: -c/w losartan.     Problem/Plan - 6:  Problem: Need for prophylactic measure. Plan: DVT ppx: lovenox  PT eval.

## 2020-10-02 NOTE — PROGRESS NOTE ADULT - ATTENDING COMMENTS
Agree with above NP note.  cv stable  s/p ilr  echo with nl valve and lv function  dizziness improved  cont current tx  med/eps f/u

## 2020-10-02 NOTE — PROGRESS NOTE ADULT - SUBJECTIVE AND OBJECTIVE BOX
Patient is a 75y old  Female who presents with a chief complaint of fall (02 Oct 2020 11:02)      INTERVAL HPI/OVERNIGHT EVENTS:  T(C): 36.7 (10-02-20 @ 15:39), Max: 36.7 (10-02-20 @ 15:39)  HR: 56 (10-02-20 @ 15:39) (50 - 62)  BP: 136/41 (10-02-20 @ 15:39) (133/61 - 155/73)  RR: 17 (10-02-20 @ 15:39) (17 - 18)  SpO2: 99% (10-02-20 @ 15:39) (99% - 100%)  Wt(kg): --  I&O's Summary      LABS:                        11.2   4.34  )-----------( 173      ( 02 Oct 2020 07:20 )             33.9     10-02    140  |  105  |  23  ----------------------------<  128<H>  4.0   |  23  |  0.92    Ca    9.5      02 Oct 2020 07:20  Phos  3.8     10-02  Mg     1.8     10-02          CAPILLARY BLOOD GLUCOSE      POCT Blood Glucose.: 216 mg/dL (02 Oct 2020 12:19)  POCT Blood Glucose.: 123 mg/dL (02 Oct 2020 07:40)  POCT Blood Glucose.: 189 mg/dL (01 Oct 2020 21:25)  POCT Blood Glucose.: 123 mg/dL (01 Oct 2020 17:03)            MEDICATIONS  (STANDING):  aspirin enteric coated 81 milliGRAM(s) Oral daily  dextrose 5%. 1000 milliLiter(s) (50 mL/Hr) IV Continuous <Continuous>  dextrose 50% Injectable 12.5 Gram(s) IV Push once  dextrose 50% Injectable 25 Gram(s) IV Push once  dextrose 50% Injectable 25 Gram(s) IV Push once  enoxaparin Injectable 40 milliGRAM(s) SubCutaneous daily  influenza  Vaccine (HIGH DOSE) 0.7 milliLiter(s) IntraMuscular once  insulin lispro (HumaLOG) corrective regimen sliding scale   SubCutaneous three times a day before meals  insulin lispro (HumaLOG) corrective regimen sliding scale   SubCutaneous at bedtime  levothyroxine 75 MICROGram(s) Oral daily  losartan 100 milliGRAM(s) Oral daily  senna 2 Tablet(s) Oral at bedtime  simvastatin 10 milliGRAM(s) Oral at bedtime  sodium chloride 0.9%. 1000 milliLiter(s) (75 mL/Hr) IV Continuous <Continuous>    MEDICATIONS  (PRN):  acetaminophen   Tablet .. 650 milliGRAM(s) Oral every 6 hours PRN Mild Pain (1 - 3), Moderate Pain (4 - 6)  dextrose 40% Gel 15 Gram(s) Oral once PRN Blood Glucose LESS THAN 70 milliGRAM(s)/deciliter  glucagon  Injectable 1 milliGRAM(s) IntraMuscular once PRN Glucose LESS THAN 70 milligrams/deciliter  meclizine 25 milliGRAM(s) Oral two times a day PRN Dizziness  meclizine 12.5 milliGRAM(s) Oral two times a day PRN Dizziness          PHYSICAL EXAM:  GENERAL: NAD, well-groomed, well-developed  HEAD:  Atraumatic, Normocephalic  CHEST/LUNG: Clear to percussion bilaterally; No rales, rhonchi, wheezing, or rubs  HEART: Regular rate and rhythm; No murmurs, rubs, or gallops  ABDOMEN: Soft, Nontender, Nondistended; Bowel sounds present  EXTREMITIES:  2+ Peripheral Pulses, No clubbing, cyanosis, or edema  LYMPH: No lymphadenopathy noted  SKIN: No rashes or lesions    Care Discussed with Consultants/Other Providers [ ] YES  [ ] NO

## 2020-10-03 LAB
ANION GAP SERPL CALC-SCNC: 13 MMO/L — SIGNIFICANT CHANGE UP (ref 7–14)
BUN SERPL-MCNC: 27 MG/DL — HIGH (ref 7–23)
CALCIUM SERPL-MCNC: 10 MG/DL — SIGNIFICANT CHANGE UP (ref 8.4–10.5)
CHLORIDE SERPL-SCNC: 105 MMOL/L — SIGNIFICANT CHANGE UP (ref 98–107)
CO2 SERPL-SCNC: 21 MMOL/L — LOW (ref 22–31)
CREAT SERPL-MCNC: 0.91 MG/DL — SIGNIFICANT CHANGE UP (ref 0.5–1.3)
GLUCOSE BLDC GLUCOMTR-MCNC: 114 MG/DL — HIGH (ref 70–99)
GLUCOSE BLDC GLUCOMTR-MCNC: 129 MG/DL — HIGH (ref 70–99)
GLUCOSE BLDC GLUCOMTR-MCNC: 133 MG/DL — HIGH (ref 70–99)
GLUCOSE BLDC GLUCOMTR-MCNC: 217 MG/DL — HIGH (ref 70–99)
GLUCOSE SERPL-MCNC: 130 MG/DL — HIGH (ref 70–99)
HCT VFR BLD CALC: 36.2 % — SIGNIFICANT CHANGE UP (ref 34.5–45)
HGB BLD-MCNC: 12.6 G/DL — SIGNIFICANT CHANGE UP (ref 11.5–15.5)
MAGNESIUM SERPL-MCNC: 1.8 MG/DL — SIGNIFICANT CHANGE UP (ref 1.6–2.6)
MCHC RBC-ENTMCNC: 31.5 PG — SIGNIFICANT CHANGE UP (ref 27–34)
MCHC RBC-ENTMCNC: 34.8 % — SIGNIFICANT CHANGE UP (ref 32–36)
MCV RBC AUTO: 90.5 FL — SIGNIFICANT CHANGE UP (ref 80–100)
NRBC # FLD: 0 K/UL — SIGNIFICANT CHANGE UP (ref 0–0)
PHOSPHATE SERPL-MCNC: 3.4 MG/DL — SIGNIFICANT CHANGE UP (ref 2.5–4.5)
PLATELET # BLD AUTO: 182 K/UL — SIGNIFICANT CHANGE UP (ref 150–400)
PMV BLD: 9.6 FL — SIGNIFICANT CHANGE UP (ref 7–13)
POTASSIUM SERPL-MCNC: 4.5 MMOL/L — SIGNIFICANT CHANGE UP (ref 3.5–5.3)
POTASSIUM SERPL-SCNC: 4.5 MMOL/L — SIGNIFICANT CHANGE UP (ref 3.5–5.3)
RBC # BLD: 4 M/UL — SIGNIFICANT CHANGE UP (ref 3.8–5.2)
RBC # FLD: 12.2 % — SIGNIFICANT CHANGE UP (ref 10.3–14.5)
SODIUM SERPL-SCNC: 139 MMOL/L — SIGNIFICANT CHANGE UP (ref 135–145)
WBC # BLD: 4.42 K/UL — SIGNIFICANT CHANGE UP (ref 3.8–10.5)
WBC # FLD AUTO: 4.42 K/UL — SIGNIFICANT CHANGE UP (ref 3.8–10.5)

## 2020-10-03 RX ADMIN — Medication 81 MILLIGRAM(S): at 12:10

## 2020-10-03 RX ADMIN — Medication 2: at 17:04

## 2020-10-03 RX ADMIN — SENNA PLUS 2 TABLET(S): 8.6 TABLET ORAL at 22:01

## 2020-10-03 RX ADMIN — Medication 75 MICROGRAM(S): at 05:59

## 2020-10-03 RX ADMIN — SIMVASTATIN 10 MILLIGRAM(S): 20 TABLET, FILM COATED ORAL at 22:00

## 2020-10-03 RX ADMIN — ENOXAPARIN SODIUM 40 MILLIGRAM(S): 100 INJECTION SUBCUTANEOUS at 12:11

## 2020-10-03 NOTE — PROGRESS NOTE ADULT - ASSESSMENT
cath11/15/19 : Mild nonobstructive coronary artery disease. Normal LVEF  a/p    74yo female with PMH CAD, DM, HTN, HLD, hypothryoidism presenting s/p fall.    1. s/p fall; Near Syncope   -unclear if symptomatic secondary to conduction disease vs vertigo vs 2/2 to hypovolemia/decrease po intake  -on tele remains in  Sinus bradyarrhythmia -intermittently going into Wenckebach   -not on AVN meds   -orthostatic hypotension + on 9/30 , 10/2  s/p IVF   -echo w/ normal lVEF, no sig valve disease, mild diastolic dyfunction   -TSH wnl  -EP eval noted.  now s/p ILR   -HS trops negative, no acs   -c/w meclizine   -MRI noted; neuro f/u     2.CAD   -cv stable, low dose ASA

## 2020-10-03 NOTE — PROGRESS NOTE ADULT - ASSESSMENT
Problem/Plan - 1:  ·  Problem: Dizziness.  Plan: -Pt reports unprovoked dizziness. No associated ear pain or tinnitus   -w/ nystagmus on exam as well as dysmetria. CT head neg, MRI negative.   - s/p ILR   -ep FOLLOWING   Problem/Plan - 2:  ·  Problem: Fall, initial encounter.  Plan: -likely 2/2 to dysequilibrium, no associated syncope. Pt denies LOC  -workup as above  -PT evaluation  -s/p lac repair.     Problem/Plan - 3:  ·  Problem: Type 2 diabetes mellitus.  Plan: -uncontrolled  -holding oral hypoglycemics   -c/w ISS      Problem/Plan - 4:·  Problem: Hypothyroidism.  Plan: -c/w levothyroxine Problem/Plan - 5:  ·  Problem: Essential hypertension.  Plan: -c/w losartan.     Problem/Plan - 6:  Problem: Need for prophylactic measure. Plan: DVT ppx: lovenox  PT eval.

## 2020-10-03 NOTE — PROGRESS NOTE ADULT - SUBJECTIVE AND OBJECTIVE BOX
CARDIOLOGY FOLLOW UP - Dr. Salgado    CC no cp/sob   no acute complaints       PHYSICAL EXAM:  T(C): 36.7 (10-03-20 @ 05:56), Max: 36.7 (10-02-20 @ 15:39)  HR: 56 (10-03-20 @ 05:56) (51 - 59)  BP: 127/55 (10-03-20 @ 05:56) (127/55 - 136/41)  RR: 18 (10-03-20 @ 05:56) (17 - 18)  SpO2: 100% (10-03-20 @ 05:56) (99% - 100%)  Wt(kg): --  I&O's Summary      Appearance: Normal	  Cardiovascular: Normal S1 S2,RRR, No JVD, No murmurs  Respiratory: Lungs clear to auscultation	  Gastrointestinal:  Soft, Non-tender, + BS	  Extremities: Normal range of motion, No clubbing, cyanosis or edema        MEDICATIONS  (STANDING):  aspirin enteric coated 81 milliGRAM(s) Oral daily  dextrose 5%. 1000 milliLiter(s) (50 mL/Hr) IV Continuous <Continuous>  dextrose 50% Injectable 12.5 Gram(s) IV Push once  dextrose 50% Injectable 25 Gram(s) IV Push once  dextrose 50% Injectable 25 Gram(s) IV Push once  enoxaparin Injectable 40 milliGRAM(s) SubCutaneous daily  influenza  Vaccine (HIGH DOSE) 0.7 milliLiter(s) IntraMuscular once  insulin lispro (HumaLOG) corrective regimen sliding scale   SubCutaneous three times a day before meals  insulin lispro (HumaLOG) corrective regimen sliding scale   SubCutaneous at bedtime  levothyroxine 75 MICROGram(s) Oral daily  losartan 100 milliGRAM(s) Oral daily  senna 2 Tablet(s) Oral at bedtime  simvastatin 10 milliGRAM(s) Oral at bedtime  sodium chloride 0.9%. 1000 milliLiter(s) (75 mL/Hr) IV Continuous <Continuous>      TELEMETRY: SB 50s   ECG:  	  RADIOLOGY:   DIAGNOSTIC TESTING:  [ ] Echocardiogram:  [ ]  Catheterization:  [ ] Stress Test:    OTHER: 	    LABS:	 	                                12.6   4.42  )-----------( 182      ( 03 Oct 2020 03:30 )             36.2     10-03    139  |  105  |  27<H>  ----------------------------<  130<H>  4.5   |  21<L>  |  0.91    Ca    10.0      03 Oct 2020 03:30  Phos  3.4     10-03  Mg     1.8     10-03

## 2020-10-03 NOTE — PROGRESS NOTE ADULT - SUBJECTIVE AND OBJECTIVE BOX
Patient is a 75y old  Female who presents with a chief complaint of fall (02 Oct 2020 11:02)      INTERVAL HPI/OVERNIGHT EVENTS:  T(C): 36.7 (10-02-20 @ 15:39), Max: 36.7 (10-02-20 @ 15:39)  HR: 56 (10-02-20 @ 15:39) (50 - 62)  BP: 136/41 (10-02-20 @ 15:39) (133/61 - 155/73)  RR: 17 (10-02-20 @ 15:39) (17 - 18)  SpO2: 99% (10-02-20 @ 15:39) (99% - 100%)  Wt(kg): --  I&O's Summary      T(C): 36.7 (10-03-20 @ 14:00), Max: 36.7 (10-03-20 @ 14:00)  HR: 55 (10-03-20 @ 14:00) (55 - 55)  BP: 119/97 (10-03-20 @ 14:00) (119/97 - 119/97)  RR: 18 (10-03-20 @ 14:00) (18 - 18)  SpO2: 98% (10-03-20 @ 14:00) (98% - 98%)      CAPILLARY BLOOD GLUCOSE      POCT Blood Glucose.: 217 mg/dL (03 Oct 2020 16:50)  POCT Blood Glucose.: 133 mg/dL (03 Oct 2020 11:28)  POCT Blood Glucose.: 129 mg/dL (03 Oct 2020 08:06)  POCT Blood Glucose.: 150 mg/dL (02 Oct 2020 21:19)      MEDICATIONS  (STANDING):  aspirin enteric coated 81 milliGRAM(s) Oral daily  dextrose 5%. 1000 milliLiter(s) (50 mL/Hr) IV Continuous <Continuous>  dextrose 50% Injectable 12.5 Gram(s) IV Push once  dextrose 50% Injectable 25 Gram(s) IV Push once  dextrose 50% Injectable 25 Gram(s) IV Push once  enoxaparin Injectable 40 milliGRAM(s) SubCutaneous daily  influenza  Vaccine (HIGH DOSE) 0.7 milliLiter(s) IntraMuscular once  insulin lispro (HumaLOG) corrective regimen sliding scale   SubCutaneous three times a day before meals  insulin lispro (HumaLOG) corrective regimen sliding scale   SubCutaneous at bedtime  levothyroxine 75 MICROGram(s) Oral daily  losartan 100 milliGRAM(s) Oral daily  senna 2 Tablet(s) Oral at bedtime  simvastatin 10 milliGRAM(s) Oral at bedtime  sodium chloride 0.9%. 1000 milliLiter(s) (75 mL/Hr) IV Continuous <Continuous>    MEDICATIONS  (PRN):  acetaminophen   Tablet .. 650 milliGRAM(s) Oral every 6 hours PRN Mild Pain (1 - 3), Moderate Pain (4 - 6)  dextrose 40% Gel 15 Gram(s) Oral once PRN Blood Glucose LESS THAN 70 milliGRAM(s)/deciliter  glucagon  Injectable 1 milliGRAM(s) IntraMuscular once PRN Glucose LESS THAN 70 milligrams/deciliter  meclizine 25 milliGRAM(s) Oral two times a day PRN Dizziness  meclizine 12.5 milliGRAM(s) Oral two times a day PRN Dizziness          PHYSICAL EXAM:  GENERAL: NAD, well-groomed, well-developed  HEAD:  Atraumatic, Normocephalic  CHEST/LUNG: Clear to percussion bilaterally; No rales, rhonchi, wheezing, or rubs  HEART: Regular rate and rhythm; No murmurs, rubs, or gallops  ABDOMEN: Soft, Nontender, Nondistended; Bowel sounds present  EXTREMITIES:  2+ Peripheral Pulses, No clubbing, cyanosis, or edema  LYMPH: No lymphadenopathy noted  SKIN: No rashes or lesions    Care Discussed with Consultants/Other Providers [ ] YES  [ ] NO

## 2020-10-04 ENCOUNTER — TRANSCRIPTION ENCOUNTER (OUTPATIENT)
Age: 75
End: 2020-10-04

## 2020-10-04 VITALS
DIASTOLIC BLOOD PRESSURE: 79 MMHG | RESPIRATION RATE: 18 BRPM | HEART RATE: 59 BPM | TEMPERATURE: 98 F | OXYGEN SATURATION: 100 % | SYSTOLIC BLOOD PRESSURE: 141 MMHG

## 2020-10-04 LAB
ANION GAP SERPL CALC-SCNC: 12 MMO/L — SIGNIFICANT CHANGE UP (ref 7–14)
BUN SERPL-MCNC: 27 MG/DL — HIGH (ref 7–23)
CALCIUM SERPL-MCNC: 9.8 MG/DL — SIGNIFICANT CHANGE UP (ref 8.4–10.5)
CHLORIDE SERPL-SCNC: 102 MMOL/L — SIGNIFICANT CHANGE UP (ref 98–107)
CO2 SERPL-SCNC: 21 MMOL/L — LOW (ref 22–31)
CREAT SERPL-MCNC: 0.86 MG/DL — SIGNIFICANT CHANGE UP (ref 0.5–1.3)
GLUCOSE BLDC GLUCOMTR-MCNC: 111 MG/DL — HIGH (ref 70–99)
GLUCOSE BLDC GLUCOMTR-MCNC: 121 MG/DL — HIGH (ref 70–99)
GLUCOSE BLDC GLUCOMTR-MCNC: 148 MG/DL — HIGH (ref 70–99)
GLUCOSE SERPL-MCNC: 118 MG/DL — HIGH (ref 70–99)
HBA1C BLD-MCNC: 6.2 % — HIGH (ref 4–5.6)
HCT VFR BLD CALC: 36 % — SIGNIFICANT CHANGE UP (ref 34.5–45)
HGB BLD-MCNC: 11.9 G/DL — SIGNIFICANT CHANGE UP (ref 11.5–15.5)
MAGNESIUM SERPL-MCNC: 1.9 MG/DL — SIGNIFICANT CHANGE UP (ref 1.6–2.6)
MCHC RBC-ENTMCNC: 31.2 PG — SIGNIFICANT CHANGE UP (ref 27–34)
MCHC RBC-ENTMCNC: 33.1 % — SIGNIFICANT CHANGE UP (ref 32–36)
MCV RBC AUTO: 94.5 FL — SIGNIFICANT CHANGE UP (ref 80–100)
NRBC # FLD: 0 K/UL — SIGNIFICANT CHANGE UP (ref 0–0)
PHOSPHATE SERPL-MCNC: 3.8 MG/DL — SIGNIFICANT CHANGE UP (ref 2.5–4.5)
PLATELET # BLD AUTO: 195 K/UL — SIGNIFICANT CHANGE UP (ref 150–400)
PMV BLD: 10.3 FL — SIGNIFICANT CHANGE UP (ref 7–13)
POTASSIUM SERPL-MCNC: 4.1 MMOL/L — SIGNIFICANT CHANGE UP (ref 3.5–5.3)
POTASSIUM SERPL-SCNC: 4.1 MMOL/L — SIGNIFICANT CHANGE UP (ref 3.5–5.3)
RBC # BLD: 3.81 M/UL — SIGNIFICANT CHANGE UP (ref 3.8–5.2)
RBC # FLD: 12.4 % — SIGNIFICANT CHANGE UP (ref 10.3–14.5)
SODIUM SERPL-SCNC: 135 MMOL/L — SIGNIFICANT CHANGE UP (ref 135–145)
WBC # BLD: 4.53 K/UL — SIGNIFICANT CHANGE UP (ref 3.8–10.5)
WBC # FLD AUTO: 4.53 K/UL — SIGNIFICANT CHANGE UP (ref 3.8–10.5)

## 2020-10-04 RX ORDER — ACETAMINOPHEN 500 MG
2 TABLET ORAL
Qty: 0 | Refills: 0 | DISCHARGE
Start: 2020-10-04

## 2020-10-04 RX ORDER — MELOXICAM 15 MG/1
1 TABLET ORAL
Qty: 0 | Refills: 0 | DISCHARGE

## 2020-10-04 RX ORDER — BACITRACIN ZINC 500 UNIT/G
1 OINTMENT IN PACKET (EA) TOPICAL
Qty: 0 | Refills: 0 | DISCHARGE
Start: 2020-10-04

## 2020-10-04 RX ORDER — MECLIZINE HCL 12.5 MG
1 TABLET ORAL
Qty: 0 | Refills: 0 | DISCHARGE

## 2020-10-04 RX ORDER — BACITRACIN ZINC 500 UNIT/G
1 OINTMENT IN PACKET (EA) TOPICAL DAILY
Refills: 0 | Status: DISCONTINUED | OUTPATIENT
Start: 2020-10-04 | End: 2020-10-04

## 2020-10-04 RX ORDER — MECLIZINE HCL 12.5 MG
1 TABLET ORAL
Qty: 60 | Refills: 0
Start: 2020-10-04 | End: 2020-11-02

## 2020-10-04 RX ORDER — ASPIRIN/CALCIUM CARB/MAGNESIUM 324 MG
1 TABLET ORAL
Qty: 0 | Refills: 0 | DISCHARGE
Start: 2020-10-04

## 2020-10-04 RX ADMIN — Medication 1 APPLICATION(S): at 17:21

## 2020-10-04 RX ADMIN — ENOXAPARIN SODIUM 40 MILLIGRAM(S): 100 INJECTION SUBCUTANEOUS at 12:57

## 2020-10-04 RX ADMIN — Medication 75 MICROGRAM(S): at 05:29

## 2020-10-04 RX ADMIN — Medication 650 MILLIGRAM(S): at 05:28

## 2020-10-04 RX ADMIN — Medication 650 MILLIGRAM(S): at 02:25

## 2020-10-04 RX ADMIN — Medication 81 MILLIGRAM(S): at 12:57

## 2020-10-04 NOTE — PROGRESS NOTE ADULT - ATTENDING COMMENTS
Agree with above NP note.  cv stable  s/p ilr  echo with nl valve and lv function  events noted  cont current tx  ask EP for followup

## 2020-10-04 NOTE — PROGRESS NOTE ADULT - SUBJECTIVE AND OBJECTIVE BOX
Patient is a 75y old  Female who presents with a chief complaint of fall (02 Oct 2020 11:02)      INTERVAL HPI/OVERNIGHT EVENTS: no events over night.       MEDICATIONS  (STANDING):  aspirin enteric coated 81 milliGRAM(s) Oral daily  BACItracin   Ointment 1 Application(s) Topical daily  dextrose 5%. 1000 milliLiter(s) (50 mL/Hr) IV Continuous <Continuous>  dextrose 50% Injectable 12.5 Gram(s) IV Push once  dextrose 50% Injectable 25 Gram(s) IV Push once  dextrose 50% Injectable 25 Gram(s) IV Push once  enoxaparin Injectable 40 milliGRAM(s) SubCutaneous daily  insulin lispro (HumaLOG) corrective regimen sliding scale   SubCutaneous three times a day before meals  insulin lispro (HumaLOG) corrective regimen sliding scale   SubCutaneous at bedtime  levothyroxine 75 MICROGram(s) Oral daily  losartan 100 milliGRAM(s) Oral daily  senna 2 Tablet(s) Oral at bedtime  simvastatin 10 milliGRAM(s) Oral at bedtime  sodium chloride 0.9%. 1000 milliLiter(s) (75 mL/Hr) IV Continuous <Continuous>    MEDICATIONS  (PRN):  acetaminophen   Tablet .. 650 milliGRAM(s) Oral every 6 hours PRN Mild Pain (1 - 3), Moderate Pain (4 - 6)  dextrose 40% Gel 15 Gram(s) Oral once PRN Blood Glucose LESS THAN 70 milliGRAM(s)/deciliter  glucagon  Injectable 1 milliGRAM(s) IntraMuscular once PRN Glucose LESS THAN 70 milligrams/deciliter  meclizine 25 milliGRAM(s) Oral two times a day PRN Dizziness  meclizine 12.5 milliGRAM(s) Oral two times a day PRN Dizziness    CAPILLARY BLOOD GLUCOSE      POCT Blood Glucose.: 148 mg/dL (04 Oct 2020 17:05)  POCT Blood Glucose.: 111 mg/dL (04 Oct 2020 12:00)  POCT Blood Glucose.: 121 mg/dL (04 Oct 2020 07:36)  POCT Blood Glucose.: 114 mg/dL (03 Oct 2020 21:41)      PHYSICAL EXAM:  GENERAL: NAD, well-groomed, well-developed  HEAD:  Atraumatic, Normocephalic  CHEST/LUNG: Clear to percussion bilaterally; No rales, rhonchi, wheezing, or rubs  HEART: Regular rate and rhythm; No murmurs, rubs, or gallops  ABDOMEN: Soft, Nontender, Nondistended; Bowel sounds present  EXTREMITIES:  2+ Peripheral Pulses, No clubbing, cyanosis, or edema  LYMPH: No lymphadenopathy noted  SKIN: No rashes or lesions                            11.9   4.53  )-----------( 195      ( 04 Oct 2020 06:36 )             36.0               135|102|27<118  4.1|21|0.86  9.8,1.9,3.8  10-04 @ 06:36    Care Discussed with Consultants/Other Providers [ ] YES  [ ] NO

## 2020-10-04 NOTE — PROGRESS NOTE ADULT - SUBJECTIVE AND OBJECTIVE BOX
CARDIOLOGY FOLLOW UP - Dr. Salgado    CC no cp/sob   tele events noted  SB dropped to 30s with pauses noted   pt. asymptomatic, no dizziness, syncope, loc       PHYSICAL EXAM:  T(C): 36.7 (10-04-20 @ 05:26), Max: 36.8 (10-03-20 @ 20:20)  HR: 55 (10-04-20 @ 05:26) (51 - 58)  BP: 118/57 (10-04-20 @ 05:26) (118/57 - 160/63)  RR: 18 (10-04-20 @ 05:26) (17 - 18)  SpO2: 100% (10-04-20 @ 05:26) (98% - 100%)  Wt(kg): --  I&O's Summary      Appearance: Normal	  Cardiovascular: Normal S1 S2,RRR, No JVD, No murmurs  Respiratory: Lungs clear to auscultation	  Gastrointestinal:  Soft, Non-tender, + BS	  Extremities: Normal range of motion, No clubbing, cyanosis or edema        MEDICATIONS  (STANDING):  aspirin enteric coated 81 milliGRAM(s) Oral daily  dextrose 5%. 1000 milliLiter(s) (50 mL/Hr) IV Continuous <Continuous>  dextrose 50% Injectable 12.5 Gram(s) IV Push once  dextrose 50% Injectable 25 Gram(s) IV Push once  dextrose 50% Injectable 25 Gram(s) IV Push once  enoxaparin Injectable 40 milliGRAM(s) SubCutaneous daily  influenza  Vaccine (HIGH DOSE) 0.7 milliLiter(s) IntraMuscular once  insulin lispro (HumaLOG) corrective regimen sliding scale   SubCutaneous three times a day before meals  insulin lispro (HumaLOG) corrective regimen sliding scale   SubCutaneous at bedtime  levothyroxine 75 MICROGram(s) Oral daily  losartan 100 milliGRAM(s) Oral daily  senna 2 Tablet(s) Oral at bedtime  simvastatin 10 milliGRAM(s) Oral at bedtime  sodium chloride 0.9%. 1000 milliLiter(s) (75 mL/Hr) IV Continuous <Continuous>      TELEMETRY:  SB 30-50s	    ECG:  	  RADIOLOGY:   DIAGNOSTIC TESTING:  [ ] Echocardiogram:  [ ]  Catheterization:  [ ] Stress Test:    OTHER: 	    LABS:	 	                                11.9   4.53  )-----------( 195      ( 04 Oct 2020 06:36 )             36.0     10-04    135  |  102  |  27<H>  ----------------------------<  118<H>  4.1   |  21<L>  |  0.86    Ca    9.8      04 Oct 2020 06:36  Phos  3.8     10-04  Mg     1.9     10-04             CARDIOLOGY FOLLOW UP - Dr. Salgado    CC no cp/sob   events noted  tele reviewed, HR in 40s, no pauses noted   pt. asymptomatic, no dizziness, syncope, loc       PHYSICAL EXAM:  T(C): 36.7 (10-04-20 @ 05:26), Max: 36.8 (10-03-20 @ 20:20)  HR: 55 (10-04-20 @ 05:26) (51 - 58)  BP: 118/57 (10-04-20 @ 05:26) (118/57 - 160/63)  RR: 18 (10-04-20 @ 05:26) (17 - 18)  SpO2: 100% (10-04-20 @ 05:26) (98% - 100%)  Wt(kg): --  I&O's Summary      Appearance: Normal	  Cardiovascular: Normal S1 S2,RRR, No JVD, No murmurs  Respiratory: Lungs clear to auscultation	  Gastrointestinal:  Soft, Non-tender, + BS	  Extremities: Normal range of motion, No clubbing, cyanosis or edema        MEDICATIONS  (STANDING):  aspirin enteric coated 81 milliGRAM(s) Oral daily  dextrose 5%. 1000 milliLiter(s) (50 mL/Hr) IV Continuous <Continuous>  dextrose 50% Injectable 12.5 Gram(s) IV Push once  dextrose 50% Injectable 25 Gram(s) IV Push once  dextrose 50% Injectable 25 Gram(s) IV Push once  enoxaparin Injectable 40 milliGRAM(s) SubCutaneous daily  influenza  Vaccine (HIGH DOSE) 0.7 milliLiter(s) IntraMuscular once  insulin lispro (HumaLOG) corrective regimen sliding scale   SubCutaneous three times a day before meals  insulin lispro (HumaLOG) corrective regimen sliding scale   SubCutaneous at bedtime  levothyroxine 75 MICROGram(s) Oral daily  losartan 100 milliGRAM(s) Oral daily  senna 2 Tablet(s) Oral at bedtime  simvastatin 10 milliGRAM(s) Oral at bedtime  sodium chloride 0.9%. 1000 milliLiter(s) (75 mL/Hr) IV Continuous <Continuous>      TELEMETRY:  SB 40-50s	    ECG:  	  RADIOLOGY:   DIAGNOSTIC TESTING:  [ ] Echocardiogram:  [ ]  Catheterization:  [ ] Stress Test:    OTHER: 	    LABS:	 	                                11.9   4.53  )-----------( 195      ( 04 Oct 2020 06:36 )             36.0     10-04    135  |  102  |  27<H>  ----------------------------<  118<H>  4.1   |  21<L>  |  0.86    Ca    9.8      04 Oct 2020 06:36  Phos  3.8     10-04  Mg     1.9     10-04

## 2020-10-04 NOTE — PROGRESS NOTE ADULT - ASSESSMENT
Problem/Plan - 1:  ·  Problem: Dizziness.  Plan: -Pt reports unprovoked dizziness. No associated ear pain or tinnitus   -w/ nystagmus on exam as well as dysmetria. CT head neg, MRI negative.   - s/p ILR   -ep FOLLOWING   Problem/Plan - 2:  ·  Problem: Fall, initial encounter.  Plan: -likely 2/2 to dysequilibrium, no associated syncope. Pt denies LOC  -workup as above  -PT evaluation  -s/p lac repair.     Problem/Plan - 3:  ·  Problem: Type 2 diabetes mellitus.  Plan: -uncontrolled  -holding oral hypoglycemics   -c/w ISS      Problem/Plan - 4:·  Problem: Hypothyroidism.  Plan: -c/w levothyroxine Problem/Plan - 5:  ·  Problem: Essential hypertension.  Plan: -c/w losartan.     Problem/Plan - 6:  Problem: Need for prophylactic measure. Plan: DVT ppx: lovenox  d/c planning.

## 2020-10-04 NOTE — PROGRESS NOTE ADULT - ASSESSMENT
cath11/15/19 : Mild nonobstructive coronary artery disease. Normal LVEF  a/p    76yo female with PMH CAD, DM, HTN, HLD, hypothryoidism presenting s/p fall.    1. s/p fall; Near Syncope   -unclear if symptomatic secondary to conduction disease vs vertigo vs 2/2 to hypovolemia/decrease po intake  -on tele remains in Sinus bradyarrhythmia 30-50s -intermittently going into [place]enckebach   -not on AVN meds   -orthostatic hypotension + on 9/30 , 10/2  s/p IVF   -echo w/ normal lVEF, no sig valve disease, mild diastolic dyfunction   -TSH wnl  -EP eval noted.  now s/p ILR   -HS trops negative, no acs   -c/w meclizine   -MRI noted; neuro f/u     2.CAD   -cv stable, low dose ASA      cath11/15/19 : Mild nonobstructive coronary artery disease. Normal LVEF  a/p    76yo female with PMH CAD, DM, HTN, HLD, hypothryoidism presenting s/p fall.    1. s/p fall; Near Syncope   -unclear if symptomatic secondary to conduction disease vs vertigo vs 2/2 to hypovolemia/decrease po intake  -on tele, SB 40-50s, no further [place]enckebach noted.   -not on AVN meds   -orthostatic hypotension + on 9/30 , 10/2  s/p IVF   -echo w/ normal lVEF, no sig valve disease, mild diastolic dyfunction   -TSH wnl  -EP eval noted.  now s/p ILR   -HS trops negative, no acs   -c/w meclizine   -MRI noted; neuro f/u     2.CAD   -cv stable, low dose ASA      cath11/15/19 : Mild nonobstructive coronary artery disease. Normal LVEF  a/p    74yo female with PMH CAD, DM, HTN, HLD, hypothryoidism presenting s/p fall.    1. s/p fall; Near Syncope   -unclear if symptomatic secondary to conduction disease vs vertigo vs 2/2 to hypovolemia/decrease po intake  -on tele, SB 40-50s w/ intermittent Wenckebach, no pauses noted   -not on AVN meds   -orthostatic hypotension + on 9/30 , 10/2  s/p IVF   -echo w/ normal lVEF, no sig valve disease, mild diastolic dyfunction   -TSH wnl  -EP eval noted.  now s/p ILR   -HS trops negative, no acs   -c/w meclizine   -MRI noted; neuro f/u     2.CAD   -cv stable, low dose ASA

## 2020-10-04 NOTE — DISCHARGE NOTE PROVIDER - NSDCCPCAREPLAN_GEN_ALL_CORE_FT
PRINCIPAL DISCHARGE DIAGNOSIS  Diagnosis: Near syncope  Assessment and Plan of Treatment: Continue current medications  You had an internal loop recorder placed-please follow up with EP clinic as scheduled.  F/U with your PMD in 1 week      SECONDARY DISCHARGE DIAGNOSES  Diagnosis: Laceration of hand without complication, excluding fingers  Assessment and Plan of Treatment: Please go to your PMD or urgent care in 3 days to remove hand sutures  Apply antibiotic ointment daily to wound and cover with a sterile dry gauze.  Clean gently daily with soap and water.      Diagnosis: Type 2 diabetes mellitus  Assessment and Plan of Treatment: Monitor finger sticks pre-meal and bedtime, low salt, fat and carbohydrate diet, minimize glucose intake.  Exercise daily for at least 30 minutes and weight loss.  Follow up with primary care physician and endocrinologist for routine Hemoglobin A1C checks and management.  Follow up with your ophthalmologist for routine yearly vision exams.    Diagnosis: Essential hypertension  Assessment and Plan of Treatment: Low sodium and fat diet, continue anti-hypertensive medications, and follow up with primary care physician.    Diagnosis: Fall, initial encounter  Assessment and Plan of Treatment:

## 2020-10-04 NOTE — DISCHARGE NOTE PROVIDER - HOSPITAL COURSE
Problem/Plan - 1:  ·  Problem: Dizziness.  Plan: -Pt reports unprovoked dizziness. No associated ear pain or tinnitus   -w/ nystagmus on exam as well as dysmetria. CT head neg, MRI negative.   - s/p ILR   -ep FOLLOWING    Problem/Plan - 2:  ·  Problem: Fall, initial encounter.  Plan: -likely 2/2 to dysequilibrium, no associated syncope. Pt denies LOC  -workup as above, continue meclizine  -PT evaluation-home PT-patient declines, walks with a cane,  lives with her   -s/p lac repair.   Orthostatic s/p IVF now NEG     Problem/Plan - 3:  ·  Problem: Type 2 diabetes mellitus.  Plan: -uncontrolled  -holding oral hypoglycemics   -c/w ISS while in hospital       Problem/Plan - 4:·  Problem: Hypothyroidism.  Plan: -c/w levothyroxine Problem/Plan - 5:  ·  Problem: Essential hypertension.  Plan: -c/w losartan.     Stable for DC on 10/4/20

## 2020-10-04 NOTE — PROVIDER CONTACT NOTE (OTHER) - ASSESSMENT
Site is tender to touch and redness noted around edges of stitches.
patient asymptomatic, resting in bed no complaints. Zoll and atropine at bedside
Patient a&ox4. pt bradycardic to 44 overnight when asleep. zoll abd atropine at bedside. pt heart rate back up to 60
Patient asymptomatic, denies dizziness, resting comfortably in bed
Pt makes no complaints. No s/s of distress noted. Pt sleeping at time of event

## 2020-10-04 NOTE — DISCHARGE NOTE NURSING/CASE MANAGEMENT/SOCIAL WORK - PATIENT PORTAL LINK FT
You can access the FollowMyHealth Patient Portal offered by [facility] by registering at the following website: [email or web address]. By joining [facility]’s FollowMyHealth portal, you will also be able to view your health information using other applications (apps) compatible with our system.

## 2020-10-04 NOTE — DISCHARGE NOTE PROVIDER - CARE PROVIDER_API CALL
Corrina Boston  INTERNAL MEDICINE  [address] [place] [address]  Phone: [phone]  Fax: [phone]  Follow Up Time: 1 week

## 2020-10-04 NOTE — PROGRESS NOTE ADULT - PROVIDER SPECIALTY LIST ADULT
Cardiology
Hospitalist
Neurology
Cardiology

## 2020-10-04 NOTE — PROVIDER CONTACT NOTE (OTHER) - ACTION/TREATMENT ORDERED:
Continue to monitor site, PA to come and assess patient.
reschedule and give 100mg losartan now
Atropine and Zoll at the bedside. Notify provider if patient justen in 40s for more than 5 minutes
ACP made aware. will continue to monitor
Awaiting Provider orders. Will continue to monitor

## 2020-10-04 NOTE — DISCHARGE NOTE PROVIDER - NSDCMRMEDTOKEN_GEN_ALL_CORE_FT
acetaminophen 325 mg oral tablet: 2 tab(s) orally every 6 hours, As needed, Mild Pain (1 - 3), Moderate Pain (4 - 6)  aspirin 81 mg oral delayed release tablet: 1 tab(s) orally once a day  B-12 1000 mcg oral tablet: 1 tab(s) orally once a day  bacitracin 500 units/g topical ointment: 1 application topically once a day  levothyroxine 75 mcg (0.075 mg) oral tablet: 1 tab(s) orally once a day  losartan 100 mg oral tablet: 1 tab(s) orally once a day  meclizine 12.5 mg oral tablet: 1 tab(s) orally 2 times a day, As needed, Dizziness  mirtazapine 15 mg oral tablet: 1 tab(s) orally once a day (at bedtime)  simvastatin 10 mg oral tablet: 1 tab(s) orally once a day (at bedtime)  Vitamin D2 50,000 intl units (1.25 mg) oral capsule: 1 cap(s) orally once a week

## 2020-10-09 ENCOUNTER — APPOINTMENT (OUTPATIENT)
Dept: GYNECOLOGIC ONCOLOGY | Facility: CLINIC | Age: 75
End: 2020-10-09
Payer: MEDICARE

## 2020-10-09 PROCEDURE — 99214 OFFICE O/P EST MOD 30 MIN: CPT

## 2020-10-09 NOTE — PHYSICAL EXAM
[Abnormal] : Examination for hernias: Abnormal [Abdomen Hernia Ventral] : A(n) [] : incarcerated [Absent] : Adnexa(ae): Absent [Normal] : Recto-Vaginal Exam: Normal [de-identified] : lower extremity edema noted [de-identified] : started crying when I asked how she was doing, admits to depression  [de-identified] : SSE and BME: intact vaginal cuff. no pelvic masses [Fully active, able to carry on all pre-disease performance without restriction] : Status 0 - Fully active, able to carry on all pre-disease performance without restriction

## 2020-10-09 NOTE — HISTORY OF PRESENT ILLNESS
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 9/2015\par \par Previous Therapy\par 1) Endometrial Biopsy 8/2014\par        a) Benign endometrial polyp\par 2) Exploratory laparotomy, total abdominal hysterectomy, bilateral salpingo-oophorectomy, omentectomy, pelvic and para aortic lymphadenectomy 9/9/2015\par      a) Borderline endometrioid adenofibroma with foci of microinvasion of left adnexal mass, 12.4cm, unremarkable fallopian tube, LN 0/9\par     b) [place] [facility] [place] of endometrioid adenocarcinoma in background of borderline L ovarian tumor\par 3) Vaginal Biopsy 4/2016\par     a)Granulation tissue\par 4) PET/CT 2/3/17  FAMILIA\par 5) Colonoscopy normal 1/2017\par 6)CEA= 4.9 on 8/14/2017, has been persistently elevated even while FAMILIA most recent 3.7\par 7) Hospitalized at [facility] 2/2019 with SBO and incarcerated hernia\par    a) Diagnostic laparoscopy with Dr. Ag and Dr. Vines\par   b) Patient FAMILIA, with incarcerated ventral hernia with mesh placement 2/19/19\par 8) CEA= 4.9 on 9/16/2019 \par \par Patient here for follow-up. Concerned about L breast bruising after implantable loop recording device last week in the hospital. \par \par Health Maintenance\par Mammogram 2/2016- Due - will give referral today \par Colonoscopy 2017- repeat 5 years

## 2020-10-09 NOTE — REVIEW OF SYSTEMS
[Negative] : Musculoskeletal [Depression] : depression [FreeTextEntry3] : agrees to see SW [de-identified] : +CONSTIPATION [de-identified] : ambulates with cane

## 2020-10-09 NOTE — DISCUSSION/SUMMARY
[FreeTextEntry1] : Reassured patient about breast bruising, likely after procedure\par Patient to have CTAP due to persistently elevated CEA once insurance approves\par annual follow up in survivorship

## 2020-10-15 ENCOUNTER — APPOINTMENT (OUTPATIENT)
Dept: ELECTROPHYSIOLOGY | Facility: CLINIC | Age: 75
End: 2020-10-15
Payer: MEDICARE

## 2020-10-15 DIAGNOSIS — R55 SYNCOPE AND COLLAPSE: ICD-10-CM

## 2020-10-15 DIAGNOSIS — Z86.39 PERSONAL HISTORY OF OTHER ENDOCRINE, NUTRITIONAL AND METABOLIC DISEASE: ICD-10-CM

## 2020-10-15 PROCEDURE — 93285 PRGRMG DEV EVAL SCRMS IP: CPT

## 2020-10-15 RX ORDER — LOSARTAN POTASSIUM 100 MG/1
100 TABLET, FILM COATED ORAL DAILY
Refills: 0 | Status: ACTIVE | COMMUNITY

## 2020-10-15 RX ORDER — MIRTAZAPINE 15 MG/1
15 TABLET, FILM COATED ORAL
Refills: 0 | Status: ACTIVE | COMMUNITY

## 2020-10-15 RX ORDER — CHLORHEXIDINE GLUCONATE 4 %
1000 LIQUID (ML) TOPICAL
Refills: 0 | Status: ACTIVE | COMMUNITY

## 2020-10-15 RX ORDER — NITROFURANTOIN (MONOHYDRATE/MACROCRYSTALS) 25; 75 MG/1; MG/1
100 CAPSULE ORAL TWICE DAILY
Qty: 6 | Refills: 0 | Status: DISCONTINUED | COMMUNITY
Start: 2019-05-16 | End: 2020-10-15

## 2020-10-15 RX ORDER — ACETAMINOPHEN 325 MG/1
325 TABLET ORAL EVERY 6 HOURS
Refills: 0 | Status: ACTIVE | COMMUNITY

## 2020-10-15 RX ORDER — MECLIZINE HYDROCHLORIDE 12.5 MG/1
12.5 TABLET ORAL
Refills: 0 | Status: ACTIVE | COMMUNITY

## 2020-10-15 RX ORDER — SIMVASTATIN 10 MG/1
10 TABLET, FILM COATED ORAL
Refills: 0 | Status: ACTIVE | COMMUNITY

## 2020-10-15 RX ORDER — ASPIRIN 81 MG/1
81 TABLET, COATED ORAL
Refills: 0 | Status: ACTIVE | COMMUNITY

## 2020-10-15 RX ORDER — MAGNESIUM HYDROXIDE 400 MG/5ML
400 SUSPENSION ORAL
Qty: 1 | Refills: 2 | Status: DISCONTINUED | COMMUNITY
Start: 2020-09-23 | End: 2020-10-15

## 2020-10-15 RX ORDER — CHOLECALCIFEROL (VITAMIN D3) 1250 MCG
1.25 MG CAPSULE ORAL
Refills: 0 | Status: ACTIVE | COMMUNITY

## 2020-10-23 ENCOUNTER — RESULT REVIEW (OUTPATIENT)
Age: 75
End: 2020-10-23

## 2020-10-23 ENCOUNTER — OUTPATIENT (OUTPATIENT)
Dept: OUTPATIENT SERVICES | Facility: HOSPITAL | Age: 75
LOS: 1 days | End: 2020-10-23

## 2020-10-23 ENCOUNTER — APPOINTMENT (OUTPATIENT)
Dept: CT IMAGING | Facility: HOSPITAL | Age: 75
End: 2020-10-23
Payer: MEDICARE

## 2020-10-23 DIAGNOSIS — Z90.49 ACQUIRED ABSENCE OF OTHER SPECIFIED PARTS OF DIGESTIVE TRACT: Chronic | ICD-10-CM

## 2020-10-23 DIAGNOSIS — Z98.890 OTHER SPECIFIED POSTPROCEDURAL STATES: Chronic | ICD-10-CM

## 2020-10-23 DIAGNOSIS — Z90.710 ACQUIRED ABSENCE OF BOTH CERVIX AND UTERUS: Chronic | ICD-10-CM

## 2020-10-23 DIAGNOSIS — R97.0 ELEVATED CARCINOEMBRYONIC ANTIGEN [CEA]: ICD-10-CM

## 2020-10-23 DIAGNOSIS — C56.9 MALIGNANT NEOPLASM OF UNSPECIFIED OVARY: ICD-10-CM

## 2020-10-23 PROCEDURE — 74177 CT ABD & PELVIS W/CONTRAST: CPT | Mod: 26

## 2020-10-28 NOTE — ED ADULT NURSE NOTE - NSFALLRSKPASTHIST_ED_ALL_ED

## 2021-02-16 PROCEDURE — 99213 OFFICE O/P EST LOW 20 MIN: CPT | Mod: 95

## 2021-04-27 PROCEDURE — 99212 OFFICE O/P EST SF 10 MIN: CPT | Mod: 95

## 2021-06-23 NOTE — ED PROVIDER NOTE - GASTROINTESTINAL, MLM
82 year old female with a PMHx of CVA with right sided weakness, hypertension, hx urinary stones s/p stent, hx DVT on eliquis, hx of lymphoma presenting from [facility] with rapid heart rate. EKG in ED showed Aflutter and was given adenosine which did not break it. Amiodarone 150mg was then given which broke the rapid HR and showed residual Aflutter. WBC was 14.34, lactate was 3.1, trop 0.03.  CT a/p showed a large left-sided pleural effusion associated with compression atelectasis at the left lung base, new larger abdominal and pelvic ascites associated with mild omental thickening.  (11 Jun 2021 18:54). Patient found to have malignant pleural effusions. Palliative care on board. Made comfort care measures only. Patient able to be downgraded to medical floor and possible OP hospice.     #F/U   - Palliative   - Possible transition to outpatient hospice   - Comfort care measures only  - If respiratory distress develops, start morphine drip     #Acute hypoxic respiratory failure--> resolved   #Malignant Pleural Effusion   # Abdominal ascites likely secondary to abdominal malignancy  - CT ab/pelvis showed new large abdominal ascites, associated with mild omental thickening; abdominal lymphadenopathy; thickening of right lateral and posterior wall of the bladder (compared to CT in 2/2021)  - S/p paracentesis 6/14, 1.1 L straw-colored cloudy fluid removed; ANC 70 (178 x 39%) so no SBP; s/p 50ml Albumin 25% x 1  - SAAG 0.5, likely malignant ascites, fluid amylase 37, glucose 99, ; no organism on GS; Cytology is positive for adenocarcinoma, with unclear origin (GI, GYN or lung), pending cell block  - Pending flow cytometry (to rule out lymphoma)  - Left supraclavicular LN biopsy 5/13, metastatic adenocarcinoma, 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 Onc)  -  elevated   - Onc recs appreciated: follow up on identification of malignant cells; patient is leaning toward Hospice; will engage in [facility] convo today  - Palliative care on board  - COMFORT CARE MEASURES ONLY   -If pt decides to proceed with comfort care including cessation of pressor support, recommend starting morphine 2mg IV q1h PRN resp distress/dyspnea (may convert to roxanol 10mg po q3h PRN if pt planned for dc)  ativan 0.5mg IV/sl q6h PRN anxiety/restlessness   - Begin Morphine drip if in distress as per palliative   - Downgrade to medical floors   - possible transition to outpatient hospice, f/u palliative     # New onset alternating atrial flutter and atrial tachycardia - resolved  - Likely triggered by underlying malignancy/high stress state  - Complicated with hypotension, currently on Neosynephrine, continue to titrate down as tolerated; continue midodrine 10mg q8hrs      - titrating chirag down, starting levophed   - Clinically fluid overloaded, doubt more bolus will make a difference; cheetah fluid unresponsive  - CXR showed left lower lobe opacities, likely extension of malignant effusion  - Procal 0.27, BCx neg x 2, UCx neg, D/C abx  - Rate control with metoprolol 12.5mg q8hrs and digoxin 0.125mg q24hrs  - ECHO 60-65%  - Unable to give diuresis due to low BP, will reassess daily; HOLD IVF for now  - cardio on board  - f/u DIG level     # HTN  - Hold home BP meds now as patient is hypotensive  - Continue Midodrine 10mg q8hrs and wean off Neosynephrine, start Levophed     # DVT on Eliquis  - Hold Eliquis for now as patient is acute  - Continue heparin gtt for now, monitor PTT with a goal of 55 - 75  - Can switch back to Eliquis if no more procedure is planned ([place])  - Heprin decreased to rate 12 today, f/u evening PTT     # CVA with right sided residual weakness  - Currently not on ASA or Statin  - PT/OT    #DVT PPX: Heparin  #Diet: DASH  #GI PPX: Protonix   #activity as tolerated Called by primary RN for Pt HR in the 140's. Bedside ECG shows flutter. Will start Pt on Amiodarone gtt. Will continue to assess and monitor Called by primary Rn for Pt having decreased urine output. Bladder scan done by RN at bedside and showed residual greater than 350mL. Pt was straight cathed and no urine came out. Pt also c/o burning on urination. Will give ND 500mL bolus x1 and send UA and Ucx. PALLIATIVE MEDICINE INTERDISCIPLINARY TEAM NOTE    Provider:  [   ]Social Work   [   ]          [   ] Initial visit [   ] Follow up    Family or contact name / phone #   Met with: [   ] Patient  [   ] Family  [   ] Other:    Primary Language: [   ] English [   ] Other*:                      *Interpretation provided by:    SUPPORT DIAGNOSES            (Check all that apply)  [   ] Psychosocial spiritual assessment (PSSA)  [   ] EOL issues  [   ] Cultural / spiritual concerns  [   ] Pain / suffering  [   ] Dementia / AMS  [   ] Other:  [   ] AD issues  [   ] Grief / loss / sadness  [   ] Discharge issues  [   ] Distress / coping    PSYCHOSOCIAL ASSESSMENT OF PATIENT         (Check all that apply)  [   ] Initial Assessment            [   ] Reassessment          [   ] Not Applicable this visit    Pain/suffering acuity:  [   ] None to mild (0-3)           [   ] Moderate (4-6)        [   ] High (7-10)    Mental Status:  [   ] Alert/oriented (x3)          [   ] Confused/Altered(x2/x1)         [   ] Non-resp    Functional status:  [   ] Independent w ADLs      [   ] Needs Assistance             [   ] Bedbound/Full Care    Coping:  [   ] Coping well                     [   ] Coping w/difficulty            [   ] Poor coping    Support system:  [   ] Strong                              [   ] Adequate                        [   ] Inadequate    SPIRITUAL ASSESSMENT  Religious/Spiritual practice: ___Catholic________________________    Role of organized Episcopalian:  [  x ] Important                     [   ] Some (fam tradition, cultural)               [   ] None    Effects on medical care:  [   ] Yes, _____________________________________                         [  x ] None    Cultural/Episcopalian need:  [   ] Yes, _____________________________________                         [ x ] None    Refer to Pastoral Care:  [   ] Yes           [   ] No, not at this time    SERVICE PROVIDED  [   ]PSSA                                                                             [   ]Discharge support / facilitation  [   ]AD / goals of care counseling                                  [   ]EOL / death / bereavement counseling  [   ]Counseling / support                                                [   ] Family meeting  [ x ]Prayer / sacrament / ritual                                      [   ] Referral   [   ]Other                                                                       NOTE and Plan of Care (PoC): Pt,. was not having a good day. Pt's son was at bedside. Pt was very receptive to prayer. I plan to continue to provide care as needed. PALLIATIVE MEDICINE INTERDISCIPLINARY TEAM NOTE    Provider:  [ x  ]Social Work   [   ]          [ x  ] Initial visit [   ] Follow up    Family or contact name / phone # son  Met with: [ x  ] Patient  [ x  ] Family  [   ] Other:    Primary Language: [  x ] English [   ] Other*:                      *Interpretation provided by:    SUPPORT DIAGNOSES            (Check all that apply)  [   ] Psychosocial spiritual assessment (PSSA)  [   ] EOL issues  [   ] Cultural / spiritual concerns  [ x  ] Pain / suffering  [   ] Dementia / AMS  [   ] Other:  [   ] AD issues  [  x ] Grief / loss / sadness  [   ] Discharge issues  [   x] Distress / coping    PSYCHOSOCIAL ASSESSMENT OF PATIENT         (Check all that apply)  [ x  ] Initial Assessment            [   ] Reassessment          [   ] Not Applicable this visit    Pain/suffering acuity:  [   ] None to mild (0-3)           [  x ] Moderate (4-6)        [   ] High (7-10)    Mental Status:  [x   ] Alert/oriented (x3)          [   ] Confused/Altered(x2/x1)         [   ] Non-resp    Functional status:  [   ] Independent w ADLs      [   ] Needs Assistance             [   x] Bedbound/Full Care    Coping:  [   ] Coping well                     [ x  ] Coping w/difficulty            [   ] Poor coping    Support system:  [ x  ] Strong                              [   ] Adequate                        [   ] Inadequate    SPIRITUAL ASSESSMENT  Confucianism/Spiritual practice: ___________________________    Role of organized Latter day:  [   ] Important                     [   ] Some (fam tradition, cultural)               [   ] None    Effects on medical care:  [   ] Yes, _____________________________________                         [   ] None    Cultural/Nondenominational need:  [   ] Yes, _____________________________________                         [   ] None    Refer to Pastoral Care:  [   ] Yes           [x   ] No, not at this time    SERVICE PROVIDED  [   ]PSSA                                                                             [   ]Discharge support / facilitation  [x   ]AD / goals of care counseling                                  [   ]EOL / death / bereavement counseling  [x   ]Counseling / support                                                [   ] Family meeting  [   ]Prayer / sacrament / ritual                                      [   ] Referral   [   ]Other                                                                       NOTE and Plan of Care (PoC):    Patient is an 82 year old  female.  Patient was living at a SNF prior to admission.  Son was present during visit.  Son provided information for assessment, as patient expressed that she did not want to talk at this time.  Son expressed feeling overwhelmed and sad with regard to patient's condition. Support rendered with positive effect.   Reviewed with son services provided by Palliative Care and [facility] Direct[employer]. Called by primary RN for PT tachycardic with HR in the 140's. At bedside rhythm on monitor shows afib/flutter and tachy in the 140-150's. After receiving lopressor 5mg IVP x1 rhythm broke. will continue to assess and monitor. Called by primary RN for Pt having decreased urine output and Pt c/o pain/burning where the Vazquez catheter is. Vazquez flushed by primary RN and slight hematuria noted on returned of irrigated fluid. Bladder scan was done and showed residual urine reading from 300-700. Pt does have severe ascites and unsure if readings accurate. Pt received morphine 4mg x1 dose and endorsed improvement on pain. Will continue to assess and monitor. PALLIATIVE MEDICINE INTERDISCIPLINARY TEAM NOTE    Provider:  [   ]Social Work   [   ]          [   ] Initial visit [   ] Follow up    Family or contact name / phone #   Met with: [   ] Patient  [   ] Family  [   ] Other:    Primary Language: [   ] English [   ] Other*:                      *Interpretation provided by:    SUPPORT DIAGNOSES            (Check all that apply)  [   ] Psychosocial spiritual assessment (PSSA)  [   ] EOL issues  [   ] Cultural / spiritual concerns  [   ] Pain / suffering  [   ] Dementia / AMS  [   ] Other:  [   ] AD issues  [   ] Grief / loss / sadness  [   ] Discharge issues  [   ] Distress / coping    PSYCHOSOCIAL ASSESSMENT OF PATIENT         (Check all that apply)  [   ] Initial Assessment            [   ] Reassessment          [   ] Not Applicable this visit    Pain/suffering acuity:  [   ] None to mild (0-3)           [   ] Moderate (4-6)        [   ] High (7-10)    Mental Status:  [   ] Alert/oriented (x3)          [   ] Confused/Altered(x2/x1)         [   ] Non-resp    Functional status:  [   ] Independent w ADLs      [   ] Needs Assistance             [   ] Bedbound/Full Care    Coping:  [   ] Coping well                     [   ] Coping w/difficulty            [   ] Poor coping    Support system:  [   ] Strong                              [   ] Adequate                        [   ] Inadequate    SPIRITUAL ASSESSMENT  Judaism/Spiritual practice: ______Catholic_____________________    Role of organized Evangelical:  [   ] Important                     [x   ] Some (fam tradition, cultural)               [   ] None    Effects on medical care:  [   ] Yes, _____________________________________                         [ x ] None    Cultural/Zoroastrian need:  [   ] Yes, _____________________________________                         [   ] None    Refer to Pastoral Care:  [   ] Yes           [   ] No, not at this time    SERVICE PROVIDED  [   ]PSSA                                                                             [   ]Discharge support / facilitation  [   ]AD / goals of care counseling                                  [   ]EOL / death / bereavement counseling  [   ]Counseling / support                                                [   ] Family meeting  [ x  ]Prayer / sacrament / ritual                                      [   ] Referral   [   ]Other                                                                       NOTE and Plan of Care (PoC): I WILL FOLLOW UP AS NEEDED pt on heparin gtt, last ptt 73 but having hematuria, light red out put in cannister   will hold heparin gtt for 1 hour, and recheck urine and check CBC, ptt Abdomen soft, non-tender, no guarding.

## 2021-07-21 NOTE — ED ADULT TRIAGE NOTE - ACCOMPANIED BY
Onset:    3-4 days ago.     Symptoms:    Low back pain from overexertion.  Pain comes and goes.  Pain improves when sitting still or laying down.  Pain rated 4-5/10.  Pain located in the center of the lower back, nagging pain like muscle soreness.     Denies:    Chest pain, SOB, fever, chills, known injury, abdominal pain, severe pain, urinary or bowel concerns, NV, blood in urine or stool, unable to walk, dizziness, weakness, numbness, tingling.      Tried:   Tylenol and ibuprofen.       Per protocol, to be seen within 3 days.  He declines an appointment at this time.  He believes pain is due to overuse.  He is requesting some tramadol for pain.  Pharmacy verified.  Please advise.   Nurse notified.      Reason for Disposition  • [1] MODERATE back pain (e.g., interferes with normal activities) AND [2] present > 3 days    Protocols used: BACK PAIN-A-       Immediate family member

## 2021-08-02 NOTE — ED PROVIDER NOTE - CROS ED ROS STATEMENT
all other ROS negative except as per HPI Azithromycin Pregnancy And Lactation Text: This medication is considered safe during pregnancy and is also secreted in breast milk.

## 2021-08-10 NOTE — ED ADULT TRIAGE NOTE - ESI TRIAGE ACUITY LEVEL, MLM
Physical Therapy     Referred by: Bobby Sewell MD; Medical Diagnosis (from order):    Diagnosis Information      Diagnosis    V43.65 (ICD-9-CM) - Z96.652 (ICD-10-CM) - Status post total knee replacement, left                Progress Note    Visit:  13   Next referring provider appointment: 8/16/2021    Patient alert and oriented X3.    SUBJECTIVE                                                                                                             Had a rough night last night. States she fell asleep on her stomach, which she never does. Her leg was pulled up toward her stomach. She woke up screaming due to back pain and she thought she broke her leg. She used self massage to help relax her muscles, but feels \"battered.\" Was able to participate in pool exercises last Friday. Was sore Saturday as expected so she took it easy, but Sunday did a lot of activity again cleaning her house. Was also busy yesterday running errands. Feels okay today. Does see pain management tomorrow to go over her recent injection. Is foam rolling and notes this to help. Will also be getting a massage next week.  Functional Change: Overall is able to do normal daily activities. No limitations due to R knee.  Current functional limitations: Gets sore in the L knee with increased activity, sitting too long or pulling her L knee into more flexion (feels more stiff, less mobile)    OBJECTIVE                                                                                                                     Observed Gait:    Analysis: decreased geena/pace and wide base of support;    • Left: lateral trunk lean, decreased stance time and decreased lumbar pelvic rotation    • Right: lateral trunk lean    Range of Motion (ROM)   (degrees unless noted; active unless noted; norms in ( ); negative=lacking to 0, positive=beyond 0)   WFL: RLE, LLE  Knee:    - Flexion (150):        • Left: 133        • Right: 133    - Extension (0-10):        •  Left: 0        • Right: 0  Details / Comments: Left knee feels stiff compared to R despite symmetrical ROM     Strength  (out of 5 unless noted, standard test position unless noted, lbs tested with hand held dynamometer)   WFL: LLE, RLE, except as noted  Knee:    - Flexion:        • Left: 4+        Outcome Measures: Left Knee  KOOS, Jr. Raw Score: 4  KOOS Jr Calculated Score: 76.33  (interval score: 0=total knee disability to 100=perfect knee health) see flowsheet for additional documentation    Outcome Measures: Right Knee  KOOS, Jr. Raw Score: 0  KOOS Jr Calculated Score: 100  (interval score: 0=total knee disability to 100=perfect knee health) see flowsheet for additional documentation      TREATMENT                                                                                                                  Therapeutic Exercise:  Stepper x 15:00, (Level 4, most prior to session)     Fitter: 1 black cord  1. Sideways x 15 R/L    Leg Press:  1. Wide Stance x 2 sets of 10 (5 PL, both feet)  2. Narrow Stance x 10 (5 PL, both feet)    Sumo Squat x 3 sets of 10 (10# FW in hands)  Objective measurements, discussed plan of care    Manual Therapy:        Skilled input: verbal instruction/cues    Writer verbally educated and received verbal consent for hand placement, positioning of patient, and techniques to be performed today from patient for therapist position for techniques and hand placement and palpation for techniques as described above and how they are pertinent to the patient's plan of care.    Home Exercise Program: 6/7: Continue home health issued exercises    Access Code: [email or web address]  URL: [email or web address]  Date: 06/22/2021  Prepared by: Merry Bahena    Exercises  Hip Flexor Stretch with Chair - 1 x daily - 7 x weekly - 3 sets - 20-30 sec hold    Access Code: [email or web address]  URL: [email or web address]  Date: 06/24/2021  Prepared by: Merry Bahena    Exercises  GG Knee Progression  1 & 2 - 1 x daily - 7 x weekly - 3 sets - 10 reps    Access Code: [email or web address]  URL: [email or web address]  Date: 06/28/2021  Prepared by: Merry Bahena    Exercises  Hip Extension with Resistance Loop - 1 x daily - 7 x weekly - 3 sets - 10 reps  Standing Hip Flexion with Resistance Loop - 1 x daily - 7 x weekly - 3 sets - 10 reps  Hip Abduction with Resistance Loop - 1 x daily - 7 x weekly - 3 sets - 10 reps  Side Stepping with Resistance at Feet - 1 x daily - 7 x weekly - 3 sets - 10 reps    Access Code: [email or web address]  URL: [email or web address]  Date: 07/21/2021  Prepared by: Merry Bahena    Exercises  Seated Ankle Eversion with Resistance - 1 x daily - 7 x weekly - 3 sets - 10 reps  Standing Heel Raise with Band - 1 x daily - 7 x weekly - 3 sets - 10 reps  Lateral Band Walks with Resistance at Feet - 1 x daily - 7 x weekly - 3 sets - 10 reps  Ankle Inversion with Resistance - 1 x daily - 7 x weekly - 3 sets - 10 reps    Access Code: [email or web address]  URL: [email or web address]  Date: 08/03/2021  Prepared by: Merry Bahena    Exercises  Lateral Lunge - 1 x daily - 7 x weekly - 3 sets - 10 reps  Sumo Squat with Dumbbell - 1 x daily - 7 x weekly - 3 sets - 10 reps  Adductor Stretch on Chair - 1 x daily - 7 x weekly - 3 sets - 20-30 sec hold       ASSESSMENT                                                                                                             Kasandra has participated in 13 sessions of physical therapy thus far with 1 of those sessions being aquatic based PT. While she still notes her L knee to get sore with increased activity and feels like it is being \"pulled inward\" at times, she really has made excellent gains. Her strength and ROM are functional with the exception of mild L hamstring weakness. At this time I feel most of Kasandra's functional impairments are stemming from her lower back which she continues to have issues with. We have incorporated core work into her  rehab to help with this, but she does still have continued pain. She is currently working with pain management on this. We discussed her plan in detail today as I suspect she will be released following her next MD appt. Kasandra is quite active outside of therapy and she is encouraged to continue to participate in activity as tolerated. We did discuss the days she is more active, that she will likely be more sore as she is still going through the healing process and that she may need to modify at times. I do feel with continued participation in home exercises and working on flexibility, her L knee and lower back will make further gains. Kasandra is very hesitant to have a lower back surgery and I agree she should continue to focus on conservative treatment and weight loss at this time. Kasandra has one more aquatic session scheduled. Following this session, her chart will remain on hold until MD appt. If she is advised to continue with PT we can further work on her general strength and mobility through her current scheduled sessions until 9/3/21. If she is not referred back to PT, I do feel she is able to transition to self management and will do well and we can cancel all remaining appts.    To date the patient has made gains as expected as reported.  Patient Education:   Results of above outlined education: Verbalizes understanding and Demonstrates understanding      PLAN                                                                                                                           Updates to plan of care: continue current plan of care    Suggestions for next session as indicated: Progress per plan of care      GOALS                                                                                                                           decrease pain/stiffness to 0-3/10  improved involved knee ROM to extension 0° and flexion 120°  improve involved strength to 4+/5    The above improvements in impairments to assist  in obtaining goals listed below  Long Term Goals: to be met by end of plan of care  1. Patient will be independent with instructed task modification. Status: met   2. Patient will demonstrate ability to negotiate level and unlevel surfaces at variable velocities, including change of direction without increased pain or instability to return to age appropriate and community activities at prior level of function. Status: met   3. Patient will sleep at prior level of function per patient report. Status: met   4. Patient will ascend/descend stairs and complete transfers with minimal difficulty as needed for community access.  Status: met   5. Patient will be independent with progressed and modified home exercise program. Status: met       Therapy procedure time and total treatment time can be found documented on the Time Entry flowsheet   4

## 2021-09-10 NOTE — ED PROCEDURE NOTE - CPROC ED GASTRIC INTUB DETAIL1
The nasogastric tube (see size above) was inserted via the anatomic location./Placement was confirmed by aspiration of gastric secretions. Patent

## 2021-09-30 ENCOUNTER — APPOINTMENT (OUTPATIENT)
Dept: GYNECOLOGIC ONCOLOGY | Facility: CLINIC | Age: 76
End: 2021-09-30

## 2021-10-11 NOTE — ED ADULT NURSE NOTE - NS ED NURSE RECORD ANOTHER HT AND WT
Eat healthy foods you enjoy. Apixaban/Eliquis DOES NOT have a special diet. Limit your alcohol intake. No

## 2021-10-11 NOTE — ED ADULT NURSE NOTE - PRO INTERPRETER NEED 2
Immunization(s) given during visit:     Immunizations Administered     Name Date Dose Route    Influenza, Quadv, adjuvanted, 65 yrs +, IM, PF (Fluad) 10/11/2021 0.5 mL Intramuscular    Site: Deltoid- Left    Lot: [id number]    NDC: 50472-825-11           Patient instructed to remain in clinic for 20 minutes after injection and was advised to report any adverse reaction to me immediately. English

## 2021-10-15 ENCOUNTER — APPOINTMENT (OUTPATIENT)
Dept: ELECTROPHYSIOLOGY | Facility: CLINIC | Age: 76
End: 2021-10-15

## 2021-10-18 ENCOUNTER — LABORATORY RESULT (OUTPATIENT)
Age: 76
End: 2021-10-18

## 2021-10-18 ENCOUNTER — APPOINTMENT (OUTPATIENT)
Dept: GYNECOLOGIC ONCOLOGY | Facility: CLINIC | Age: 76
End: 2021-10-18
Payer: MEDICARE

## 2021-10-18 ENCOUNTER — NON-APPOINTMENT (OUTPATIENT)
Age: 76
End: 2021-10-18

## 2021-10-18 VITALS
SYSTOLIC BLOOD PRESSURE: 144 MMHG | HEIGHT: 62 IN | WEIGHT: 180 LBS | DIASTOLIC BLOOD PRESSURE: 74 MMHG | BODY MASS INDEX: 33.13 KG/M2 | HEART RATE: 53 BPM | OXYGEN SATURATION: 97 % | TEMPERATURE: 97.4 F | RESPIRATION RATE: 18 BRPM

## 2021-10-18 PROCEDURE — 36415 COLL VENOUS BLD VENIPUNCTURE: CPT

## 2021-10-18 PROCEDURE — 99213 OFFICE O/P EST LOW 20 MIN: CPT | Mod: 25

## 2021-10-18 NOTE — DISCUSSION/SUMMARY
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 Pt s/p hospitalization in 2019 for incarcerated hernia and SBO. Clinically, no evidence of disease. \par [] CEA and CA-125 drawn today. If CEA still elevated, consider CT A/P\par [] UA + UCx due to increasing urinary incontinence. Urology referral given. \par [] Referral for mammogram given. \par [] Annual follow up in survivorship

## 2021-10-18 NOTE — HISTORY OF PRESENT ILLNESS
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 9/2015\par \par Previous Therapy\par 1) Endometrial Biopsy 8/2014\par        a) Benign endometrial polyp\par 2) Exploratory laparotomy, total abdominal hysterectomy, bilateral salpingo-oophorectomy, omentectomy, pelvic and para aortic lymphadenectomy 9/9/2015\par      a) Borderline endometrioid adenofibroma with foci of microinvasion of left adnexal mass, 12.4cm, unremarkable fallopian tube, LN 0/9\par     b) [place] [facility] [place] with microscopic foci of endometrioid adenocarcinoma in background of borderline L ovarian tumor\par 3) Vaginal Biopsy 4/2016\par     a)Granulation tissue\par 4) PET/CT 2/3/17  FAMILIA\par 5) Colonoscopy normal 1/2017\par 6)CEA= 4.9 on 8/14/2017, has been persistently elevated even while FAMILIA most recent 3.7\par 7) Hospitalized at [facility] 2/2019 with SBO and incarcerated hernia\par    a) Diagnostic laparoscopy with Dr. Ag and Dr. Vines\par   b) Patient FAMILIA, with incarcerated ventral hernia with mesh placement 2/19/19\par 8) CEA= 4.9 on 9/16/2019 \par 9) CEA= 6.0 on 9/23/2020\par 10) CT Abd/pelvis 10/23/2020- FAMILIA \par \par Pt is here for 1 year survivorship follow up. Pt overall feeling well. Pt reporting urinary frequency and incontinence, worsening over the past month. Endorses intermittent constipation, relieved with PO hydration. Pt denies associated dysuria, hematuria. \par Denies vaginal bleeding, abdominal/pelvic pain, bloating, weight gain/loss, fever/chills, nausea/vomiting. \par \par Health Maintenance\par Mammogram 2/2016- Due, referral given. \par Colonoscopy 2017- repeat 5 years\par DXA- 2-3 yrs ago

## 2021-10-18 NOTE — REVIEW OF SYSTEMS
[Negative] : Musculoskeletal [Depression] : depression [FreeTextEntry3] : agrees to see SW [de-identified] : +CONSTIPATION [de-identified] : ambulates with cane

## 2021-10-18 NOTE — PHYSICAL EXAM
[Fully active, able to carry on all pre-disease performance without restriction] : Status 0 - Fully active, able to carry on all pre-disease performance without restriction [Normal] : Adnexa(ae): Normal [de-identified] : lower extremity edema noted [de-identified] : No loss of urine noted with valsalva.  [de-identified] : SSE and BME: intact vaginal cuff. no pelvic masses noted.

## 2021-10-20 LAB
APPEARANCE: ABNORMAL
BILIRUBIN URINE: NEGATIVE
BLOOD URINE: NEGATIVE
CANCER AG125 SERPL-ACNC: 26 U/ML
CEA SERPL-MCNC: 7 NG/ML
COLOR: YELLOW
GLUCOSE QUALITATIVE U: NEGATIVE
KETONES URINE: NEGATIVE
LEUKOCYTE ESTERASE URINE: ABNORMAL
NITRITE URINE: POSITIVE
PH URINE: 5
PROTEIN URINE: NORMAL
SPECIFIC GRAVITY URINE: 1.01
UROBILINOGEN URINE: NORMAL

## 2021-10-20 RX ORDER — NITROFURANTOIN (MONOHYDRATE/MACROCRYSTALS) 25; 75 MG/1; MG/1
100 CAPSULE ORAL
Qty: 14 | Refills: 0 | Status: ACTIVE | COMMUNITY
Start: 2020-12-08 | End: 1900-01-01

## 2021-10-22 DIAGNOSIS — R92.8 OTHER ABNORMAL AND INCONCLUSIVE FINDINGS ON DIAGNOSTIC IMAGING OF BREAST: ICD-10-CM

## 2021-10-26 NOTE — PROVIDER CONTACT NOTE (OTHER) - SITUATION
per tele tech patient had 2.06 sec pause on telemetry Show Aperture Variable?: Yes Consent: The patient's consent was obtained including but not limited to risks of crusting, scabbing, blistering, scarring, darker or lighter pigmentary change, recurrence, incomplete removal and infection. Render Note In Bullet Format When Appropriate: No Post-Care Instructions: I reviewed with the patient in detail post-care instructions. Patient is to wear sunprotection, and avoid picking at any of the treated lesions. Pt may apply Vaseline to crusted or scabbing areas. Duration Of Freeze Thaw-Cycle (Seconds): 0 Detail Level: Detailed

## 2021-11-11 ENCOUNTER — EMERGENCY (EMERGENCY)
Facility: HOSPITAL | Age: 76
LOS: 1 days | Discharge: HOME | End: 2021-11-11
Attending: EMERGENCY MEDICINE | Admitting: EMERGENCY MEDICINE
Payer: MEDICARE

## 2021-11-11 VITALS
HEART RATE: 54 BPM | HEIGHT: 63 IN | SYSTOLIC BLOOD PRESSURE: 158 MMHG | OXYGEN SATURATION: 100 % | TEMPERATURE: 98 F | DIASTOLIC BLOOD PRESSURE: 56 MMHG | RESPIRATION RATE: 18 BRPM

## 2021-11-11 VITALS
HEART RATE: 54 BPM | TEMPERATURE: 98 F | DIASTOLIC BLOOD PRESSURE: 62 MMHG | OXYGEN SATURATION: 99 % | RESPIRATION RATE: 18 BRPM | SYSTOLIC BLOOD PRESSURE: 146 MMHG

## 2021-11-11 DIAGNOSIS — Z98.890 OTHER SPECIFIED POSTPROCEDURAL STATES: Chronic | ICD-10-CM

## 2021-11-11 DIAGNOSIS — Z90.49 ACQUIRED ABSENCE OF OTHER SPECIFIED PARTS OF DIGESTIVE TRACT: Chronic | ICD-10-CM

## 2021-11-11 DIAGNOSIS — Z90.710 ACQUIRED ABSENCE OF BOTH CERVIX AND UTERUS: Chronic | ICD-10-CM

## 2021-11-11 LAB
ALBUMIN SERPL ELPH-MCNC: 4.8 G/DL — SIGNIFICANT CHANGE UP (ref 3.3–5)
ALP SERPL-CCNC: 131 U/L — HIGH (ref 40–120)
ALT FLD-CCNC: 19 U/L — SIGNIFICANT CHANGE UP (ref 4–33)
ANION GAP SERPL CALC-SCNC: 13 MMOL/L — SIGNIFICANT CHANGE UP (ref 7–14)
APTT BLD: 42.5 SEC — HIGH (ref 27–36.3)
AST SERPL-CCNC: 22 U/L — SIGNIFICANT CHANGE UP (ref 4–32)
BASOPHILS # BLD AUTO: 0.03 K/UL — SIGNIFICANT CHANGE UP (ref 0–0.2)
BASOPHILS NFR BLD AUTO: 0.7 % — SIGNIFICANT CHANGE UP (ref 0–2)
BILIRUB SERPL-MCNC: 0.4 MG/DL — SIGNIFICANT CHANGE UP (ref 0.2–1.2)
BLD GP AB SCN SERPL QL: NEGATIVE — SIGNIFICANT CHANGE UP
BUN SERPL-MCNC: 28 MG/DL — HIGH (ref 7–23)
CALCIUM SERPL-MCNC: 10.1 MG/DL — SIGNIFICANT CHANGE UP (ref 8.4–10.5)
CHLORIDE SERPL-SCNC: 104 MMOL/L — SIGNIFICANT CHANGE UP (ref 98–107)
CO2 SERPL-SCNC: 23 MMOL/L — SIGNIFICANT CHANGE UP (ref 22–31)
CREAT SERPL-MCNC: 1.01 MG/DL — SIGNIFICANT CHANGE UP (ref 0.5–1.3)
EOSINOPHIL # BLD AUTO: 0.12 K/UL — SIGNIFICANT CHANGE UP (ref 0–0.5)
EOSINOPHIL NFR BLD AUTO: 2.8 % — SIGNIFICANT CHANGE UP (ref 0–6)
GLUCOSE SERPL-MCNC: 134 MG/DL — HIGH (ref 70–99)
HCT VFR BLD CALC: 33.2 % — LOW (ref 34.5–45)
HGB BLD-MCNC: 11.3 G/DL — LOW (ref 11.5–15.5)
IANC: 2.65 K/UL — SIGNIFICANT CHANGE UP (ref 1.5–8.5)
IMM GRANULOCYTES NFR BLD AUTO: 0.5 % — SIGNIFICANT CHANGE UP (ref 0–1.5)
INR BLD: 1.74 RATIO — HIGH (ref 0.88–1.16)
LYMPHOCYTES # BLD AUTO: 1.16 K/UL — SIGNIFICANT CHANGE UP (ref 1–3.3)
LYMPHOCYTES # BLD AUTO: 26.9 % — SIGNIFICANT CHANGE UP (ref 13–44)
MCHC RBC-ENTMCNC: 31.7 PG — SIGNIFICANT CHANGE UP (ref 27–34)
MCHC RBC-ENTMCNC: 34 GM/DL — SIGNIFICANT CHANGE UP (ref 32–36)
MCV RBC AUTO: 93.3 FL — SIGNIFICANT CHANGE UP (ref 80–100)
MONOCYTES # BLD AUTO: 0.34 K/UL — SIGNIFICANT CHANGE UP (ref 0–0.9)
MONOCYTES NFR BLD AUTO: 7.9 % — SIGNIFICANT CHANGE UP (ref 2–14)
NEUTROPHILS # BLD AUTO: 2.65 K/UL — SIGNIFICANT CHANGE UP (ref 1.8–7.4)
NEUTROPHILS NFR BLD AUTO: 61.2 % — SIGNIFICANT CHANGE UP (ref 43–77)
NRBC # BLD: 0 /100 WBCS — SIGNIFICANT CHANGE UP
NRBC # FLD: 0 K/UL — SIGNIFICANT CHANGE UP
PLATELET # BLD AUTO: 210 K/UL — SIGNIFICANT CHANGE UP (ref 150–400)
POTASSIUM SERPL-MCNC: 4.8 MMOL/L — SIGNIFICANT CHANGE UP (ref 3.5–5.3)
POTASSIUM SERPL-SCNC: 4.8 MMOL/L — SIGNIFICANT CHANGE UP (ref 3.5–5.3)
PROT SERPL-MCNC: 7.7 G/DL — SIGNIFICANT CHANGE UP (ref 6–8.3)
PROTHROM AB SERPL-ACNC: 19.3 SEC — HIGH (ref 10.6–13.6)
RBC # BLD: 3.56 M/UL — LOW (ref 3.8–5.2)
RBC # FLD: 12.4 % — SIGNIFICANT CHANGE UP (ref 10.3–14.5)
RH IG SCN BLD-IMP: POSITIVE — SIGNIFICANT CHANGE UP
SARS-COV-2 RNA SPEC QL NAA+PROBE: SIGNIFICANT CHANGE UP
SODIUM SERPL-SCNC: 140 MMOL/L — SIGNIFICANT CHANGE UP (ref 135–145)
WBC # BLD: 4.32 K/UL — SIGNIFICANT CHANGE UP (ref 3.8–10.5)
WBC # FLD AUTO: 4.32 K/UL — SIGNIFICANT CHANGE UP (ref 3.8–10.5)

## 2021-11-11 PROCEDURE — 71045 X-RAY EXAM CHEST 1 VIEW: CPT | Mod: 26

## 2021-11-11 PROCEDURE — 70450 CT HEAD/BRAIN W/O DYE: CPT | Mod: 26,MD

## 2021-11-11 PROCEDURE — 93010 ELECTROCARDIOGRAM REPORT: CPT

## 2021-11-11 PROCEDURE — 72125 CT NECK SPINE W/O DYE: CPT | Mod: 26,MA

## 2021-11-11 PROCEDURE — 72170 X-RAY EXAM OF PELVIS: CPT | Mod: 26

## 2021-11-11 PROCEDURE — 99285 EMERGENCY DEPT VISIT HI MDM: CPT | Mod: 25

## 2021-11-11 RX ORDER — ACETAMINOPHEN 500 MG
650 TABLET ORAL ONCE
Refills: 0 | Status: COMPLETED | OUTPATIENT
Start: 2021-11-11 | End: 2021-11-11

## 2021-11-11 RX ORDER — METOCLOPRAMIDE HCL 10 MG
10 TABLET ORAL ONCE
Refills: 0 | Status: COMPLETED | OUTPATIENT
Start: 2021-11-11 | End: 2021-11-11

## 2021-11-11 RX ADMIN — Medication 650 MILLIGRAM(S): at 16:52

## 2021-11-11 RX ADMIN — Medication 10 MILLIGRAM(S): at 16:53

## 2021-11-11 NOTE — ED PROVIDER NOTE - ATTENDING CONTRIBUTION TO CARE
Pt was seen and evaluated by me. Pt is a 77 y/o female with PMHx HTN, DM type 2, hypothyroidism who presented to the ED for syncope today. Pt states she was going down the stairs when she was at the last step and passed out. Pt notes she hit the back of her head. Pt admits to headache. Denies any neck pain, back pain, fever, chills, nausea, vomiting, SOB, chest pain, or abd pain. Lungs CTA b/l. RRR. Abd soft, non-tender. No focal deficits. Noted to have swelling to left posterior scalp. No midline tenderness to neck or back.   Concern for syncope/head injury on Eliquis  Labs, EKG, CT head, Analgesia

## 2021-11-11 NOTE — ED ADULT NURSE NOTE - CHIEF COMPLAINT QUOTE
Patient had unwitnessed fall x 30 minutes ago while going down stairs. Patient fell on steps landing and fell backwards hitting head. +LOC as per daughter. Patient does not remember fall; daughter states she is forgetful at baseline. On aspirin daily. Large hematoma noted to back left side of head. Swelling noted to left side of neck.

## 2021-11-11 NOTE — ED PROVIDER NOTE - CLINICAL SUMMARY MEDICAL DECISION MAKING FREE TEXT BOX
77yo female pt PMH HTN, DM, hypothyroidism who presents s/p fall down stairs. + head trauma, +LOC, +dizzy/lightheaded, HA, hematoma. on eliquis. unequal pupils on exam. strength and sensations intact. will do CTH. Dr. Mann [phone] cardio 75yo female pt PMH HTN, DM, hypothyroidism who presents s/p fall down stairs. + head trauma, +LOC, +dizzy/lightheaded, HA, hematoma. on eliquis. unequal pupils on exam. strength and sensations intact. will do CTH and trauma screen. Dr. Mann [phone] cardio

## 2021-11-11 NOTE — ED PROVIDER NOTE - NSFOLLOWUPINSTRUCTIONS_ED_ALL_ED_FT
You were seen today after a fall. You were assessed with imaging and labs to rule out intracranial bleeds or fractures. Your imaging came back within normal limits. Please follow up with your primary care provider for further management. Please return to the ED if you start to experience other syncope, loss of consciousness, headaches, nausea or vomiting, or any other concerning symptoms.

## 2021-11-11 NOTE — ED PROVIDER NOTE - NS ED ROS FT
CONST: no fevers, no chills.   EYES: no pain. Pos blurry vision  ENT: no sore throat, no ear pain, no change in hearing. Pos neck pain  CV: no chest pain, no leg swelling  RESP: no shortness of breath, no cough  ABD: no abdominal pain, no nausea, no vomiting, no diarrhea  : no dysuria, no flank pain, no hematuria  MSK: no back pain, no extremity pain  NEURO: Pos HA, unequal pupils  HEME: on eliquis>easy bleeding  SKIN:  no rash

## 2021-11-11 NOTE — ED PROVIDER NOTE - PHYSICAL EXAMINATION
GENERAL: Awake, alert, NAD  HEENT: moist mucous membranes, EOMI. L sided parietal hematoma. Neck ROM intact but painful to movement. Pupils not equal. BL reactive.   LUNGS: CTAB, no wheezes or crackles   CARDIAC: RRR, no m/r/g  ABDOMEN: Soft, normal BS, non tender, non distended, no rebound, no guarding  BACK: No midline spinal tenderness, no CVA tenderness  EXT: No edema, no calf tenderness, 2+ DP pulses bilaterally, no deformities.  NEURO: Moving all extremities. motor and sensations intact.   SKIN: Warm and dry.   PSYCH: AAOx2. Not oriented to year

## 2021-11-11 NOTE — ED PROVIDER NOTE - PROGRESS NOTE DETAILS
CT head and neck w no hemorrhage or cervical fx. Pt w loop recorder. Cardiologist will check loop recorder to assess for any emergent concerns. Will update us. Rowland: patient signed out to me pending loop recorder results by cards, if unable to obtain, will place in obs for tele monitoring Dr. urbano found no abnormalities in the loop recording. will dc w instructions to sirisha

## 2021-11-11 NOTE — ED ADULT NURSE NOTE - OBJECTIVE STATEMENT
Break RN: Received pt to bed 14, A+Ox4, ambulatory at baseline. C/O left sided head pain post unwitnessed fall down stairs, pt states she "became dizzy", pt admits to head trauma and LOC. pt's pupils unequal reaction to light, equal strength and sensation bilaterally. Respirations even and unlabored, normal work of breathing, no accessory muscle use, speaking in full clear uninterrupted sentences. ABD is soft, non tender, non distended. Pt denies any chest pain, SOB,  N/V/D, fever, chills.  20G to RAC, Labs sent, will continue to monitor. Break RN: Received pt to bed 14, A+Ox4, ambulatory at baseline. C/O left sided head pain post unwitnessed fall down stairs, pt states she "became dizzy", pt admits to head trauma and LOC, swelling noted to the left side occipital area of head. pt's pupils unequal reaction to light, equal strength and sensation bilaterally. Respirations even and unlabored, normal work of breathing, no accessory muscle use, speaking in full clear uninterrupted sentences. ABD is soft, non tender, non distended. Pt denies any chest pain, SOB,  N/V/D, fever, chills.  20G to RAC, Labs sent, will continue to monitor.

## 2021-11-11 NOTE — ED ADULT NURSE REASSESSMENT NOTE - NS ED NURSE REASSESS COMMENT FT1
receiving pt from break coverage. pt oriented to person, place, confused on date (as per daughter that is baseline), situated c/o L sided headache. R pupil noted to be pinpointed, L pupil sluggish to react. pt denies acute vision changes. pt breathing even, non labored. pt denies chest pain, sob, n/v.

## 2021-11-11 NOTE — ED PROVIDER NOTE - OBJECTIVE STATEMENT
75 yo female pt on eliquis [facility] HTN, DM, hypothyroid who presents s/p fall @10AM while going down the stairs. Unwitnessed fall and LOC for less than 1 minute. Per family member pt was arousable when found. Patient w chronic onset of dizziness/lightheadedness and blurry vision. couple day hx of diarrhea. Denies tremors/seizure, chest pain, SOB, n/v, abd pain, urinary symptoms. Complaining of head trauma and a big hematoma and headache to the L parietal side, pain in the left side of neck and pain with neck ROM.

## 2021-11-11 NOTE — ED ADULT TRIAGE NOTE - CHIEF COMPLAINT QUOTE
Patient had unwitnessed fall x 30 minutes ago while going down stairs. Patient fell on steps landing and fell backwards hitting head. +LOC as per daughter. On aspirin daily. Large hematoma noted to back left side of head. Swelling noted to left side of neck. Patient had unwitnessed fall x 30 minutes ago while going down stairs. Patient fell on steps landing and fell backwards hitting head. +LOC as per daughter. Patient does not remember fall; daughter states she is forgetful at baseline. On aspirin daily. Large hematoma noted to back left side of head. Swelling noted to left side of neck.

## 2021-11-11 NOTE — ED ADULT NURSE NOTE - NSIMPLEMENTINTERV_GEN_ALL_ED
Implemented All Fall with Harm Risk Interventions:  [place] to call system. Call bell, personal items and telephone within reach. Instruct patient to call for assistance. Room bathroom lighting operational. Non-slip footwear when patient is off stretcher. Physically safe environment: no spills, clutter or unnecessary equipment. Stretcher in lowest position, wheels locked, appropriate side rails in place. Provide visual cue, wrist band, yellow gown, etc. Monitor gait and stability. Monitor for mental status changes and reorient to person, place, and time. Review medications for side effects contributing to fall risk. Reinforce activity limits and safety measures with patient and family. Provide visual clues: red socks.

## 2021-11-11 NOTE — ED PROVIDER NOTE - PATIENT PORTAL LINK FT
You can access the FollowMyHealth Patient Portal offered by [facility] by registering at the following website: [email or web address]. By joining [employer]s FollowMyHealth portal, you will also be able to view your health information using other applications (apps) compatible with our system.

## 2021-11-17 ENCOUNTER — NON-APPOINTMENT (OUTPATIENT)
Age: 76
End: 2021-11-17

## 2021-11-21 ENCOUNTER — EMERGENCY (EMERGENCY)
Facility: HOSPITAL | Age: 76
LOS: 1 days | Discharge: LEFT BEFORE DISCHARGE | End: 2021-11-21
Admitting: EMERGENCY MEDICINE
Payer: MEDICARE

## 2021-11-21 VITALS
OXYGEN SATURATION: 99 % | TEMPERATURE: 98 F | DIASTOLIC BLOOD PRESSURE: 75 MMHG | HEART RATE: 61 BPM | RESPIRATION RATE: 18 BRPM | HEIGHT: 63 IN | SYSTOLIC BLOOD PRESSURE: 112 MMHG

## 2021-11-21 DIAGNOSIS — Z90.49 ACQUIRED ABSENCE OF OTHER SPECIFIED PARTS OF DIGESTIVE TRACT: Chronic | ICD-10-CM

## 2021-11-21 DIAGNOSIS — Z90.710 ACQUIRED ABSENCE OF BOTH CERVIX AND UTERUS: Chronic | ICD-10-CM

## 2021-11-21 DIAGNOSIS — Z98.890 OTHER SPECIFIED POSTPROCEDURAL STATES: Chronic | ICD-10-CM

## 2021-11-21 PROCEDURE — L9991: CPT

## 2021-12-01 PROCEDURE — G9005: CPT

## 2021-12-21 ENCOUNTER — APPOINTMENT (OUTPATIENT)
Dept: CARE COORDINATION | Facility: HOME HEALTH | Age: 76
End: 2021-12-21

## 2022-02-10 NOTE — ED PROVIDER NOTE - CONDITION AT DISCHARGE:
Next appt 5/9/22  Last appt 12/20/2-acute  11/8/22-[facility]  Refill request for   Disp Refills Start End    levothyroxine 125 MCG tablet 90 tablet 3 11/23/2020     Sig - Route: Take 1 tablet by mouth daily. - Oral      TSH (mcUnits/mL)   Date Value   09/04/2021 4.677         Refilled per standing med protocol.  
Improved

## 2022-03-09 NOTE — PATIENT PROFILE ADULT - IS THERE A SUSPICION OF ABUSE/NEGLIGENCE?
Patient needs to come in for ECG.  Please call patient to schedule    (I just called patient and asked for call back re: cardiac clearance also, please see that message also if patient calls back) no

## 2022-03-16 NOTE — ED PROVIDER NOTE - CROS ED RESP ALL NEG
Where skin visible -- no rashes, no atypical discoloration, no jaundice present , normal turgor
negative...

## 2022-06-15 NOTE — ED ADULT NURSE NOTE - NSICDXPASTSURGICALHX_GEN_ALL_CORE_FT
show
PAST SURGICAL HISTORY:  H/O ventral hernia repair w/ mesh    S/P Appendectomy     S/P cholecystectomy     S/P hysterectomy

## 2022-07-21 NOTE — PATIENT PROFILE ADULT. - AS SC BRADEN FRICTION
Caller: Reed Clarke Jr.    Relationship to patient: Self    Best call back number: [phone]    Patient is needing: PATIENT TESTED POSITIVE WITH AN AT HOME COVID TEST. PATIENT HAS A HEADACHE, FEELS EXHAUSTED, IS REQUESTING MEDICATION BEFORE IT GETS WORSE.         
Called and spoke w pt, transferred up fornt to [facility] appt  
(3) no apparent problem

## 2022-10-31 ENCOUNTER — APPOINTMENT (OUTPATIENT)
Dept: GYNECOLOGIC ONCOLOGY | Facility: CLINIC | Age: 77
End: 2022-10-31

## 2022-11-08 ENCOUNTER — APPOINTMENT (OUTPATIENT)
Dept: GYNECOLOGIC ONCOLOGY | Facility: CLINIC | Age: 77
End: 2022-11-08

## 2022-11-08 ENCOUNTER — NON-APPOINTMENT (OUTPATIENT)
Age: 77
End: 2022-11-08

## 2022-11-08 VITALS
TEMPERATURE: 96.5 F | HEIGHT: 62 IN | WEIGHT: 178 LBS | SYSTOLIC BLOOD PRESSURE: 153 MMHG | OXYGEN SATURATION: 98 % | BODY MASS INDEX: 32.76 KG/M2 | HEART RATE: 57 BPM | DIASTOLIC BLOOD PRESSURE: 73 MMHG

## 2022-11-08 PROCEDURE — 99397 PER PM REEVAL EST PAT 65+ YR: CPT

## 2022-11-08 NOTE — REVIEW OF SYSTEMS
[Negative] : Musculoskeletal [Depression] : depression [FreeTextEntry3] : agrees to see SW [de-identified] : +CONSTIPATION [de-identified] : ambulates with cane

## 2022-11-08 NOTE — HISTORY OF PRESENT ILLNESS
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 9/2015\par \par Previous Therapy\par 1) Endometrial Biopsy 8/2014\par        a) Benign endometrial polyp\par 2) Exploratory laparotomy, total abdominal hysterectomy, bilateral salpingo-oophorectomy, omentectomy, pelvic and para aortic lymphadenectomy 9/9/2015\par      a) Borderline endometrioid adenofibroma with foci of microinvasion of left adnexal mass, 12.4cm, unremarkable fallopian tube, LN 0/9\par     b) [place] [facility] [place] [place] with microscopic foci of endometrioid adenocarcinoma in background of borderline L ovarian tumor\par 3) Vaginal Biopsy 4/2016\par     a)Granulation tissue\par 4) PET/CT 2/3/17  FAMILIA\par 5) Colonoscopy normal 1/2017\par 6)CEA= 4.9 on 8/14/2017, has been persistently elevated even while FAMILIA most recent 3.7\par 7) Hospitalized at [facility] 2/2019 with SBO and incarcerated hernia\par    a) Diagnostic laparoscopy with Dr. Ag and Dr. Vines\par   b) Patient FAMILIA, with incarcerated ventral hernia with mesh placement 2/19/19\par 8) CEA= 4.9 on 9/16/2019 \par 9) CEA= 6.0 on 9/23/2020\par 10) CT Abd/pelvis 10/23/2020- FAMILIA \par \par Pt is here for 1 year survivorship follow up. Pt overall feeling well. Endorses intermittent constipation, relieved with PO hydration. Pt denies associated dysuria, hematuria.  Denies vaginal bleeding, abdominal/pelvic pain, bloating, weight gain/loss, fever/chills, nausea/vomiting. \par \par Health Maintenance\par Mammogram 2/2016- Due, referral given. \par Colonoscopy 2017- repeat 5 years\par DXA- 2-3 yrs ago

## 2022-11-08 NOTE — PHYSICAL EXAM
[Normal] : Recto-Vaginal Exam: Normal [Chaperone Present] : A chaperone was present in the examining room during all aspects of the physical examination [Absent] : Adnexa(ae): Absent [de-identified] : lower extremity edema noted [Ambulatory and capable of all self care but unable to carry out any work activities] : Status 2- Ambulatory and capable of all self care but unable to carry out any work activities. Up and about more than 50% of waking hours

## 2022-11-08 NOTE — DISCUSSION/SUMMARY
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 Pt s/p hospitalization in 2019 for incarcerated hernia and SBO. Clinically, no evidence of disease. \par [] CEA and CA-125 drawn today. If CEA still elevated, consider CT A/P\par [] Annual follow up in survivorship

## 2022-11-09 LAB
CANCER AG125 SERPL-ACNC: 25 U/ML
CEA SERPL-MCNC: 6 NG/ML

## 2023-01-30 NOTE — ED PROVIDER NOTE - NSFOLLOWUPINSTRUCTIONS_ED_ALL_ED_FT
parents
We were unable to find an exact cause of your chest pain. Your cardiac enzymes were negative and your EKG did not show any significant findings that were concerning for a heart attack right now. It is possible that these symptoms are still do to your heart and you need to follow up with your cardiologist as soon as possible. If you have any new or concerning symptoms please come right back to the emergency room. These include increased SOB, fatigue, fever, or new chest pain. If you have any severe increase in pain, fever, uncontrollable nausea vomiting, or inability to tolerate eating and drinking you need to come right back to the emergency room.

## 2023-03-02 ENCOUNTER — APPOINTMENT (OUTPATIENT)
Dept: GYNECOLOGIC ONCOLOGY | Facility: CLINIC | Age: 78
End: 2023-03-02
Payer: MEDICARE

## 2023-03-02 VITALS
HEART RATE: 52 BPM | WEIGHT: 170 LBS | BODY MASS INDEX: 31.28 KG/M2 | SYSTOLIC BLOOD PRESSURE: 111 MMHG | RESPIRATION RATE: 17 BRPM | TEMPERATURE: 97.3 F | OXYGEN SATURATION: 98 % | DIASTOLIC BLOOD PRESSURE: 73 MMHG | HEIGHT: 62 IN

## 2023-03-02 DIAGNOSIS — R63.4 ABNORMAL WEIGHT LOSS: ICD-10-CM

## 2023-03-02 PROCEDURE — 99212 OFFICE O/P EST SF 10 MIN: CPT

## 2023-03-02 NOTE — PHYSICAL EXAM
[Chaperone Present] : A chaperone was present in the examining room during all aspects of the physical examination [Absent] : Adnexa(ae): Absent [Normal] : Recto-Vaginal Exam: Normal [Ambulatory and capable of all self care but unable to carry out any work activities] : Status 2- Ambulatory and capable of all self care but unable to carry out any work activities. Up and about more than 50% of waking hours

## 2023-03-02 NOTE — DISCUSSION/SUMMARY
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 \par \par Patient poor historian, progressing dementia.\par Clinically FAMILIA\par [] CEA and CA-125 drawn today. If elevated, consider CT A/P given weightl oss\par []Risk of recurrence very unlikely given early stage low grade ovarian cancer now 7 years ago\par []Will call son with results\par [] Annual follow up in survivorship

## 2023-03-02 NOTE — HISTORY OF PRESENT ILLNESS
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 9/2015\par \par Previous Therapy\par 1) Endometrial Biopsy 8/2014\par        a) Benign endometrial polyp\par 2) Exploratory laparotomy, total abdominal hysterectomy, bilateral salpingo-oophorectomy, omentectomy, pelvic and para aortic lymphadenectomy 9/9/2015\par      a) Borderline endometrioid adenofibroma with foci of microinvasion of left adnexal mass, 12.4cm, unremarkable fallopian tube, LN 0/9\par     b) [facility] - with microscopic foci of endometrioid adenocarcinoma in background of borderline L ovarian tumor\par 3) Vaginal Biopsy 4/2016\par     a)Granulation tissue\par 4) PET/CT 2/3/17  FAMILIA\par 5) Colonoscopy normal 1/2017\par 6)CEA= 4.9 on 8/14/2017, has been persistently elevated even while FAMILIA most recent 3.7\par 7) Hospitalized at [facility] 2/2019 with SBO and incarcerated hernia\par    a) Diagnostic laparoscopy with Dr. Ag and Dr. Vines\par   b) Patient FAMILIA, with incarcerated ventral hernia with mesh placement 2/19/19\par 8) CEA= 4.9 on 9/16/2019 \par 9) CEA= 6.0 on 9/23/2020\par 10) CT Abd/pelvis 10/23/2020- FAMILIA \par \par Here for early follow up from Primary Dr. Boston due to 11 pound weight loss over 8 months. Patient feeling well. Denies abdominal pain. Says she is just eating less, less appetite. Denies N/V, change in bowel habits. \par \par \par Health Maintenance\par Mammogram 2/2016- Due, referral given. \par Colonoscopy 2017- repeat 5 years\par DXA- 2-3 yrs ago

## 2023-03-02 NOTE — REVIEW OF SYSTEMS
[Negative] : Musculoskeletal [Depression] : depression [FreeTextEntry3] : agrees to see SW [de-identified] : +CONSTIPATION [de-identified] : ambulates with cane

## 2023-03-03 LAB
CANCER AG125 SERPL-ACNC: 27 U/ML
CEA SERPL-MCNC: 6.1 NG/ML

## 2023-11-14 ENCOUNTER — APPOINTMENT (OUTPATIENT)
Dept: GYNECOLOGIC ONCOLOGY | Facility: CLINIC | Age: 78
End: 2023-11-14

## 2023-11-28 ENCOUNTER — APPOINTMENT (OUTPATIENT)
Dept: GYNECOLOGIC ONCOLOGY | Facility: CLINIC | Age: 78
End: 2023-11-28
Payer: MEDICARE

## 2023-11-28 ENCOUNTER — NON-APPOINTMENT (OUTPATIENT)
Age: 78
End: 2023-11-28

## 2023-11-28 VITALS
DIASTOLIC BLOOD PRESSURE: 100 MMHG | BODY MASS INDEX: 32.2 KG/M2 | WEIGHT: 175 LBS | OXYGEN SATURATION: 95 % | TEMPERATURE: 97.6 F | HEIGHT: 62 IN | HEART RATE: 66 BPM | SYSTOLIC BLOOD PRESSURE: 158 MMHG

## 2023-11-28 DIAGNOSIS — C56.2 MALIGNANT NEOPLASM OF LEFT OVARY: ICD-10-CM

## 2023-11-28 DIAGNOSIS — R97.0 ELEVATED CARCINOEMBRYONIC ANTIGEN [CEA]: ICD-10-CM

## 2023-11-28 PROCEDURE — 99213 OFFICE O/P EST LOW 20 MIN: CPT

## 2023-11-30 LAB
CANCER AG125 SERPL-ACNC: 28 U/ML
CEA SERPL-MCNC: 6.5 NG/ML

## 2024-06-03 NOTE — ED PROVIDER NOTE - CROS ED CONS ALL NEG
hypertension - hydralazine as needed.   Bipolar disorder -  witnessed seizure like activity secondary to anoxic brain injury from drug overdose - MRI Brain pending. EEG normal     Significant Diagnostic Studies:   Labs / Micro:/Radiology  Recent Labs     05/30/24  0400 05/30/24  0025   WBC 12.0* 8.4   HGB 12.9* 12.4*   HCT 40.1* 39.2*   MCV 92.4 94.0    312         Latest Ref Rng & Units 5/31/2024     3:55 AM 5/30/2024     4:00 AM 5/30/2024    12:25 AM 5/16/2023     9:00 AM 9/22/2022     6:19 AM   Labs Renal   BUN 6 - 20 mg/dL 5  13  15  13  7    Cr 0.7 - 1.2 mg/dL 0.6  0.9  0.8  0.74  0.74    K 3.7 - 5.3 mmol/L 3.4  4.0  4.4  4.7  4.2    Na 135 - 144 mmol/L 141  139  136  142  137      Lab Results   Component Value Date    ALT 15 05/30/2024    AST 17 05/30/2024    ALKPHOS 70 05/30/2024    BILITOT 0.1 (L) 05/30/2024     Lab Results   Component Value Date    TSH 1.31 05/16/2023     Lab Results   Component Value Date    HEPAIGM NONREACTIVE 09/21/2022    HEPBIGM NONREACTIVE 09/21/2022    HEPCAB NONREACTIVE 09/21/2022     Lab Results   Component Value Date/Time    COLORU Yellow 05/30/2024 12:10 AM    NITRU NEGATIVE 05/30/2024 12:10 AM    GLUCOSEU NEGATIVE 05/30/2024 12:10 AM    GLUCOSEU NEGATIVE 11/28/2011 05:39 PM    KETUA 1+ 05/30/2024 12:10 AM    UROBILINOGEN Normal 05/30/2024 12:10 AM    BILIRUBINUR NEGATIVE 05/30/2024 12:10 AM     Lab Results   Component Value Date    LABA1C 5.6 05/16/2023     Lab Results   Component Value Date     05/16/2023     Lab Results   Component Value Date    INR 1.1 09/06/2019    INR 0.9 09/05/2019    INR 0.9 11/05/2013    PROTIME 11.9 09/06/2019    PROTIME 9.8 09/05/2019    PROTIME 10.3 11/05/2013       XR CHEST PORTABLE    Result Date: 5/31/2024  There is no evidence of acute chest disease.     XR CHEST PORTABLE    Result Date: 5/30/2024  No significant interval change.     CT CERVICAL SPINE WO CONTRAST    Result Date: 5/30/2024  No evidence of acute traumatic injury of the  negative...

## 2024-08-19 NOTE — PATIENT PROFILE ADULT - OVER THE PAST TWO WEEKS HAVE YOU FELT DOWN, DEPRESSED OR HOPELESS?
[FreeTextEntry1] : Leisa is a 61 yr old female, seen Dr. Bronson in past, seeing me first time.  Gastric sleeve in 2018, lost 70 pounds then  Type: 2 Severity: moderate Duration: over 20 years  Fingerstick in office: 131  A1c:  6.7% in 8/24.  Modifying factors- Current meds for glycemic control: Metformin 1000 mg BID Ozempic 1 mg weekly  SMBG once daily, fasting in AM. Reports BG is 140s  Diet: usually eats 3 meals daily, trying to eat better. Eating a lot of tomatoes. Weight: lost 17 pounds in the past year now stable. Exercise:  to restart 15 minutes [employer] exercise video daily  Associated symptoms- Last eye exam: 2/ 2024, mild, stable DR per patient Last foot exam: June 2023, started on cream. Ian be seeing neurology. Kidney disease: none Heart disease: none  Hypothyroidism Current regimen: LT4 50 mcg daily, takes appropriately.  Thyroid sonogram December 2022: RMP solid, hypoechoic, 0.9 cm nodule- stable RUP solid, hypoechoic 0.5 cm nodule- increased in size RUP solid, isoechoic 0.2 cm nodule- stable LMP solid, hypoechoic 0.9 cm nodule- decreased in size LMP solid, hypoechoic 0.9 cm nodule with lobulated margins, stable  Denies anterior neck pain, dysphagia, and voice changes
yes/sometimes

## 2024-09-27 ENCOUNTER — APPOINTMENT (OUTPATIENT)
Dept: CARE COORDINATION | Facility: HOME HEALTH | Age: 79
End: 2024-09-27

## 2024-11-12 ENCOUNTER — APPOINTMENT (OUTPATIENT)
Dept: CARE COORDINATION | Facility: HOME HEALTH | Age: 79
End: 2024-11-12

## 2024-11-27 NOTE — ED ADULT NURSE NOTE - PAIN: PRESENCE, MLM
would benefit from OmniPod  Referral has been sent  Patient is not comfortable carb counting and will need a set dose for bolusing with pump  Reinforced checking BS 4x a day  The patient was advised to check blood sugars 4 times a day before meals and at bedtime and send BS readings to our office in a week.  Discussed with patient A1c and blood sugar goals   Patient will need routine diabetes maintenance and prevention  Labs ordered        Dietary noncompliance  Discussed with patient the importance of eating consistent carbohydrate meals, avoiding high glycemic index food. Also, discussed with patient the risk and negative consequences of dietary noncompliance on blood glucose control, blood pressure and weight  Ongoing noncompliance    Hypothyroidism   On levothyroxine 100 mcg daily  Takes at night at least 4 hours after dinner  Will check TFTs    HLD  Encouraged low saturated fat diet and exercise  On statin therapy   Check lipids    Vit D deficiency  Levels at goal  On replacement   Check level    I personally reviewed external notes from PCP and other patient's care team providers, and personally interpreted labs associated with the above diagnosis. I also ordered labs to further assess and manage the above addressed medical conditions.     Return in about 3 months (around 2/27/2025).    The above issues were reviewed with the patient who understood and agreed with the plan.    Thank you for allowing us to participate in the care of this patient. Please do not hesitate to contact us with any additional questions.    Diagnosis Orders   1. Type 2 diabetes mellitus with hyperglycemia, with long-term current use of insulin ([facility])  Comprehensive Metabolic Panel    Lipid Panel    Microalbumin, Ur      2. Dietary noncompliance        3. Hypothyroidism, unspecified type  TSH    T4, Free      4. Mixed hyperlipidemia        5. Vitamin D deficiency  Vitamin D 25 Hydroxy            Rigo De La Torre, APRN - [facility]    Natalia 
denies pain/discomfort

## 2024-12-03 ENCOUNTER — APPOINTMENT (OUTPATIENT)
Dept: CARE COORDINATION | Facility: HOME HEALTH | Age: 79
End: 2024-12-03

## 2024-12-03 ENCOUNTER — APPOINTMENT (OUTPATIENT)
Dept: GYNECOLOGIC ONCOLOGY | Facility: CLINIC | Age: 79
End: 2024-12-03

## 2024-12-27 PROBLEM — L97.512 CHRONIC FOOT ULCER WITH FAT LAYER EXPOSED, RIGHT: Status: RESOLVED | Noted: 2017-09-08 | Resolved: 2024-12-27

## 2024-12-27 PROBLEM — N18.31 STAGE 3A CHRONIC KIDNEY DISEASE: Status: ACTIVE | Noted: 2024-12-27

## 2025-02-17 ENCOUNTER — APPOINTMENT (OUTPATIENT)
Dept: CT IMAGING | Facility: IMAGING CENTER | Age: 80
End: 2025-02-17

## 2025-02-28 ENCOUNTER — OUTPATIENT (OUTPATIENT)
Dept: OUTPATIENT SERVICES | Facility: HOSPITAL | Age: 80
LOS: 1 days | End: 2025-02-28

## 2025-02-28 VITALS
HEART RATE: 56 BPM | SYSTOLIC BLOOD PRESSURE: 117 MMHG | DIASTOLIC BLOOD PRESSURE: 74 MMHG | RESPIRATION RATE: 16 BRPM | HEIGHT: 62 IN | WEIGHT: 160.06 LBS | OXYGEN SATURATION: 98 % | TEMPERATURE: 98 F

## 2025-02-28 DIAGNOSIS — R63.4 ABNORMAL WEIGHT LOSS: ICD-10-CM

## 2025-02-28 DIAGNOSIS — I10 ESSENTIAL (PRIMARY) HYPERTENSION: ICD-10-CM

## 2025-02-28 DIAGNOSIS — Z79.01 LONG TERM (CURRENT) USE OF ANTICOAGULANTS: ICD-10-CM

## 2025-02-28 DIAGNOSIS — Z98.49 CATARACT EXTRACTION STATUS, UNSPECIFIED EYE: Chronic | ICD-10-CM

## 2025-02-28 DIAGNOSIS — Z98.890 OTHER SPECIFIED POSTPROCEDURAL STATES: Chronic | ICD-10-CM

## 2025-02-28 DIAGNOSIS — E03.9 HYPOTHYROIDISM, UNSPECIFIED: ICD-10-CM

## 2025-02-28 DIAGNOSIS — Z12.11 ENCOUNTER FOR SCREENING FOR MALIGNANT NEOPLASM OF COLON: ICD-10-CM

## 2025-02-28 DIAGNOSIS — Z90.710 ACQUIRED ABSENCE OF BOTH CERVIX AND UTERUS: Chronic | ICD-10-CM

## 2025-02-28 DIAGNOSIS — Z90.49 ACQUIRED ABSENCE OF OTHER SPECIFIED PARTS OF DIGESTIVE TRACT: Chronic | ICD-10-CM

## 2025-02-28 DIAGNOSIS — E11.9 TYPE 2 DIABETES MELLITUS WITHOUT COMPLICATIONS: ICD-10-CM

## 2025-02-28 RX ORDER — OLMESARTAN MEDOXOMIL 5 MG/1
1 TABLET, FILM COATED ORAL
Refills: 0 | DISCHARGE

## 2025-02-28 RX ORDER — AMLODIPINE BESYLATE 10 MG/1
1 TABLET ORAL
Refills: 0 | DISCHARGE

## 2025-02-28 RX ORDER — DAPAGLIFLOZIN 5 MG/1
1 TABLET, FILM COATED ORAL
Refills: 0 | DISCHARGE

## 2025-02-28 RX ORDER — APIXABAN 2.5 MG/1
1 TABLET, FILM COATED ORAL
Refills: 0 | DISCHARGE

## 2025-02-28 RX ORDER — LEVOTHYROXINE SODIUM 300 MCG
1 TABLET ORAL
Refills: 0 | DISCHARGE

## 2025-02-28 RX ORDER — RISPERIDONE 4 MG
0 TABLET ORAL
Refills: 0 | DISCHARGE

## 2025-02-28 NOTE — H&P PST ADULT - LAST CARDIAC ANGIOGRAM/IMAGING
pt unsure, cath report from 2019 in [place] [facility] - shows mild non-obstructive CAD - per report was done due to positive stress test

## 2025-02-28 NOTE — H&P PST ADULT - PROBLEM SELECTOR PLAN 6
Delirium Screening:    Pt eligible for ana rosa risk screen >=76? YES  Health care proxy paperwork given to patient? Yes  Impaired mobility (ie: uses cane, walker, wheelchair, or assist device)? YES  Known dementia diagnosis/previous delirium? YES  Impaired functional status (METS<4)? NO  Malnutrition BMI <20? NO    Surgeon notified of screening results via email.

## 2025-02-28 NOTE — H&P PST ADULT - PROBLEM SELECTOR PLAN 4
Pt was instructed by surgeon's office to hold Farxiga x 3 days preop (last dose 2/27/25). Reinforced plan.

## 2025-02-28 NOTE — H&P PST ADULT - CARDIOVASCULAR COMMENTS
pt on Eliquis - pt and  are unsure why ( denies pt having any hx of heart attack or stroke or blood clot) - hx arrythmia?? pt has loop recorder

## 2025-02-28 NOTE — H&P PST ADULT - HISTORY OF PRESENT ILLNESS
80 year old female presents today for presurgical evaluation for Endoscopy.     Pt A+O x 1-2 poor historian, accompanied by  Shaan Meza who is also poor historian.   They are unsure why procedure is being done.

## 2025-02-28 NOTE — ASU PATIENT PROFILE, ADULT - FALL HARM RISK - RISK INTERVENTIONS

## 2025-02-28 NOTE — ASU PATIENT PROFILE, ADULT - NSICDXPASTMEDICALHX_GEN_ALL_CORE_FT
PAST MEDICAL HISTORY:  Dementia     DM (diabetes mellitus)     Essential hypertension HTN (hypertension)    Hypothyroidism     Impaired vision     Obesity

## 2025-02-28 NOTE — H&P PST ADULT - PROBLEM SELECTOR PLAN 1
Pre-op instructions provide to pt and her  who  verbalized understanding.   Pepcid provided for GI prophylaxis.    Pt's  states he or his son provides consent for pt. Spoke with Dr. Landry's office who states consent will be obtained by  who will be present on day of procedure. Pre-op instructions provide to pt and her  who  verbalized understanding.   Pepcid provided for GI prophylaxis.  Recent labs from 1/15/25 in [place] [facility].     Pt's  states he or his son provides consent for pt. Spoke with Dr. Landry's office who states consent will be obtained by  who will be present on day of procedure.

## 2025-02-28 NOTE — H&P PST ADULT - NSICDXPASTMEDICALHX_GEN_ALL_CORE_FT
PAST MEDICAL HISTORY:  Dementia     Essential hypertension HTN (hypertension)    Hypothyroidism     Impaired vision     Obesity     Type 2 diabetes mellitus diet controlled     PAST MEDICAL HISTORY:  Dementia     DM (diabetes mellitus)     Essential hypertension HTN (hypertension)    Hypothyroidism     Impaired vision     Obesity

## 2025-02-28 NOTE — H&P PST ADULT - NSICDXPASTSURGICALHX_GEN_ALL_CORE_FT
PAST SURGICAL HISTORY:  H/O ventral hernia repair w/ mesh    History of loop recorder     S/P Appendectomy     S/P cataract surgery     S/P cholecystectomy     S/P hysterectomy

## 2025-02-28 NOTE — H&P PST ADULT - PROBLEM SELECTOR PLAN 2
Unsure why pt is on Eliquis. Pt was instructed by surgeon's office to hold Eliquis x 3 days (last dose 2/27/25). Called PCP's office and cardiologist's office to try to determine reason for anticoagulation - awaiting callbacks.  Requested copy of recent ekg.

## 2025-03-03 ENCOUNTER — OUTPATIENT (OUTPATIENT)
Dept: OUTPATIENT SERVICES | Facility: HOSPITAL | Age: 80
LOS: 1 days | Discharge: HOME | End: 2025-03-03
Payer: MEDICARE

## 2025-03-03 VITALS
SYSTOLIC BLOOD PRESSURE: 136 MMHG | DIASTOLIC BLOOD PRESSURE: 55 MMHG | OXYGEN SATURATION: 98 % | RESPIRATION RATE: 13 BRPM | HEART RATE: 58 BPM

## 2025-03-03 VITALS
HEIGHT: 62 IN | OXYGEN SATURATION: 100 % | DIASTOLIC BLOOD PRESSURE: 54 MMHG | WEIGHT: 160.06 LBS | TEMPERATURE: 97 F | SYSTOLIC BLOOD PRESSURE: 140 MMHG | HEART RATE: 54 BPM | RESPIRATION RATE: 20 BRPM

## 2025-03-03 DIAGNOSIS — R63.4 ABNORMAL WEIGHT LOSS: ICD-10-CM

## 2025-03-03 DIAGNOSIS — Z98.49 CATARACT EXTRACTION STATUS, UNSPECIFIED EYE: Chronic | ICD-10-CM

## 2025-03-03 DIAGNOSIS — Z90.49 ACQUIRED ABSENCE OF OTHER SPECIFIED PARTS OF DIGESTIVE TRACT: Chronic | ICD-10-CM

## 2025-03-03 DIAGNOSIS — Z90.710 ACQUIRED ABSENCE OF BOTH CERVIX AND UTERUS: Chronic | ICD-10-CM

## 2025-03-03 DIAGNOSIS — Z98.890 OTHER SPECIFIED POSTPROCEDURAL STATES: Chronic | ICD-10-CM

## 2025-03-03 LAB
GLUCOSE BLDC GLUCOMTR-MCNC: 131 MG/DL — HIGH (ref 70–99)
GLUCOSE BLDC GLUCOMTR-MCNC: 136 MG/DL — HIGH (ref 70–99)

## 2025-03-03 PROCEDURE — 88305 TISSUE EXAM BY PATHOLOGIST: CPT | Mod: 26

## 2025-03-03 NOTE — ASU PREOP CHECKLIST - IV STARTED
RADIATION ONCOLOGY WEEKLY ON TREATMENT VISIT   Encounter Date: May 15, 2024    Patient Name: Michelle Mills  MRN: [id number]  : 1970     Disease and Stage: Squamous cell carcinoma of the cervix, FIGO IB3   Treatment Site: Pelvis  Current Dose/Planned Total Dose: [3060] cGy / [4500] cGy followed by HDR brachytherapy    Concurrent Chemotherapy: Yes  Drug and Frequency: Weekly cisplatin    Gynecologic Oncologist: Katiuska Edward MD    Subjective: Ms. Mills presents to clinic today for her weekly on-treatment visit.  She needs to use the restroom and is thus in a hurry to leave clinic. She continues to have sharp and stabbing pain with defecation. She is also having dysuria.    Nursing ROS:   Nutrition Alteration  Diet Type: Patient's Preference  Skin  Skin Reaction: 1 - Faint erythema or dry desquamation     ENT and Mouth Exam  Mucositis - Current: 0 - None      Gastrointestinal  Nausea: 0 - None  Diarrhea: 0 - None  Genitourinary  Urinary Status: 0 - Normal     Pain Assessment  0-10 Pain Scale: 0     Objective:   Wt 87.5 kg (193 lb)   BMI 37.69 kg/m    /75   Pulse 82   Wt 87.1 kg (192 lb)   BMI 37.69 kg/m    Gen: Appears well, NAD  Abdomen: Nondistended  Skin: No erythema    Laboratory:  Lab Results   Component Value Date    WBC 5.9 2024    HGB 10.7 (L) 2024    HCT 31.9 (L) 2024    MCV 91 2024     (L) 2024     Lab Results   Component Value Date    CR 0.77 2024     Lab Results   Component Value Date     2024    POTASSIUM 3.6 2024    CHLORIDE 101 2024    CO2 26 2024     (H) 2024     Lab Results   Component Value Date    AST 20 2024    ALT 22 2024    ALKPHOS 75 2024    BILITOTAL 0.3 2024     Magnesium   Date Value Ref Range Status   2024 1.5 (L) 1.7 - 2.3 mg/dL Final     Treatment-related toxicities (CTCAE v5.0):  Anorexia: Grade 0: No toxicity  Fatigue: Grade 1: Fatigue relieved by  rest  Nausea: Grade 0: No toxicity  Pain: Grade 2: Moderate pain; limiting instrumental ADL  Diarrhea: Grade 1: Increase of <4 stools per day over baseline; mild increase in ostomy output compared to baseline  Urinary frequency: Grade 1: Present  Urinary tract pain: Grade 1: Mild pain  Urinary urgency: Grade 0: No toxicity  Dermatitis: Grade 0: No toxicity    ED visits/Hospitalizations:  None    Missed Treatments:  None    Mosaiq chart and setup information reviewed  IGRT images reviewed    Assessment:    Ms. Mills is a 53 year old female with a squamous cell carcinoma of the cervix, FIGO IB3, with tumor size greater than 4 cm, who is receiving definitive chemoradiation. She is tolerating radiation well.    Plan:   Continue radiation as planned  Discussed imodium, recommended the patient purchase loperamide  Reviewed skin hygiene, moisturizer  Discussed management of perianal pain which may be due to from either hemorrhoids or anal fissure.  Recommend Tucks wipes and barrier cream with either dimethicone or Cavilon (can be purchased online).  Patient was also given a squirt bottle to aid with urination burning.  Vaginal discharge is expected with early chemoRT, will ctm  6.   We reviewed plans for brachytherapy and need for anesthetics for Kevan sleeve placement. Patient is going to meet with anesthesia team to review concerns before deciding if she is willing to proceed. We reviewed that brachytherapy is necessary in order to achieve cure of her cancer.    Linda Espino MD PGY4    Physician Attestation  Ms. Mills was seen and examined by me. I agree with the findings and plan of care as documented in the note. Note above by Dr. Espino  was reviewed and edited by me and reflects our mutual findings and plan of care.  I personally reviewed the available treatment setup images and vital signs listed.     Dede Henry MD  Department of Radiation Oncology  [facility]           yes

## 2025-03-05 LAB — SURGICAL PATHOLOGY STUDY: SIGNIFICANT CHANGE UP

## (undated) DEVICE — LUBRICATING JELLY HR ONE SHOT 3G

## (undated) DEVICE — GOWN LG

## (undated) DEVICE — CONTAINER FORMALIN 80ML YELLOW

## (undated) DEVICE — CATH IV SAFE BC 22G X 1" (BLUE)

## (undated) DEVICE — BASIN EMESIS 10IN GRADUATED MAUVE

## (undated) DEVICE — UNDERPAD LINEN SAVER 17 X 24"

## (undated) DEVICE — PACK IV START WITH CHG

## (undated) DEVICE — BIOPSY FORCEP RADIAL JAW 4 STANDARD WITH NEEDLE

## (undated) DEVICE — BITE BLOCK ADULT 20 X 27MM (GREEN)

## (undated) DEVICE — TUBING MEDI-VAC W MAXIGRIP CONNECTORS 1/4"X6'

## (undated) DEVICE — BIOPSY FORCEP COLD DISP

## (undated) DEVICE — SALIVA EJECTOR (BLUE)

## (undated) DEVICE — DRSG 2X2

## (undated) DEVICE — CLAMP BX HOT RAD JAW 3

## (undated) DEVICE — TUBING IV SET GRAVITY 3Y 100" MACRO

## (undated) DEVICE — DRSG CURITY GAUZE SPONGE 4 X 4" 12-PLY NON-STERILE

## (undated) DEVICE — DRSG BANDAID 0.75X3"

## (undated) DEVICE — ELCTR ECG CONDUCTIVE ADHESIVE

## (undated) DEVICE — DENTURE CUP PINK